# Patient Record
Sex: FEMALE | Race: WHITE | Employment: UNEMPLOYED | URBAN - METROPOLITAN AREA
[De-identification: names, ages, dates, MRNs, and addresses within clinical notes are randomized per-mention and may not be internally consistent; named-entity substitution may affect disease eponyms.]

---

## 2017-03-23 ENCOUNTER — APPOINTMENT (OUTPATIENT)
Dept: GENERAL RADIOLOGY | Age: 62
End: 2017-03-23
Attending: EMERGENCY MEDICINE
Payer: COMMERCIAL

## 2017-03-23 ENCOUNTER — HOSPITAL ENCOUNTER (EMERGENCY)
Age: 62
Discharge: HOME | End: 2017-03-24
Attending: EMERGENCY MEDICINE
Payer: COMMERCIAL

## 2017-03-23 DIAGNOSIS — R55 NEAR SYNCOPE: Primary | ICD-10-CM

## 2017-03-23 DIAGNOSIS — R06.02 SOB (SHORTNESS OF BREATH): ICD-10-CM

## 2017-03-23 DIAGNOSIS — R42 DIZZINESS: ICD-10-CM

## 2017-03-23 LAB
ALBUMIN SERPL BCP-MCNC: 3.9 G/DL (ref 3.5–5)
ALBUMIN/GLOB SERPL: 1.1 {RATIO} (ref 1.1–2.2)
ALP SERPL-CCNC: 104 U/L (ref 45–117)
ALT SERPL-CCNC: 33 U/L (ref 12–78)
AMORPH CRY URNS QL MICRO: ABNORMAL
ANION GAP BLD CALC-SCNC: 10 MMOL/L (ref 5–15)
APPEARANCE UR: ABNORMAL
AST SERPL W P-5'-P-CCNC: 22 U/L (ref 15–37)
BACTERIA URNS QL MICRO: ABNORMAL /HPF
BASOPHILS # BLD AUTO: 0 K/UL (ref 0–0.1)
BASOPHILS # BLD: 0 % (ref 0–1)
BILIRUB SERPL-MCNC: 0.2 MG/DL (ref 0.2–1)
BILIRUB UR QL: NEGATIVE
BUN SERPL-MCNC: 18 MG/DL (ref 6–20)
BUN/CREAT SERPL: 33 (ref 12–20)
CALCIUM SERPL-MCNC: 9.9 MG/DL (ref 8.5–10.1)
CHLORIDE SERPL-SCNC: 96 MMOL/L (ref 97–108)
CK SERPL-CCNC: 69 U/L (ref 26–192)
CO2 SERPL-SCNC: 27 MMOL/L (ref 21–32)
COLOR UR: ABNORMAL
CREAT SERPL-MCNC: 0.54 MG/DL (ref 0.55–1.02)
D DIMER PPP FEU-MCNC: 0.21 MG/L FEU (ref 0–0.65)
EOSINOPHIL # BLD: 0.1 K/UL (ref 0–0.4)
EOSINOPHIL NFR BLD: 2 % (ref 0–7)
EPITH CASTS URNS QL MICRO: ABNORMAL /LPF
ERYTHROCYTE [DISTWIDTH] IN BLOOD BY AUTOMATED COUNT: 12.7 % (ref 11.5–14.5)
GLOBULIN SER CALC-MCNC: 3.5 G/DL (ref 2–4)
GLUCOSE SERPL-MCNC: 111 MG/DL (ref 65–100)
GLUCOSE UR STRIP.AUTO-MCNC: NEGATIVE MG/DL
HCT VFR BLD AUTO: 38 % (ref 35–47)
HGB BLD-MCNC: 13.1 G/DL (ref 11.5–16)
HGB UR QL STRIP: NEGATIVE
KETONES UR QL STRIP.AUTO: NEGATIVE MG/DL
LEUKOCYTE ESTERASE UR QL STRIP.AUTO: ABNORMAL
LYMPHOCYTES # BLD AUTO: 26 % (ref 12–49)
LYMPHOCYTES # BLD: 1.7 K/UL (ref 0.8–3.5)
MCH RBC QN AUTO: 30.7 PG (ref 26–34)
MCHC RBC AUTO-ENTMCNC: 34.5 G/DL (ref 30–36.5)
MCV RBC AUTO: 89 FL (ref 80–99)
MONOCYTES # BLD: 0.8 K/UL (ref 0–1)
MONOCYTES NFR BLD AUTO: 13 % (ref 5–13)
NEUTS SEG # BLD: 3.8 K/UL (ref 1.8–8)
NEUTS SEG NFR BLD AUTO: 59 % (ref 32–75)
NITRITE UR QL STRIP.AUTO: NEGATIVE
PH UR STRIP: 7.5 [PH] (ref 5–8)
PLATELET # BLD AUTO: 275 K/UL (ref 150–400)
POTASSIUM SERPL-SCNC: 3.4 MMOL/L (ref 3.5–5.1)
PROT SERPL-MCNC: 7.4 G/DL (ref 6.4–8.2)
PROT UR STRIP-MCNC: NEGATIVE MG/DL
RBC # BLD AUTO: 4.27 M/UL (ref 3.8–5.2)
RBC #/AREA URNS HPF: ABNORMAL /HPF (ref 0–5)
SODIUM SERPL-SCNC: 133 MMOL/L (ref 136–145)
SP GR UR REFRACTOMETRY: 1.01 (ref 1–1.03)
TROPONIN I SERPL-MCNC: <0.04 NG/ML
UA: UC IF INDICATED,UAUC: ABNORMAL
UROBILINOGEN UR QL STRIP.AUTO: 0.2 EU/DL (ref 0.2–1)
WBC # BLD AUTO: 6.3 K/UL (ref 3.6–11)
WBC URNS QL MICRO: ABNORMAL /HPF (ref 0–4)

## 2017-03-23 PROCEDURE — 80053 COMPREHEN METABOLIC PANEL: CPT | Performed by: EMERGENCY MEDICINE

## 2017-03-23 PROCEDURE — 84484 ASSAY OF TROPONIN QUANT: CPT | Performed by: EMERGENCY MEDICINE

## 2017-03-23 PROCEDURE — 99285 EMERGENCY DEPT VISIT HI MDM: CPT

## 2017-03-23 PROCEDURE — 87077 CULTURE AEROBIC IDENTIFY: CPT

## 2017-03-23 PROCEDURE — 82550 ASSAY OF CK (CPK): CPT | Performed by: EMERGENCY MEDICINE

## 2017-03-23 PROCEDURE — 96361 HYDRATE IV INFUSION ADD-ON: CPT

## 2017-03-23 PROCEDURE — 74011250636 HC RX REV CODE- 250/636: Performed by: EMERGENCY MEDICINE

## 2017-03-23 PROCEDURE — 81001 URINALYSIS AUTO W/SCOPE: CPT

## 2017-03-23 PROCEDURE — 87086 URINE CULTURE/COLONY COUNT: CPT

## 2017-03-23 PROCEDURE — 96360 HYDRATION IV INFUSION INIT: CPT

## 2017-03-23 PROCEDURE — 36415 COLL VENOUS BLD VENIPUNCTURE: CPT | Performed by: EMERGENCY MEDICINE

## 2017-03-23 PROCEDURE — 85379 FIBRIN DEGRADATION QUANT: CPT

## 2017-03-23 PROCEDURE — 93005 ELECTROCARDIOGRAM TRACING: CPT

## 2017-03-23 PROCEDURE — 85025 COMPLETE CBC W/AUTO DIFF WBC: CPT | Performed by: EMERGENCY MEDICINE

## 2017-03-23 PROCEDURE — 87186 SC STD MICRODIL/AGAR DIL: CPT

## 2017-03-23 RX ORDER — VALSARTAN AND HYDROCHLOROTHIAZIDE 320; 12.5 MG/1; MG/1
1 TABLET, FILM COATED ORAL DAILY
COMMUNITY
End: 2018-07-23 | Stop reason: ALTCHOICE

## 2017-03-23 RX ADMIN — SODIUM CHLORIDE 1000 ML: 900 INJECTION, SOLUTION INTRAVENOUS at 23:16

## 2017-03-24 ENCOUNTER — APPOINTMENT (OUTPATIENT)
Dept: GENERAL RADIOLOGY | Age: 62
End: 2017-03-24
Attending: EMERGENCY MEDICINE
Payer: COMMERCIAL

## 2017-03-24 VITALS
WEIGHT: 165 LBS | SYSTOLIC BLOOD PRESSURE: 147 MMHG | BODY MASS INDEX: 24.44 KG/M2 | HEIGHT: 69 IN | OXYGEN SATURATION: 96 % | RESPIRATION RATE: 13 BRPM | HEART RATE: 68 BPM | TEMPERATURE: 97.8 F | DIASTOLIC BLOOD PRESSURE: 71 MMHG

## 2017-03-24 LAB
ATRIAL RATE: 70 BPM
CALCULATED P AXIS, ECG09: 71 DEGREES
CALCULATED R AXIS, ECG10: 40 DEGREES
CALCULATED T AXIS, ECG11: 50 DEGREES
DIAGNOSIS, 93000: NORMAL
P-R INTERVAL, ECG05: 186 MS
Q-T INTERVAL, ECG07: 392 MS
QRS DURATION, ECG06: 106 MS
QTC CALCULATION (BEZET), ECG08: 423 MS
TROPONIN I SERPL-MCNC: <0.04 NG/ML
VENTRICULAR RATE, ECG03: 70 BPM

## 2017-03-24 PROCEDURE — 84484 ASSAY OF TROPONIN QUANT: CPT | Performed by: EMERGENCY MEDICINE

## 2017-03-24 PROCEDURE — 71010 XR CHEST PORT: CPT

## 2017-03-24 NOTE — ED NOTES
Pt and family updated on plan of care. Call bell in reach. No complaints at this time. VSS. NS fluids infusing in PIV. Troponin redraw sent to lab. Pt watching tv in position of comfort.

## 2017-03-24 NOTE — DISCHARGE INSTRUCTIONS
Dizziness: Care Instructions  Your Care Instructions  Dizziness is the feeling of unsteadiness or fuzziness in your head. It is different than having vertigo, which is a feeling that the room is spinning or that you are moving or falling. It is also different from lightheadedness, which is the feeling that you are about to faint. It can be hard to know what causes dizziness. Some people feel dizzy when they have migraine headaches. Sometimes bouts of flu can make you feel dizzy. Some medical conditions, such as heart problems or high blood pressure, can make you feel dizzy. Many medicines can cause dizziness, including medicines for high blood pressure, pain, or anxiety. If a medicine causes your symptoms, your doctor may recommend that you stop or change the medicine. If it is a problem with your heart, you may need medicine to help your heart work better. If there is no clear reason for your symptoms, your doctor may suggest watching and waiting for a while to see if the dizziness goes away on its own. Follow-up care is a key part of your treatment and safety. Be sure to make and go to all appointments, and call your doctor if you are having problems. It's also a good idea to know your test results and keep a list of the medicines you take. How can you care for yourself at home? · If your doctor recommends or prescribes medicine, take it exactly as directed. Call your doctor if you think you are having a problem with your medicine. · Do not drive while you feel dizzy. · Try to prevent falls. Steps you can take include:  ¨ Using nonskid mats, adding grab bars near the tub, and using night-lights. ¨ Clearing your home so that walkways are free of anything you might trip on. ¨ Letting family and friends know that you have been feeling dizzy. This will help them know how to help you. When should you call for help? Call 911 anytime you think you may need emergency care.  For example, call if:  · You passed out (lost consciousness). · You have dizziness along with symptoms of a heart attack. These may include:  ¨ Chest pain or pressure, or a strange feeling in the chest.  ¨ Sweating. ¨ Shortness of breath. ¨ Nausea or vomiting. ¨ Pain, pressure, or a strange feeling in the back, neck, jaw, or upper belly or in one or both shoulders or arms. ¨ Lightheadedness or sudden weakness. ¨ A fast or irregular heartbeat. · You have symptoms of a stroke. These may include:  ¨ Sudden numbness, tingling, weakness, or loss of movement in your face, arm, or leg, especially on only one side of your body. ¨ Sudden vision changes. ¨ Sudden trouble speaking. ¨ Sudden confusion or trouble understanding simple statements. ¨ Sudden problems with walking or balance. ¨ A sudden, severe headache that is different from past headaches. Call your doctor now or seek immediate medical care if:  · You feel dizzy and have a fever, headache, or ringing in your ears. · You have new or increased nausea and vomiting. · Your dizziness does not go away or comes back. Watch closely for changes in your health, and be sure to contact your doctor if:  · You do not get better as expected. Where can you learn more? Go to http://kelechi-barry.info/. Enter A781 in the search box to learn more about \"Dizziness: Care Instructions. \"  Current as of: May 27, 2016  Content Version: 11.1  © 3232-2469 [employer]. Care instructions adapted under license by [employer] (which disclaims liability or warranty for this information). If you have questions about a medical condition or this instruction, always ask your healthcare professional. [place] [address] any warranty or liability for your use of this information.        Vasovagal Syncope: Care Instructions  Your Care Instructions    Vasovagal syncope (say \"iaj-hhn-EYO-gul HJCI-xsb-[id number]\") is sudden dizziness or fainting that can be set off by things such as pain, stress, fear, or trauma. You may sweat or feel lightheaded, sick to your stomach, or tingly. The problem causes the heart rate to slow and the blood vessels to widen, or dilate, for a short time. When this happens, blood pools in the lower body, and less blood goes to the brain. You can usually get relief by lying down with your legs raised (elevated). This helps more blood to flow to your brain and may help relieve symptoms like feeling dizzy. Some doctors may recommend a technique that involves tensing your fists and arms. This type of fainting is often easy to predict. For example, it happens to some people when they see blood or have to get a shot. They may feel symptoms before they faint. An episode of vasovagal syncope usually responds well to self-care. Other treatment often isn't needed. But if the fainting keeps happening, your doctor may suggest further treatments. Follow-up care is a key part of your treatment and safety. Be sure to make and go to all appointments, and call your doctor if you are having problems. It's also a good idea to know your test results and keep a list of the medicines you take. How can you care for yourself at home? · Drink plenty of fluids to prevent dehydration. If you have kidney, heart, or liver disease and have to limit fluids, talk with your doctor before you increase your fluid intake. · Try to avoid things that you think may set off vasovagal syncope. · Talk to your doctor about any medicines you take. Some medicines may increase the chance of this condition occurring. · If you feel symptoms, lie down with your legs raised. Talk to your doctor about what to do if your symptoms come back. When should you call for help? Call 911 anytime you think you may need emergency care. For example, call if:  · You have symptoms of a heart problem. These may include:  ¨ Chest pain or pressure. ¨ Severe trouble breathing.   ¨ A fast or irregular heartbeat. Watch closely for changes in your health, and be sure to contact your doctor if:  · You have more episodes of fainting at home. · You do not get better as expected. Where can you learn more? Go to http://kelechi-barry.info/. Enter L754 in the search box to learn more about \"Vasovagal Syncope: Care Instructions. \"  Current as of: May 27, 2016  Content Version: 11.1  © [id number] [employer]. Care instructions adapted under license by [employer] (which disclaims liability or warranty for this information). If you have questions about a medical condition or this instruction, always ask your healthcare professional. [address] any warranty or liability for your use of this information.

## 2017-03-24 NOTE — ED NOTES
Bedside and Verbal shift change report given to SAÚL/Anabela Perla 1103 (oncoming nurse) by Martha Bernal (offgoing nurse). Report included the following information SBAR, ED Summary, MAR and Recent Results. Assuming care of pt. Pt resting on stretcher in position of comfort. Call bell in reach. A/Ox4. Pt has no complaints at this time. NS fluid bolus infusing. SRx1. Monitor x3.

## 2017-03-24 NOTE — ED NOTES
Dr. Irlanda Alvarado reviewed discharge instructions with the patient. The patient verbalized understanding. All questions and concerns were addressed. The patient declined a wheelchair and is discharged ambulatory in the care of family members with instructions and prescriptions in hand. Pt is alert and oriented x 4. Respirations are clear and unlabored.

## 2017-03-24 NOTE — ED PROVIDER NOTES
HPI Comments: 61 yo F w RA presents with near syncopal episode 2 hours ago. Prodrome of nausea diaphoresis and pallor, feelings of total body weakness, then lightheadedness and patient fell to the ground without LOC and was too weak to stand. Then developed nonexertional chest \"tightness\" and dyspnea. Prior to this, patient had tested positive for UTI, recent Polyuria. Exercised earlier today without limitation. Had negative cath in  (normal coronaries), neg stress 2 years ago. Denies prior blood clots. Denies fevers, rhythmic shaking, postictal.    The history is provided by the patient. Past Medical History:   Diagnosis Date    Abdominal pain     Chronic rheumatic arthritis (HCC)     Hypertension     Melanoma of thoracic region [facility])        Past Surgical History:   Procedure Laterality Date    HX GYN       X4    HX OTHER SURGICAL      lymph node removed from Right arm benign         Family History:   Problem Relation Age of Onset    Cancer Mother     Heart Disease Father        Social History     Social History    Marital status:      Spouse name: N/A    Number of children: N/A    Years of education: N/A     Occupational History    Not on file. Social History Main Topics    Smoking status: Former Smoker     Packs/day: 1.00     Years: 1.00     Types: Cigarettes, Cigars    Smokeless tobacco: Not on file      Comment: quit     Alcohol use No    Drug use: Not on file    Sexual activity: Not on file     Other Topics Concern    Not on file     Social History Narrative         ALLERGIES: Levaquin [levofloxacin]; Pcn [penicillins]; and Seafood [shellfish containing products]    Review of Systems   Constitutional: Positive for diaphoresis. Negative for fever. Eyes: Negative for visual disturbance. Respiratory: Positive for chest tightness and shortness of breath. Endocrine: Positive for polyuria. Genitourinary: Negative for dysuria.    Skin: Positive for pallor. Negative for wound. Neurological: Positive for weakness (generalized) and light-headedness. Negative for tremors and seizures. + near-syncope. Denies LOC   Psychiatric/Behavioral: Negative for suicidal ideas. All other systems reviewed and are negative. Patient Vitals for the past 12 hrs:   Temp Pulse Resp BP SpO2   03/24/17 0216 - 66 12 - 95 %   03/24/17 0147 - 66 11 - 97 %   03/24/17 0112 - 68 15 - 99 %   03/24/17 0100 - 68 11 147/71 96 %   03/24/17 0043 - 70 16 - 99 %   03/24/17 0033 - 70 13 155/64 97 %   03/24/17 0008 - 68 15 - 99 %   03/23/17 2330 - 71 16 152/70 97 %   03/23/17 2315 - 67 14 160/81 97 %   03/23/17 2217 97.8 °F (36.6 °C) 68 12 169/71 99 %              Physical Exam   Constitutional: She is oriented to person, place, and time. She appears well-developed and well-nourished. HENT:   Head: Normocephalic and atraumatic. Cardiovascular: Normal rate, regular rhythm, normal heart sounds and intact distal pulses. Exam reveals no gallop. No murmur heard. Pulmonary/Chest: Effort normal and breath sounds normal. No respiratory distress. She has no wheezes. She has no rales. Abdominal: Soft. She exhibits no distension. There is no tenderness. Musculoskeletal: She exhibits no edema. Neurological: She is alert and oriented to person, place, and time. Skin: Skin is warm and dry. Psychiatric: She has a normal mood and affect. Nursing note and vitals reviewed. MDM  Number of Diagnoses or Management Options  Diagnosis management comments: 63 yo F with prior clean cath, with near syncopal episode. Currently normal vitals, HD stable. Concern is for vasovagal syncope vs dehydration vs PE vs ACS.       Will eval with labs, cheest xray and OS VS       Amount and/or Complexity of Data Reviewed  Clinical lab tests: ordered and reviewed  Tests in the radiology section of CPT®: ordered and reviewed  Tests in the medicine section of CPT®: ordered and reviewed  Review and summarize past medical records: yes  Independent visualization of images, tracings, or specimens: yes    Patient Progress  Patient progress: stable    ED Course       Procedures     PROGRESS NOTE:  2:53 AM  Pt remained asymptomatic throughout rest of ED visit  Written by Reva Christopher ED Scribe, as dictated by Karo Richards MD.      LABORATORY TESTS:  Recent Results (from the past 12 hour(s))   EKG, 12 LEAD, INITIAL    Collection Time: 03/23/17  9:31 PM   Result Value Ref Range    Ventricular Rate 70 BPM    Atrial Rate 70 BPM    P-R Interval 186 ms    QRS Duration 106 ms    Q-T Interval 392 ms    QTC Calculation (Bezet) 423 ms    Calculated P Axis 71 degrees    Calculated R Axis 40 degrees    Calculated T Axis 50 degrees    Diagnosis       Normal sinus rhythm  Possible Left atrial enlargement  When compared with ECG of 30-JUN-2012 20:09,  No significant change was found     CBC WITH AUTOMATED DIFF    Collection Time: 03/23/17  9:42 PM   Result Value Ref Range    WBC 6.3 3.6 - 11.0 K/uL    RBC 4.27 3.80 - 5.20 M/uL    HGB 13.1 11.5 - 16.0 g/dL    HCT 38.0 35.0 - 47.0 %    MCV 89.0 80.0 - 99.0 FL    MCH 30.7 26.0 - 34.0 PG    MCHC 34.5 30.0 - 36.5 g/dL    RDW 12.7 11.5 - 14.5 %    PLATELET 426 351 - 087 K/uL    NEUTROPHILS 59 32 - 75 %    LYMPHOCYTES 26 12 - 49 %    MONOCYTES 13 5 - 13 %    EOSINOPHILS 2 0 - 7 %    BASOPHILS 0 0 - 1 %    ABS. NEUTROPHILS 3.8 1.8 - 8.0 K/UL    ABS. LYMPHOCYTES 1.7 0.8 - 3.5 K/UL    ABS. MONOCYTES 0.8 0.0 - 1.0 K/UL    ABS. EOSINOPHILS 0.1 0.0 - 0.4 K/UL    ABS.  BASOPHILS 0.0 0.0 - 0.1 K/UL   METABOLIC PANEL, COMPREHENSIVE    Collection Time: 03/23/17  9:42 PM   Result Value Ref Range    Sodium 133 (L) 136 - 145 mmol/L    Potassium 3.4 (L) 3.5 - 5.1 mmol/L    Chloride 96 (L) 97 - 108 mmol/L    CO2 27 21 - 32 mmol/L    Anion gap 10 5 - 15 mmol/L    Glucose 111 (H) 65 - 100 mg/dL    BUN 18 6 - 20 MG/DL    Creatinine 0.54 (L) 0.55 - 1.02 MG/DL    BUN/Creatinine ratio 33 (H) 12 - 20 GFR est AA >60 >60 ml/min/1.73m2    GFR est non-AA >60 >60 ml/min/1.73m2    Calcium 9.9 8.5 - 10.1 MG/DL    Bilirubin, total 0.2 0.2 - 1.0 MG/DL    ALT (SGPT) 33 12 - 78 U/L    AST (SGOT) 22 15 - 37 U/L    Alk. phosphatase 104 45 - 117 U/L    Protein, total 7.4 6.4 - 8.2 g/dL    Albumin 3.9 3.5 - 5.0 g/dL    Globulin 3.5 2.0 - 4.0 g/dL    A-G Ratio 1.1 1.1 - 2.2     CK W/ REFLX CKMB    Collection Time: 03/23/17  9:42 PM   Result Value Ref Range    CK 69 26 - 192 U/L   TROPONIN I    Collection Time: 03/23/17  9:42 PM   Result Value Ref Range    Troponin-I, Qt. <0.04 <0.05 ng/mL   D DIMER    Collection Time: 03/23/17 10:30 PM   Result Value Ref Range    D-dimer 0.21 0.00 - 0.65 mg/L FEU   URINALYSIS W/ REFLEX CULTURE    Collection Time: 03/23/17 10:53 PM   Result Value Ref Range    Color YELLOW/STRAW      Appearance TURBID (A) CLEAR      Specific gravity 1.012 1.003 - 1.030      pH (UA) 7.5 5.0 - 8.0      Protein NEGATIVE  NEG mg/dL    Glucose NEGATIVE  NEG mg/dL    Ketone NEGATIVE  NEG mg/dL    Bilirubin NEGATIVE  NEG      Blood NEGATIVE  NEG      Urobilinogen 0.2 0.2 - 1.0 EU/dL    Nitrites NEGATIVE  NEG      Leukocyte Esterase MODERATE (A) NEG      WBC 5-10 0 - 4 /hpf    RBC 0-5 0 - 5 /hpf    Epithelial cells FEW FEW /lpf    Bacteria 1+ (A) NEG /hpf    UA:UC IF INDICATED URINE CULTURE ORDERED (A) CNI      Amorphous Crystals 2+ (A) NEG   TROPONIN I    Collection Time: 03/24/17  1:09 AM   Result Value Ref Range    Troponin-I, Qt. <0.04 <0.05 ng/mL       IMAGING RESULTS:  CXR Results  (Last 48 hours)               03/24/17 0004  XR CHEST PORT Final result    Impression:  IMPRESSION: No Acute Disease. Narrative:  EXAM: Portable CXR.  2359 hours         INDICATION: Chest tightness, shortness of breath, dizziness, sweating and nausea   and feeling faint tonight. The lungs are clear. Heart is normal in size. There is no overt pulmonary edema.    There is no evident pneumothorax, apparent adenopathy or sizable pleural   effusion. MEDICATIONS GIVEN:  Medications   sodium chloride 0.9 % bolus infusion 1,000 mL (1,000 mL IntraVENous New Bag 3/23/17 8282)       IMPRESSION:  1. Near syncope    2. Dizziness    3. SOB (shortness of breath)        PLAN:  1. Discharge home  Follow-up Information     Follow up With Details 5[address] Frank Torres MD Today  [facility] [address] [facility] [address] [facility]  [phone]      Guero Morse MD Schedule an appointment as soon as possible for a visit in 3 days  [address]  [address]  [address] [address] [phone]      [facility] EMERGENCY DEPT  As needed, If symptoms worsen/recur [address]  [address] [facility]  [phone]      Return to ED if worse     DISCHARGE NOTE  2:15 AM  The patient has been re-evaluated and is ready for discharge. Reviewed available results with patient. Counseled pt on diagnosis and care plan. Pt has expressed understanding, and all questions have been answered. Pt agrees with plan and agrees to F/U as recommended, or return to the ED if their sxs worsen. Discharge instructions have been provided and explained to the pt, along with reasons to return to the ED. This note is prepared by Wilfred Benites, acting as Scribe for Steve Degroot MD.    Steve Degroot MD: The scribe's documentation has been prepared under my direction and personally reviewed by me in its entirety. I confirm that the note above accurately reflects all work, treatment, procedures, and medical decision making performed by me. I personally saw and examined the patient. I have reviewed and agree with the residents findings, including all diagnostic interpretations, and plans as written. I was present during the key portions of separately billed procedures.   Steve Degroot MD

## 2017-03-24 NOTE — ED NOTES
Pt arrives via EMS. Pt c/o chest tightness, shortness of breath, dizziness, sweating and nausea after going to restroom. Pt diagnosed with UTI at PCP ( Dr. Sy Adames) today. Pt has not taken medicine for UTI yet today. Pt has hx of HTN and denies cardiac hx. Monitor x 3. Call bell within reach and plan of care discussed.

## 2017-03-25 NOTE — PROGRESS NOTES
No abx at discharge. ED note states she was recently dx with UTI from outside facility but does not state when or what abx she was prescribed. Once sensitivity results needs to be informed and discuss what abx she was or is currently taking. Allergic to PCN and Levaquin.   Jannell Severe, PA-C

## 2017-03-26 LAB
BACTERIA SPEC CULT: ABNORMAL
BACTERIA SPEC CULT: ABNORMAL
CC UR VC: ABNORMAL
SERVICE CMNT-IMP: ABNORMAL

## 2018-07-23 ENCOUNTER — OFFICE VISIT (OUTPATIENT)
Dept: INTERNAL MEDICINE CLINIC | Age: 63
End: 2018-07-23

## 2018-07-23 VITALS
OXYGEN SATURATION: 99 % | HEART RATE: 83 BPM | HEIGHT: 69 IN | BODY MASS INDEX: 25.59 KG/M2 | SYSTOLIC BLOOD PRESSURE: 130 MMHG | WEIGHT: 172.8 LBS | DIASTOLIC BLOOD PRESSURE: 82 MMHG

## 2018-07-23 DIAGNOSIS — C43.59 MELANOMA OF THORACIC REGION (HCC): ICD-10-CM

## 2018-07-23 DIAGNOSIS — M06.9 RHEUMATOID ARTHRITIS INVOLVING MULTIPLE SITES, UNSPECIFIED RHEUMATOID FACTOR PRESENCE: ICD-10-CM

## 2018-07-23 DIAGNOSIS — I10 ESSENTIAL HYPERTENSION: ICD-10-CM

## 2018-07-23 DIAGNOSIS — Z00.00 ROUTINE PHYSICAL EXAMINATION: Primary | ICD-10-CM

## 2018-07-23 LAB
BACTERIA UA POCT, BACTPOCT: NORMAL
BILIRUB UR QL STRIP: NEGATIVE
CASTS UA POCT: 0
CLUE CELLS, CLUEPOCT: NEGATIVE
CRYSTALS UA POCT, CRYSPOCT: NEGATIVE
EPITHELIAL CELLS POCT: NEGATIVE
GLUCOSE UR-MCNC: NEGATIVE MG/DL
GRAN# POC: 3.6 K/UL (ref 2–7.8)
GRAN% POC: 65.1 % (ref 37–92)
HCT VFR BLD CALC: 39.6 % (ref 37–51)
HGB BLD-MCNC: 13.2 G/DL (ref 12–18)
KETONES P FAST UR STRIP-MCNC: NEGATIVE MG/DL
LY# POC: 1.6 K/UL (ref 0.6–4.1)
LY% POC: 29.1 % (ref 10–58.5)
MCH RBC QN: 30.2 PG (ref 26–32)
MCHC RBC-ENTMCNC: 33.3 G/DL (ref 30–36)
MCV RBC: 91 FL (ref 80–97)
MID #, POC: 0.3 K/UL (ref 0–1.8)
MID% POC: 5.8 % (ref 0.1–24)
MUCUS UA POCT, MUCPOCT: NORMAL
PH UR STRIP: 7 [PH] (ref 5–7)
PLATELET # BLD: 346 K/UL (ref 140–440)
PROT UR QL STRIP: NEGATIVE
RBC # BLD: 4.36 M/UL (ref 4.2–6.3)
RBC UA POCT, RBCPOCT: 0
SP GR UR STRIP: 1.01 (ref 1.01–1.02)
TRICH UA POCT, TRICHPOC: NEGATIVE
UA UROBILINOGEN AMB POC: NORMAL (ref 0.2–1)
URINALYSIS CLARITY POC: CLEAR
URINALYSIS COLOR POC: NORMAL
URINE BLOOD POC: NORMAL
URINE CULT COMMENT, POCT: NORMAL
URINE LEUKOCYTES POC: NEGATIVE
URINE NITRITES POC: NEGATIVE
WBC # BLD: 5.5 K/UL (ref 4.1–10.9)
WBC UA POCT, WBCPOCT: 0
YEAST UA POCT, YEASTPOC: NEGATIVE

## 2018-07-23 RX ORDER — AMLODIPINE BESYLATE 2.5 MG/1
2.5 TABLET ORAL DAILY
Qty: 90 TAB | Refills: 3 | Status: SHIPPED | OUTPATIENT
Start: 2018-07-23 | End: 2019-05-06 | Stop reason: SDUPTHER

## 2018-07-23 RX ORDER — VALSARTAN 320 MG/1
320 TABLET ORAL DAILY
Qty: 90 TAB | Refills: 0 | Status: SHIPPED | OUTPATIENT
Start: 2018-07-23 | End: 2018-09-25 | Stop reason: SDUPTHER

## 2018-07-23 RX ORDER — NABUMETONE 750 MG/1
750 TABLET, FILM COATED ORAL 2 TIMES DAILY
COMMUNITY
End: 2018-07-23 | Stop reason: ALTCHOICE

## 2018-07-23 RX ORDER — VALSARTAN 320 MG/1
TABLET ORAL DAILY
COMMUNITY
End: 2018-07-23 | Stop reason: SDUPTHER

## 2018-07-23 RX ORDER — AMLODIPINE BESYLATE 2.5 MG/1
TABLET ORAL DAILY
COMMUNITY
End: 2018-07-23 | Stop reason: SDUPTHER

## 2018-07-23 NOTE — PROGRESS NOTES
Miriam Gibbons is a 59 y.o. female presenting for Complete Physical  .     1. Have you been to the ER, urgent care clinic since your last visit? Hospitalized since your last visit? No    2. Have you seen or consulted any other health care providers outside of the [address] [place] [address] since your last visit? Include any pap smears or colon screening. Yes When: June 2018 Where: Dr Malachi Thomas for visit: RA follow up. Saw Dermatologist this morning for skin check. No flowsheet data found. No flowsheet data found. PHQ over the last two weeks 7/23/2018   Little interest or pleasure in doing things Not at all   Feeling down, depressed, irritable, or hopeless Not at all   Total Score PHQ 2 0       There are no discontinued medications.

## 2018-07-23 NOTE — PATIENT INSTRUCTIONS

## 2018-07-23 NOTE — MR AVS SNAPSHOT
[address] [facility] [address] [address] [phone] Patient: [place] MRN: [id number] DQS:[id number] Visit Information Date & Time Provider Department Dept. Phone Encounter #  
 7/23/2018  2:00 PM Anna Burkett, [address] [facility] [phone] [id number] Follow-up Instructions Return in about 1 year (around 7/23/2019). Follow-up and Disposition History Upcoming Health Maintenance Date Due DTaP/Tdap/Td series (1 - Tdap) 7/11/1977 PAP AKA CERVICAL CYTOLOGY 7/11/1977 FOBT Q 1 YEAR AGE 50-75 7/11/2006 Pneumococcal 19-64 Highest Risk (2 of 3 - PCV13) 1/1/2012 ZOSTER VACCINE AGE 60> 5/11/2016 BREAST CANCER SCRN MAMMOGRAM 5/5/2017 MEDICARE YEARLY EXAM 7/23/2018 Influenza Age 5 to Adult 8/1/2018 Allergies as of 7/23/2018  Review Complete On: 7/23/2018 By: Anna Burkett MD  
  
 Severity Noted Reaction Type Reactions Levaquin [Levofloxacin] Medium 06/18/2012    Rash Pcn [Penicillins]  06/18/2012    Rash Seafood [Shellfish Containing Products]  06/30/2012    Shortness of Breath Current Immunizations  Reviewed on 6/26/2012 Name Date Pneumococcal Polysaccharide (PPSV-23) 1/1/2011 Not reviewed this visit You Were Diagnosed With   
  
 Codes Comments Routine physical examination    -  Primary ICD-10-CM: Z00.00 ICD-9-CM: V70.0 Rheumatoid arthritis involving multiple sites, unspecified rheumatoid factor presence ([facility]     ICD-10-CM: M06.9 ICD-9-CM: 714.0 Essential hypertension     ICD-10-CM: I10 
ICD-9-CM: 401.9 Melanoma of thoracic region [facility])     ICD-10-CM: C43.59 
ICD-9-CM: 172.5 Vitals BP Pulse Height(growth percentile) Weight(growth percentile) SpO2 BMI  
 130/82 (BP 1 Location: Right arm, BP Patient Position: Sitting) 83 5' 9\" (1.753 m) 172 lb 12.8 oz (78.4 kg) 99% 25.52 kg/m2 OB Status Smoking Status Postmenopausal Former Smoker BMI and BSA Data Body Mass Index Body Surface Area 25.52 kg/m 2 1.95 m 2 Preferred Pharmacy Pharmacy Name Phone [facility], [place] - [address] [phone] Your Updated Medication List  
  
   
This list is accurate as of 7/23/18  2:43 PM.  Always use your most recent med list.  
  
  
  
  
 acetaminophen 500 mg tablet Commonly known as:  TYLENOL Take 1,000 mg by mouth every six (6) hours as needed. amLODIPine 2.5 mg tablet Commonly known as:  Love Breach Take 1 Tab by mouth daily. etanercept 50 mg/mL (0.98 mL) injection Commonly known as:  ENBREL 50 mg by SubCUTAneous route every seven (7) days. Indications: RHEUMATOID ARTHRITIS  
  
 hydroxychloroquine 200 mg tablet Commonly known as:  PLAQUENIL Take 200 mg by mouth two (2) times a day. Take one tablet twice daily  
  
 sulfaSALAzine 500 mg tablet Commonly known as:  AZULFIDINE Take 500 mg by mouth two (2) times a day. Take two tablets twice daily   Indications: RHEUMATOID ARTHRITIS  
  
 valsartan 320 mg tablet Commonly known as:  DIOVAN Take 1 Tab by mouth daily. Prescriptions Sent to Pharmacy Refills  
 valsartan (DIOVAN) 320 mg tablet 0 Sig: Take 1 Tab by mouth daily. Class: Normal  
 Pharmacy: [address] [place] [address] Ph #: [phone] Route: Oral  
 amLODIPine (NORVASC) 2.5 mg tablet 3 Sig: Take 1 Tab by mouth daily. Class: Normal  
 Pharmacy: [address] [place] [address] Ph #: [phone] Route: Oral  
  
We Performed the Following AMB POC COMPLETE CBC,AUTOMATED ENTER [id number] CPT(R)] AMB POC URINALYSIS DIP STICK AUTO W/ MICRO  [25940 CPT(R)] COLLECTION VENOUS BLOOD,VENIPUNCTURE [id number] CPT(R)] LIPID PANEL [28251 CPT(R)] METABOLIC PANEL, COMPREHENSIVE [95431 CPT(R)] TSH 3RD GENERATION [79311 CPT(R)] Follow-up Instructions Return in about 1 year (around 7/23/2019). Patient Instructions Learning About Cutting Calories How do calories affect your weight? Food gives your body energy. Energy from the food you eat is measured in calories. This energy keeps your heart beating, your brain active, and your muscles working. Your body needs a certain number of calories each day. After your body uses the calories it needs, it stores extra calories as fat. To lose weight safely, you have to eat fewer calories while eating in a healthy way. How many calories do you need each day? The more active you are, the more calories you need. When you are less active, you need fewer calories. How many calories you need each day also depends on several things, including your age and whether you are male or female. Here are some general guidelines for adults: 
· Less active women and older adults need 1,600 to 2,000 calories each day. · Active women and less active men need 2,000 to 2,400 calories each day. · Active men need 2,400 to 3,000 calories each day. How can you cut calories and eat healthy meals? Whole grains, vegetables and fruits, and dried beans are good lower-calorie foods. They give you lots of nutrients and fiber. And they fill you up. Sweets, energy drinks, and soda pop are high in calories. They give you few nutrients and no fiber. Try to limit soda pop, fruit juice, and energy drinks. Drink water instead. Some fats can be part of a healthy diet. But cutting back on fats from highly processed foods like fast foods and many snack foods is a good way to lower the calories in your diet. Also, use smaller amounts of fats like butter, margarine, salad dressing, and mayonnaise. Add fresh garlic, lemon, or herbs to your meals to add flavor without adding fat. Meats and dairy products can be a big source of hidden fats. Try to choose lean or low-fat versions of these products. Fat-free cookies, candies, chips, and frozen treats can still be high in sugar and calories. Some fat-free foods have more calories than regular ones. Eat fat-free treats in moderation, as you would other foods. If your favorite foods are high in fat, salt, sugar, or calories, limit how often you eat them. Eat smaller servings, or look for healthy substitutes. Fill up on fruits, vegetables, and whole grains. Eating at home · Use meat as a side dish instead of as the main part of your meal. 
· Try main dishes that use whole wheat pasta, brown rice, dried beans, or vegetables. · Find ways to cook with little or no fat, such as broiling, steaming, or grilling. · Use cooking spray instead of oil. If you use oil, use a monounsaturated oil, such as canola or olive oil. · Trim fat from meats before you cook them. · Drain off fat after you brown the meat or while you roast it. · Chill soups and stews after you cook them. Then skim the fat off the top after it hardens. Eating out · Order foods that are broiled or poached rather than fried or breaded. · Cut back on the amount of butter or margarine that you use on bread. · Order sauces, gravies, and salad dressings on the side, and use only a little. · When you order pasta, choose tomato sauce rather than cream sauce. · Ask for salsa with your baked potato instead of sour cream, butter, cheese, or johnson. · Order meals in a small size instead of upgrading to a large. · Share an entree, or take part of your food home to eat as another meal. 
· Share appetizers and desserts. Where can you learn more? Go to http://kelechi-barry.info/. Enter 99 [id number] in the search box to learn more about \"Learning About Cutting Calories. \" Current as of: May 12, 2017 Content Version: 11.7 © 4109-2979 Healthwise, Incorporated. Care instructions adapted under license by [employer] (which disclaims liability or warranty for this information). If you have questions about a medical condition or this instruction, always ask your healthcare professional. [address] any warranty or liability for your use of this information. Patient Instructions History Introducing [facility]! Dear [place]: 
Thank you for requesting a [employer] account. Our records indicate that you already have an active [employer] account. You can access your account anytime at [email or web address]. [email or web address]/[email or web address] Did you know that you can access your hospital and ER discharge instructions at any time in [employer]? You can also review all of your test results from your hospital stay or ER visit. Additional Information If you have questions, please visit the Frequently Asked Questions section of the [employer] website at [email or web address] Remember, [employer] is NOT to be used for urgent needs. For medical emergencies, dial 911. Now available from your iPhone and Android! Please provide this summary of care documentation to your next provider. Your primary care clinician is listed as GISSELLE Ferreira. If you have any questions after today's visit, please call [phone].

## 2018-07-23 NOTE — PROGRESS NOTES
Subjective: This note will not be viewable in [address] 59 y.o. female for annual Comprehensive personal healthcare examination. Mrs. Angelica Moore is a 67-year-old  female who presents to the office today for complete checkup. Her medical issues have included the following    The patient has hypertension for which he is on amlodipine and valsartan. She tolerates this well and denies any dizziness, lower extremity edema, cough. She has had no headaches, numbness, tingling or focal neurological problems. She is followed by Dr. Johnathon Smith at [facility] for rheumatoid arthritis. She remains on Enbrel, Azulfidine and Plaquenil. She is tolerating this regimen well and is had no evidence of any infection. Her disease has been fairly well controlled. The patient has had a history of melanoma ×2 removed. She is followed very closely by dermatology as a result. History of present illness: This patient has multiple medical problems. These include:   Patient Active Problem List   Diagnosis Code    Rheumatoid arthritis ([facility] M06.9    Abdominal pain R10.9    Elevated liver function tests R79.89    Sepsis(995.91) A41.9    Melanoma of thoracic region ([facility] C43.59    HTN (hypertension) I10    Adverse drug reaction T88. 7XXA          Past Medical History:   Diagnosis Date    Abdominal pain     Chronic rheumatic arthritis ([facility]     Hypertension     Melanoma of thoracic region [facility])      Past Surgical History:   Procedure Laterality Date    HX GYN       X4    HX OTHER SURGICAL      lymph node removed from Right arm benign     Allergies   Allergen Reactions    Levaquin [Levofloxacin] Rash    Pcn [Penicillins] Rash    Seafood [Shellfish Containing Products] Shortness of Breath     Current Outpatient Prescriptions   Medication Sig Dispense Refill    valsartan (DIOVAN) 320 mg tablet Take 1 Tab by mouth daily. 90 Tab 0    amLODIPine (NORVASC) 2.5 mg tablet Take 1 Tab by mouth daily.  90 Tab 3  etanercept (ENBREL) 50 mg/mL (0.98 mL) injection 50 mg by SubCUTAneous route every seven (7) days. Indications: RHEUMATOID ARTHRITIS      sulfaSALAzine (AZULFIDINE) 500 mg tablet Take 500 mg by mouth two (2) times a day. Take two tablets twice daily   Indications: RHEUMATOID ARTHRITIS      hydroxychloroquine (PLAQUINIL) 200 mg tablet Take 200 mg by mouth two (2) times a day. Take one tablet twice daily       acetaminophen (TYLENOL) 500 mg tablet Take 1,000 mg by mouth every six (6) hours as needed. Social History     Social History    Marital status:      Spouse name: N/A    Number of children: N/A    Years of education: N/A     Social History Main Topics    Smoking status: Former Smoker     Packs/day: 1.00     Years: 1.00     Types: Cigarettes, Cigars    Smokeless tobacco: Never Used      Comment: quit 1990    Alcohol use No    Drug use: None    Sexual activity: Not Asked     Other Topics Concern    None     Social History Narrative     Family History   Problem Relation Age of Onset    Cancer Mother     Heart Disease Father        Health Maintenance   Topic Date Due    DTaP/Tdap/Td series (1 - Tdap) 07/11/1977    PAP AKA CERVICAL CYTOLOGY  07/11/1977    FOBT Q 1 YEAR AGE 50-75  07/11/2006    Pneumococcal 19-64 Highest Risk (2 of 3 - PCV13) 01/01/2012    ZOSTER VACCINE AGE 60>  05/11/2016    BREAST CANCER SCRN MAMMOGRAM  05/05/2017    MEDICARE YEARLY EXAM  07/23/2018    Influenza Age 9 to Adult  08/01/2018    Hepatitis C Screening  Completed       Review of Systems  Constitutional:  She denies fever, weight loss, sweats or fatigue. HEENT:  No blurred or double vision, headache or dizziness. No difficulty with swallowing, taste, speech or smell. Respiratory:  No cough, wheezing or shortness of breath. No sputum production. Cardiac:  Denies chest pain, palpitations, unexplained indigestion, syncope, edema, PND or orthopnea.     GI:  No changes in bowel movements, no abdominal pain, no bloating, anorexia, nausea, vomiting or heartburn. :  No frequency or dysuria. Denies incontinence. Extremities:  No joint pain, stiffness or swelling. Skin:  No recent rashes or mole changes. Neurological:  No numbness, tingling, burning paresthesias or loss of motor strength. No syncope, dizziness, frequent headaches or memory loss. ROS otherwise negative      Objective:     Vitals:    07/23/18 1401   BP: 130/82   Pulse: 83   SpO2: 99%   Weight: 172 lb 12.8 oz (78.4 kg)   Height: 5' 9\" (1.753 m)   PainSc:   0 - No pain     Body mass index is 25.52 kg/(m^2). Physical Examination:   General Appearance:  Well-developed, well-nourished, no acute distress. Vision:  Deferred to ophthalmologist.       HEENT:    Ears:  The TMs and ear canals were clear. Eyes:  The pupillary responses were normal.  Extraocular muscle function intact. Lids and conjunctiva not injected. No conjunctiva redness or drainage. Pharynx:  Clear with teeth in good repair. No masses were noted. Neck:  Supple without thyromegaly or adenopathy. No JVD noted. No carotid bruits. Lungs:  Clear to auscultation and percussion. Cardiac:  Regular rate and rhythm without murmur. PMI not displaced. No gallop, rub or click. Abdomen:  Flat, soft, non-tender without palpable organomegaly or mass. No pulsatile mass was felt, and no bruit was heard. Bowel sounds were active. Breasts:  Deferred to GYN  GYN: Deferred to GYN    Rectal exam: Deferred to GYN    Extremities:  No clubbing, cyanosis or edema. Pulses:  Dorsalis pedis and posterior tibial pulses felt without difficulty. Skin:  No rash or unusual mole changes noted. Lymph Nodes:  None felt in the cervical, supraclavicular, axillary or inguinal region. Neurological:  Cranial nerves II-XII grossly intact. Motor strength 5/5. DTRs 2+ and symmetric.   Station and gait normal.  Physical exam otherwise negative        Assessment/Plan:     Diagnoses and all orders for this visit:    Routine physical examination    Rheumatoid arthritis involving multiple sites, unspecified rheumatoid factor presence (HCC)    Essential hypertension  -     AMB POC COMPLETE CBC,AUTOMATED ENTER  -     COLLECTION VENOUS BLOOD,VENIPUNCTURE  -     AMB POC URINALYSIS DIP STICK AUTO W/ MICRO   -     METABOLIC PANEL, COMPREHENSIVE  -     LIPID PANEL  -     TSH 3RD GENERATION    Melanoma of thoracic region ([facility]    Other orders  -     valsartan (DIOVAN) 320 mg tablet; Take 1 Tab by mouth daily. , Normal, Disp-90 Tab, R-0  -     amLODIPine (NORVASC) 2.5 mg tablet; Take 1 Tab by mouth daily. , Normal, Disp-90 Tab, R-3        Other instructions: The patient's medications are reviewed and reconciled. No change in her current medications are made. Her blood pressure is well controlled. Body mass index is 25.52 and dietary counseling along with printed patient education is given    Continued follow-up with Dr. Isaiah Bosworth at [facility] regarding her rheumatoid arthritis    Await results of multiple labs    His issues are reviewed today. Patient is up-to-date on colonoscopy and we will obtain the report from Dr. Natalia Flowers office. The patient is due to have Pap testing and mammography and will be receiving them from [facility] positions. Age-appropriate vaccinations including Tdap, Prevnar 13 and shingles vaccine were discussed with the patient and she will receive these at her pharmacy. Follow-up yearly    Follow-up Disposition:  Return in about 1 year (around 7/23/2019).     Vasquez Mcgill MD

## 2018-07-24 ENCOUNTER — TELEPHONE (OUTPATIENT)
Dept: INTERNAL MEDICINE CLINIC | Age: 63
End: 2018-07-24

## 2018-07-24 LAB
ALBUMIN SERPL-MCNC: 4.6 G/DL (ref 3.6–4.8)
ALBUMIN/GLOB SERPL: 1.5 {RATIO} (ref 1.2–2.2)
ALP SERPL-CCNC: 97 IU/L (ref 39–117)
ALT SERPL-CCNC: 20 IU/L (ref 0–32)
AST SERPL-CCNC: 24 IU/L (ref 0–40)
BILIRUB SERPL-MCNC: 0.2 MG/DL (ref 0–1.2)
BUN SERPL-MCNC: 17 MG/DL (ref 8–27)
BUN/CREAT SERPL: 31 (ref 12–28)
CALCIUM SERPL-MCNC: 10.3 MG/DL (ref 8.7–10.3)
CHLORIDE SERPL-SCNC: 97 MMOL/L (ref 96–106)
CHOLEST SERPL-MCNC: 185 MG/DL (ref 100–199)
CO2 SERPL-SCNC: 25 MMOL/L (ref 20–29)
CREAT SERPL-MCNC: 0.55 MG/DL (ref 0.57–1)
GLOBULIN SER CALC-MCNC: 3.1 G/DL (ref 1.5–4.5)
GLUCOSE SERPL-MCNC: 82 MG/DL (ref 65–99)
HDLC SERPL-MCNC: 70 MG/DL
LDLC SERPL CALC-MCNC: 97 MG/DL (ref 0–99)
POTASSIUM SERPL-SCNC: 4.3 MMOL/L (ref 3.5–5.2)
PROT SERPL-MCNC: 7.7 G/DL (ref 6–8.5)
SODIUM SERPL-SCNC: 134 MMOL/L (ref 134–144)
TRIGL SERPL-MCNC: 88 MG/DL (ref 0–149)
TSH SERPL DL<=0.005 MIU/L-ACNC: 2.05 UIU/ML (ref 0.45–4.5)
VLDLC SERPL CALC-MCNC: 18 MG/DL (ref 5–40)

## 2018-07-24 NOTE — TELEPHONE ENCOUNTER
Patient is calling, she is requesting a call back in regards to her lab results, specifically her urinalysis. She is concerned that there was a presence in blood and would like to know if she should be concerned about this.      Best call back # for patient: [phone]

## 2018-07-24 NOTE — PROGRESS NOTES
Please notify patient. Labs stable.    No change in current management/meds  Send copy of labs to her rheumatologist

## 2018-07-24 NOTE — TELEPHONE ENCOUNTER
The urine dipstick tested positive for blood however on the microscopic examination after the urine was spun down there were no red blood cells seen.   So no need for any further evaluation

## 2018-09-25 RX ORDER — VALSARTAN 320 MG/1
TABLET ORAL
Qty: 90 TAB | Refills: 0 | Status: SHIPPED | OUTPATIENT
Start: 2018-09-25 | End: 2019-01-25 | Stop reason: SDUPTHER

## 2018-10-23 ENCOUNTER — OFFICE VISIT (OUTPATIENT)
Dept: INTERNAL MEDICINE CLINIC | Age: 63
End: 2018-10-23

## 2018-10-23 VITALS
HEART RATE: 69 BPM | HEIGHT: 69 IN | DIASTOLIC BLOOD PRESSURE: 88 MMHG | WEIGHT: 174 LBS | OXYGEN SATURATION: 98 % | SYSTOLIC BLOOD PRESSURE: 146 MMHG | BODY MASS INDEX: 25.77 KG/M2

## 2018-10-23 DIAGNOSIS — N39.0 URINARY TRACT INFECTION WITHOUT HEMATURIA, SITE UNSPECIFIED: Primary | ICD-10-CM

## 2018-10-23 LAB
BACTERIA UA POCT, BACTPOCT: NORMAL
BILIRUB UR QL STRIP: NEGATIVE
CASTS UA POCT: NORMAL
CLUE CELLS, CLUEPOCT: NEGATIVE
CRYSTALS UA POCT, CRYSPOCT: NEGATIVE
EPITHELIAL CELLS POCT: NEGATIVE
GLUCOSE UR-MCNC: NEGATIVE MG/DL
KETONES P FAST UR STRIP-MCNC: NEGATIVE MG/DL
MUCUS UA POCT, MUCPOCT: NORMAL
PH UR STRIP: 7 [PH] (ref 5–7)
PROT UR QL STRIP: NEGATIVE
RBC UA POCT, RBCPOCT: 0
SP GR UR STRIP: 1.01 (ref 1.01–1.02)
TRICH UA POCT, TRICHPOC: NEGATIVE
UA UROBILINOGEN AMB POC: NORMAL (ref 0.2–1)
URINALYSIS CLARITY POC: CLEAR
URINALYSIS COLOR POC: YELLOW
URINE BLOOD POC: NEGATIVE
URINE CULT COMMENT, POCT: NORMAL
URINE LEUKOCYTES POC: NEGATIVE
URINE NITRITES POC: NEGATIVE
WBC UA POCT, WBCPOCT: 0
YEAST UA POCT, YEASTPOC: NEGATIVE

## 2018-10-23 RX ORDER — SULFAMETHOXAZOLE AND TRIMETHOPRIM 800; 160 MG/1; MG/1
1 TABLET ORAL 2 TIMES DAILY
Qty: 14 TAB | Refills: 0 | Status: SHIPPED | OUTPATIENT
Start: 2018-10-23 | End: 2019-08-21 | Stop reason: ALTCHOICE

## 2018-10-23 NOTE — PATIENT INSTRUCTIONS
Urinary Tract Infection in Women: Care Instructions  Your Care Instructions    A urinary tract infection, or UTI, is a general term for an infection anywhere between the kidneys and the urethra (where urine comes out). Most UTIs are bladder infections. They often cause pain or burning when you urinate. UTIs are caused by bacteria and can be cured with antibiotics. Be sure to complete your treatment so that the infection goes away. Follow-up care is a key part of your treatment and safety. Be sure to make and go to all appointments, and call your doctor if you are having problems. It's also a good idea to know your test results and keep a list of the medicines you take. How can you care for yourself at home? · Take your antibiotics as directed. Do not stop taking them just because you feel better. You need to take the full course of antibiotics. · Drink extra water and other fluids for the next day or two. This may help wash out the bacteria that are causing the infection. (If you have kidney, heart, or liver disease and have to limit fluids, talk with your doctor before you increase your fluid intake.)  · Avoid drinks that are carbonated or have caffeine. They can irritate the bladder. · Urinate often. Try to empty your bladder each time. · To relieve pain, take a hot bath or lay a heating pad set on low over your lower belly or genital area. Never go to sleep with a heating pad in place. To prevent UTIs  · Drink plenty of water each day. This helps you urinate often, which clears bacteria from your system. (If you have kidney, heart, or liver disease and have to limit fluids, talk with your doctor before you increase your fluid intake.)  · Urinate when you need to. · Urinate right after you have sex. · Change sanitary pads often. · Avoid douches, bubble baths, feminine hygiene sprays, and other feminine hygiene products that have deodorants.   · After going to the bathroom, wipe from front to back.  When should you call for help? Call your doctor now or seek immediate medical care if:    · Symptoms such as fever, chills, nausea, or vomiting get worse or appear for the first time.     · You have new pain in your back just below your rib cage. This is called flank pain.     · There is new blood or pus in your urine.     · You have any problems with your antibiotic medicine.    Watch closely for changes in your health, and be sure to contact your doctor if:    · You are not getting better after taking an antibiotic for 2 days.     · Your symptoms go away but then come back. Where can you learn more? Go to http://kelechi-barry.info/. Enter I756 in the search box to learn more about \"Urinary Tract Infection in Women: Care Instructions. \"  Current as of: March 21, 2018  Content Version: 11.8  © 4662-3188 Healthwise, Incorporated. Care instructions adapted under license by [employer] (which disclaims liability or warranty for this information). If you have questions about a medical condition or this instruction, always ask your healthcare professional. [address] any warranty or liability for your use of this information.

## 2018-10-23 NOTE — PROGRESS NOTES
HISTORY OF PRESENT ILLNESS Sharonda Krause is a 59 y.o. female. Urinary Frequency Associated symptoms include frequency and abdominal pain. Abdominal Pain Associated symptoms include abdominal pain. Review of Systems Gastrointestinal: Positive for abdominal pain. Genitourinary: Positive for frequency. Physical Exam 
 
ASSESSMENT and PLAN 
{ASSESSMENT/PLAN:91306}

## 2018-10-23 NOTE — PROGRESS NOTES
Yusef Del Rio presents with   Chief Complaint   Patient presents with    Urinary Frequency    Abdominal Pain   Patient of Dr Franklin Calderón here with complaint of urinary frequency and abdominal pain that started over the weekend. No fever or chills noted. 1. Have you been to the ER, urgent care clinic since your last visit? Hospitalized since your last visit? No    2. Have you seen or consulted any other health care providers outside of the [address] [place] [address] since your last visit? Include any pap smears or colon screening.  No

## 2018-10-23 NOTE — PROGRESS NOTES
Subjective:  Ms. Rolando Egan is a pleasant 59year old lady, patient of Dr. Ingrid Butcher, who developed some lower abdominal discomfort associated with urinary frequency over the weekend. She has been drinking quite a bit of water and thought that her symptoms improved until last night, when she was up multiple times with urinary frequency. She does have a little bit of back pain, but she thinks that is from her RA. She denies any chills or fever. She denies any nausea or vomiting. She denies any past history of kidney stones. Past Medical History:   Diagnosis Date    Abdominal pain     Chronic rheumatic arthritis ([facility]     Hypertension     Melanoma of thoracic region [facility])      Past Surgical History:   Procedure Laterality Date    HX GYN       X4    HX OTHER SURGICAL      lymph node removed from Right arm benign       Current Outpatient Medications on File Prior to Visit   Medication Sig Dispense Refill    valsartan (DIOVAN) 320 mg tablet TAKE 1 TABLET EVERY DAY 90 Tab 0    amLODIPine (NORVASC) 2.5 mg tablet Take 1 Tab by mouth daily. 90 Tab 3    etanercept (ENBREL) 50 mg/mL (0.98 mL) injection 50 mg by SubCUTAneous route every seven (7) days. Indications: RHEUMATOID ARTHRITIS      sulfaSALAzine (AZULFIDINE) 500 mg tablet Take 500 mg by mouth two (2) times a day. Take two tablets twice daily   Indications: RHEUMATOID ARTHRITIS      hydroxychloroquine (PLAQUINIL) 200 mg tablet Take 200 mg by mouth two (2) times a day. Take one tablet twice daily       acetaminophen (TYLENOL) 500 mg tablet Take 1,000 mg by mouth every six (6) hours as needed. No current facility-administered medications on file prior to visit. Allergies   Allergen Reactions    Levaquin [Levofloxacin] Rash    Codeine Rash    Pcn [Penicillins] Rash    Seafood [Shellfish Containing Products] Shortness of Breath       Physical Examination:  GENERAL:  Pleasant lady in no acute distress.  She is alert and oriented. VITALS:  BP: initially 183/99, repeated by myself 146/88. P: 69.  O2 sat: 98. NECK:  Supple without adenopathy. CHEST:  Lungs clear to auscultation, no rales or wheezes. BACK:  No CVAT. CV:  Heart regular rhythm without murmur or gallop. ABDOMEN:  Bowel sounds present, soft, non tender. No organomegaly or masses. EXTREMITIES:  No edema or calf tenderness. Distal pulses were present. Studies:  Stat UA was entirely normal without any presence of bact, WBCs or RBCs. Impression:  1. Probable urethritis. Plan:  1. It was opted to start her on Bactrim DS twice daily for seven days. She will increase fluids and rest.  2. She certainly will follow up should she fail to improve or if her condition worsens. 3. She is encouraged to increase her fluids.

## 2019-01-26 RX ORDER — VALSARTAN 320 MG/1
TABLET ORAL
Qty: 90 TAB | Refills: 0 | Status: SHIPPED | OUTPATIENT
Start: 2019-01-26 | End: 2019-04-23 | Stop reason: SDUPTHER

## 2019-04-23 RX ORDER — VALSARTAN 320 MG/1
TABLET ORAL
Qty: 90 TAB | Refills: 0 | Status: SHIPPED | OUTPATIENT
Start: 2019-04-23 | End: 2019-07-16 | Stop reason: SDUPTHER

## 2019-07-16 RX ORDER — AMLODIPINE BESYLATE 2.5 MG/1
TABLET ORAL
Qty: 90 TAB | Refills: 0 | Status: SHIPPED | OUTPATIENT
Start: 2019-07-16 | End: 2019-10-25 | Stop reason: SDUPTHER

## 2019-07-16 RX ORDER — VALSARTAN 320 MG/1
TABLET ORAL
Qty: 90 TAB | Refills: 0 | Status: SHIPPED | OUTPATIENT
Start: 2019-07-16 | End: 2019-10-25 | Stop reason: SDUPTHER

## 2019-07-16 NOTE — TELEPHONE ENCOUNTER
Pharmacy on file verified  Requested Prescriptions     Pending Prescriptions Disp Refills    valsartan (DIOVAN) 320 mg tablet 90 Tab 0    amLODIPine (NORVASC) 2.5 mg tablet 90 Tab 0     LOV 10/23/18  NOV 8/21/19  LF Valsartan- 4/23/19       Amlodipine- 5/6/19

## 2019-08-21 ENCOUNTER — OFFICE VISIT (OUTPATIENT)
Dept: INTERNAL MEDICINE CLINIC | Age: 64
End: 2019-08-21

## 2019-08-21 VITALS
TEMPERATURE: 97.5 F | SYSTOLIC BLOOD PRESSURE: 136 MMHG | BODY MASS INDEX: 25.98 KG/M2 | HEART RATE: 71 BPM | OXYGEN SATURATION: 99 % | HEIGHT: 69 IN | DIASTOLIC BLOOD PRESSURE: 86 MMHG | RESPIRATION RATE: 16 BRPM | WEIGHT: 175.4 LBS

## 2019-08-21 DIAGNOSIS — M06.9 RHEUMATOID ARTHRITIS INVOLVING MULTIPLE SITES, UNSPECIFIED RHEUMATOID FACTOR PRESENCE: ICD-10-CM

## 2019-08-21 DIAGNOSIS — C43.59 MELANOMA OF THORACIC REGION (HCC): ICD-10-CM

## 2019-08-21 DIAGNOSIS — I10 ESSENTIAL HYPERTENSION: ICD-10-CM

## 2019-08-21 DIAGNOSIS — Z00.00 ROUTINE PHYSICAL EXAMINATION: Primary | ICD-10-CM

## 2019-08-21 LAB
A-G RATIO,AGRAT: 1.4 RATIO
ALBUMIN SERPL-MCNC: 4.6 G/DL (ref 3.9–5.4)
ALP SERPL-CCNC: 122 U/L (ref 38–126)
ALT SERPL-CCNC: 32 U/L (ref 0–35)
ANION GAP SERPL CALC-SCNC: 12 MMOL/L
AST SERPL W P-5'-P-CCNC: 32 U/L (ref 14–36)
BILIRUB SERPL-MCNC: 0.2 MG/DL (ref 0.2–1.3)
BILIRUB UR QL: NEGATIVE
BUN SERPL-MCNC: 17 MG/DL (ref 7–17)
BUN/CREATININE RATIO,BUCR: 34 RATIO
CALCIUM SERPL-MCNC: 10 MG/DL (ref 8.4–10.2)
CHLORIDE SERPL-SCNC: 95 MMOL/L (ref 98–107)
CHOL/HDL RATIO,CHHD: 2 RATIO (ref 0–4)
CHOLEST SERPL-MCNC: 172 MG/DL (ref 0–200)
CLARITY: CLEAR
CO2 SERPL-SCNC: 27 MMOL/L (ref 22–32)
COLOR UR: ABNORMAL
CREAT SERPL-MCNC: 0.5 MG/DL (ref 0.7–1.2)
GLOBULIN,GLOB: 3.4
GLUCOSE 24H UR-MRATE: NEGATIVE G/(24.H)
GLUCOSE SERPL-MCNC: 84 MG/DL (ref 65–105)
HDLC SERPL-MCNC: 72 MG/DL (ref 35–130)
HGB UR QL STRIP: ABNORMAL
KETONES UR QL STRIP.AUTO: NEGATIVE
LDL/HDL RATIO,LDHD: 1 RATIO
LDLC SERPL CALC-MCNC: 88 MG/DL (ref 0–130)
LEUKOCYTE ESTERASE: NEGATIVE
NITRITE UR QL STRIP.AUTO: NEGATIVE
PH UR STRIP: 6 [PH] (ref 5–7)
POTASSIUM SERPL-SCNC: 4.9 MMOL/L (ref 3.6–5)
PROT SERPL-MCNC: 8 G/DL (ref 6.3–8.2)
PROT UR STRIP-MCNC: ABNORMAL MG/DL
RBC #/AREA URNS HPF: ABNORMAL #/HPF
SODIUM SERPL-SCNC: 134 MMOL/L (ref 137–145)
SP GR UR REFRACTOMETRY: 1.02 (ref 1–1.03)
SQUAMOUS EPITHELIAL CELLS: ABNORMAL
TRIGL SERPL-MCNC: 58 MG/DL (ref 0–200)
UROBILINOGEN UR QL STRIP.AUTO: NEGATIVE
VLDLC SERPL CALC-MCNC: 12 MG/DL
WBC URNS QL MICRO: ABNORMAL #/HPF

## 2019-08-21 NOTE — PROGRESS NOTES
Subjective: This note will not be viewable in [address]        62 y.o. female for annual Comprehensive personal healthcare examination. Mrs. Ronak Carr is a 29-year-old  female who presents the office today for complete checkup    Medical problems include hypertension. The patient currently is on valsartan and amlodipine. She does have lability especially if she goes to a doctor's office but brings with her a log of recordings over the last 2 weeks with systolics in the 001N and diastolics in the 09Y to 70O. When she goes to doctor's offices it usually quite high. She exercises by swimming several times a week and follows a no added salt diet she is compliant with her medications. She denies headaches, numbness, tingling or focal neurological problems. The patient has rheumatoid arthritis and is followed by Dr. Athena Hatchet at [address] [place] [address]. She is now on a regimen of Enbrel, Azulfidine and Plaquenil. She has been doing well and only has minor flareups from time to time. There is a prior history of sepsis on her medication and she also has a prior history of melanomas x2. She has had no further infections and her melanomas are being in my to 6 months by dermatology. History of present illness: This patient has multiple medical problems.   These include:   Patient Active Problem List   Diagnosis Code    Rheumatoid arthritis ([address] M06.9    Abdominal pain R10.9    Elevated liver function tests R94.5    Sepsis(995.91) A41.9    Melanoma of thoracic region ([address] C43.59    HTN (hypertension) I10    Adverse drug reaction T50.905A          Past Medical History:   Diagnosis Date    Abdominal pain     Chronic rheumatic arthritis ([address]     Hypertension     Melanoma of thoracic region [facility])      Past Surgical History:   Procedure Laterality Date    HX GYN       X2    HX MALIGNANT SKIN LESION EXCISION      melanoma x2    HX OTHER SURGICAL      lymph node removed from Right arm benign Allergies   Allergen Reactions    Shellfish Derived Shortness of Breath    Levaquin [Levofloxacin] Rash    Codeine Rash    Pcn [Penicillins] Rash    Seafood [Shellfish Containing Products] Shortness of Breath     Current Outpatient Medications   Medication Sig Dispense Refill    valsartan (DIOVAN) 320 mg tablet TAKE 1 TABLET EVERY DAY 90 Tab 0    amLODIPine (NORVASC) 2.5 mg tablet TAKE 1 TABLET EVERY DAY 90 Tab 0    etanercept (ENBREL) 50 mg/mL (0.98 mL) injection 50 mg by SubCUTAneous route every seven (7) days. Indications: RHEUMATOID ARTHRITIS      sulfaSALAzine (AZULFIDINE) 500 mg tablet Take 500 mg by mouth two (2) times a day. Take two tablets twice daily   Indications: RHEUMATOID ARTHRITIS      hydroxychloroquine (PLAQUINIL) 200 mg tablet Take 200 mg by mouth two (2) times a day. Take one tablet twice daily       acetaminophen (TYLENOL) 500 mg tablet Take 1,000 mg by mouth every six (6) hours as needed.          Social History     Socioeconomic History    Marital status:      Spouse name: Not on file    Number of children: 2    Years of education: Not on file    Highest education level: Not on file   Tobacco Use    Smoking status: Former Smoker     Packs/day: 1.00     Years: 1.00     Pack years: 1.00     Types: Cigarettes, Cigars    Smokeless tobacco: Former User     Quit date: 1974    Tobacco comment: quit 1990   Substance and Sexual Activity    Alcohol use: No     Family History   Problem Relation Age of Onset    Breast Cancer Mother     Heart Disease Father     Dementia Father        Health Maintenance   Topic Date Due    PAP AKA CERVICAL CYTOLOGY  07/11/1977    Shingrix Vaccine Age 50> (1 of 2) 07/11/2006    BREAST CANCER SCRN MAMMOGRAM  05/05/2017    Influenza Age 9 to Adult  08/01/2019    COLONOSCOPY  04/30/2020    DTaP/Tdap/Td series (2 - Td) 08/14/2028    Hepatitis C Screening  Completed    Pneumococcal 0-64 years  Aged Out       Review of Systems  Constitutional:  She denies fever, weight loss, sweats or fatigue. HEENT:  No blurred or double vision, headache or dizziness. No difficulty with swallowing, taste, speech or smell. Respiratory:  No cough, wheezing or shortness of breath. No sputum production. Cardiac:  Denies chest pain, palpitations, unexplained indigestion, syncope, edema, PND or orthopnea. GI:  No changes in bowel movements, no abdominal pain, no bloating, anorexia, nausea, vomiting or heartburn. :  No frequency or dysuria. Denies incontinence. Extremities: Positive for generalized low-grade joint pain  Skin:  No recent rashes or mole changes. Neurological:  No numbness, tingling, burning paresthesias or loss of motor strength. No syncope, dizziness, frequent headaches or memory loss. ROS otherwise negative      Objective:     Vitals:    08/21/19 1456 08/21/19 1459   BP: 146/84 140/88   Pulse: 71    Resp: 16    Temp: 97.5 °F (36.4 °C)    TempSrc: Oral    SpO2: 99%    Weight: 175 lb 6.4 oz (79.6 kg)    Height: 5' 9\" (1.753 m)    PainSc:   0 - No pain      Body mass index is 25.9 kg/m². Physical Examination:   General Appearance:  Well-developed, well-nourished, no acute distress. Vision:  Deferred to ophthalmologist.       HEENT:    Ears:  The TMs and ear canals were clear. Eyes:  The pupillary responses were normal.  Extraocular muscle function intact. Lids and conjunctiva not injected. No conjunctiva redness or drainage. Pharynx:  Clear with teeth in good repair. No masses were noted. Neck:  Supple without thyromegaly or adenopathy. No JVD noted. No carotid bruits. Lungs:  Clear to auscultation and percussion. Cardiac:  Regular rate and rhythm without murmur. PMI not displaced. No gallop, rub or click. Abdomen:  Flat, soft, non-tender without palpable organomegaly or mass. No pulsatile mass was felt, and no bruit was heard. Bowel sounds were active.   Breasts:  Deferred to GYN  GYN: Deferred to GYN Rectal exam: Deferred to GYN    Extremities:  No clubbing, cyanosis or edema. Pulses:  Dorsalis pedis and posterior tibial pulses felt without difficulty. Skin:  No rash or unusual mole changes noted. Lymph Nodes:  None felt in the cervical, supraclavicular, axillary or inguinal region. Neurological:  Cranial nerves II-XII grossly intact. Motor strength 5/5. DTRs 2+ and symmetric. Station and gait normal.  Physical exam otherwise negative        Assessment/Plan:     Diagnoses and all orders for this visit:    Routine physical examination  -     COLLECTION VENOUS BLOOD,VENIPUNCTURE  -     LIPID PANEL  -     METABOLIC PANEL, COMPREHENSIVE  -     TSH 3RD GENERATION  -     URINALYSIS W/MICROSCOPIC  -     CBC WITH AUTOMATED DIFF    Rheumatoid arthritis involving multiple sites, unspecified rheumatoid factor presence (HCC)    Essential hypertension    Melanoma of thoracic region [facility])        Other instructions: The patient's medications were reviewed and reconciled. The patient's blood pressure seems to be fairly well controlled and she does have some lability in her blood pressure recordings. She will continue her current regimen along with a no added salt diet and her exercise regimen as she is doing. Health maintenance issues were reviewed and she is due for a Pap test and mammogram in the next week and will have copies of these reports sent to us. Age-appropriate vaccinations were reviewed and she will receive an influenza vaccination this fall and shingles vaccination has been recommended as well. Continued follow-up at [address] [place] [address] in regards to her rheumatoid arthritis    Await results of multiple labs    Follow-up yearly    Follow-up and Dispositions    · Return in about 6 months (around 2/21/2020).          Everton Curry MD

## 2019-08-21 NOTE — PROGRESS NOTES
Mali Arevalo is a 62 y.o. female presenting for Complete Physical  .     1. Have you been to the ER, urgent care clinic since your last visit? Hospitalized since your last visit? No    2. Have you seen or consulted any other health care providers outside of the [address] [place] [address] since your last visit? Include any pap smears or colon screening. Rheumatologist & Dermatologist follow ups. No flowsheet data found. Abuse Screening Questionnaire 8/21/2019   Do you ever feel afraid of your partner? N   Are you in a relationship with someone who physically or mentally threatens you? N   Is it safe for you to go home? Y       3 most recent PHQ Screens 8/21/2019   Little interest or pleasure in doing things Not at all   Feeling down, depressed, irritable, or hopeless Not at all   Total Score PHQ 2 0       There are no discontinued medications.

## 2019-08-21 NOTE — PATIENT INSTRUCTIONS

## 2019-08-22 LAB
BASOPHILS # BLD AUTO: 0 X10E3/UL (ref 0–0.2)
BASOPHILS NFR BLD AUTO: 1 %
EOSINOPHIL # BLD AUTO: 0.2 X10E3/UL (ref 0–0.4)
EOSINOPHIL NFR BLD AUTO: 3 %
ERYTHROCYTE [DISTWIDTH] IN BLOOD BY AUTOMATED COUNT: 13.3 % (ref 12.3–15.4)
HCT VFR BLD AUTO: 39.5 % (ref 34–46.6)
HGB BLD-MCNC: 12.8 G/DL (ref 11.1–15.9)
IMM GRANULOCYTES # BLD AUTO: 0 X10E3/UL (ref 0–0.1)
IMM GRANULOCYTES NFR BLD AUTO: 0 %
LYMPHOCYTES # BLD AUTO: 1.9 X10E3/UL (ref 0.7–3.1)
LYMPHOCYTES NFR BLD AUTO: 35 %
MCH RBC QN AUTO: 30.1 PG (ref 26.6–33)
MCHC RBC AUTO-ENTMCNC: 32.4 G/DL (ref 31.5–35.7)
MCV RBC AUTO: 93 FL (ref 79–97)
MONOCYTES # BLD AUTO: 0.6 X10E3/UL (ref 0.1–0.9)
MONOCYTES NFR BLD AUTO: 11 %
NEUTROPHILS # BLD AUTO: 2.7 X10E3/UL (ref 1.4–7)
NEUTROPHILS NFR BLD AUTO: 50 %
PLATELET # BLD AUTO: 327 X10E3/UL (ref 150–450)
RBC # BLD AUTO: 4.25 X10E6/UL (ref 3.77–5.28)
TSH SERPL DL<=0.05 MIU/L-ACNC: 2.04 UIU/ML (ref 0.34–5.6)
WBC # BLD AUTO: 5.4 X10E3/UL (ref 3.4–10.8)

## 2019-10-25 RX ORDER — VALSARTAN 320 MG/1
TABLET ORAL
Qty: 90 TAB | Refills: 0 | Status: SHIPPED | OUTPATIENT
Start: 2019-10-25 | End: 2020-01-20

## 2019-10-25 RX ORDER — AMLODIPINE BESYLATE 2.5 MG/1
TABLET ORAL
Qty: 90 TAB | Refills: 0 | Status: SHIPPED | OUTPATIENT
Start: 2019-10-25 | End: 2020-03-25 | Stop reason: SDUPTHER

## 2020-01-20 RX ORDER — VALSARTAN 320 MG/1
TABLET ORAL
Qty: 90 TAB | Refills: 0 | Status: SHIPPED | OUTPATIENT
Start: 2020-01-20 | End: 2020-03-25

## 2020-03-25 RX ORDER — AMLODIPINE BESYLATE 2.5 MG/1
TABLET ORAL
Qty: 90 TAB | Refills: 0 | Status: SHIPPED | OUTPATIENT
Start: 2020-03-25 | End: 2020-06-09

## 2020-03-25 RX ORDER — VALSARTAN 320 MG/1
TABLET ORAL
Qty: 90 TAB | Refills: 0 | Status: SHIPPED | OUTPATIENT
Start: 2020-03-25 | End: 2020-06-18

## 2020-06-09 RX ORDER — AMLODIPINE BESYLATE 2.5 MG/1
TABLET ORAL
Qty: 90 TAB | Refills: 0 | Status: SHIPPED | OUTPATIENT
Start: 2020-06-09 | End: 2020-08-23

## 2020-06-18 RX ORDER — VALSARTAN 320 MG/1
TABLET ORAL
Qty: 90 TAB | Refills: 0 | Status: SHIPPED | OUTPATIENT
Start: 2020-06-18 | End: 2020-09-02

## 2020-08-23 RX ORDER — AMLODIPINE BESYLATE 2.5 MG/1
TABLET ORAL
Qty: 90 TAB | Refills: 0 | Status: SHIPPED | OUTPATIENT
Start: 2020-08-23 | End: 2020-11-06

## 2020-09-02 RX ORDER — VALSARTAN 320 MG/1
TABLET ORAL
Qty: 90 TAB | Refills: 0 | Status: SHIPPED | OUTPATIENT
Start: 2020-09-02 | End: 2020-11-16

## 2020-11-06 RX ORDER — AMLODIPINE BESYLATE 2.5 MG/1
TABLET ORAL
Qty: 90 TAB | Refills: 0 | Status: SHIPPED | OUTPATIENT
Start: 2020-11-06 | End: 2021-01-22

## 2020-11-16 RX ORDER — VALSARTAN 320 MG/1
TABLET ORAL
Qty: 90 TAB | Refills: 0 | Status: SHIPPED | OUTPATIENT
Start: 2020-11-16 | End: 2021-01-31

## 2020-12-08 ENCOUNTER — TELEPHONE (OUTPATIENT)
Dept: INTERNAL MEDICINE CLINIC | Age: 65
End: 2020-12-08

## 2020-12-08 NOTE — TELEPHONE ENCOUNTER
Patient has an appt 12/9 @ 8:30 am. She is concerned about coming into the office due to covid. Her father just passed away and she is run down. Do you recommend a virtual visit or reschedule?      [phone]

## 2020-12-08 NOTE — TELEPHONE ENCOUNTER
Patient rescheduled for:  Next Appt  1/14/21 - Rosie Blanton MD - [facility] Kylie Owen

## 2021-01-22 RX ORDER — AMLODIPINE BESYLATE 2.5 MG/1
TABLET ORAL
Qty: 90 TAB | Refills: 0 | Status: SHIPPED | OUTPATIENT
Start: 2021-01-22 | End: 2021-04-07

## 2021-01-31 RX ORDER — VALSARTAN 320 MG/1
TABLET ORAL
Qty: 90 TAB | Refills: 0 | Status: SHIPPED | OUTPATIENT
Start: 2021-01-31 | End: 2021-04-16

## 2021-04-07 RX ORDER — AMLODIPINE BESYLATE 2.5 MG/1
TABLET ORAL
Qty: 90 TAB | Refills: 0 | Status: SHIPPED | OUTPATIENT
Start: 2021-04-07 | End: 2021-06-21

## 2021-04-16 RX ORDER — VALSARTAN 320 MG/1
TABLET ORAL
Qty: 90 TAB | Refills: 0 | Status: SHIPPED | OUTPATIENT
Start: 2021-04-16 | End: 2021-06-30

## 2021-05-14 ENCOUNTER — OFFICE VISIT (OUTPATIENT)
Dept: INTERNAL MEDICINE CLINIC | Age: 66
End: 2021-05-14
Payer: MEDICARE

## 2021-05-14 VITALS
WEIGHT: 175.2 LBS | OXYGEN SATURATION: 96 % | HEART RATE: 73 BPM | HEIGHT: 69 IN | SYSTOLIC BLOOD PRESSURE: 146 MMHG | BODY MASS INDEX: 25.95 KG/M2 | RESPIRATION RATE: 16 BRPM | TEMPERATURE: 96.8 F | DIASTOLIC BLOOD PRESSURE: 86 MMHG

## 2021-05-14 DIAGNOSIS — I10 ESSENTIAL HYPERTENSION: ICD-10-CM

## 2021-05-14 DIAGNOSIS — R22.9 SUBCUTANEOUS NODULE: ICD-10-CM

## 2021-05-14 DIAGNOSIS — C43.59 MELANOMA OF THORACIC REGION (HCC): ICD-10-CM

## 2021-05-14 DIAGNOSIS — Z00.00 INITIAL MEDICARE ANNUAL WELLNESS VISIT: Primary | ICD-10-CM

## 2021-05-14 DIAGNOSIS — M06.9 RHEUMATOID ARTHRITIS INVOLVING MULTIPLE SITES, UNSPECIFIED WHETHER RHEUMATOID FACTOR PRESENT (HCC): ICD-10-CM

## 2021-05-14 LAB
ALBUMIN SERPL-MCNC: 4.5 G/DL (ref 3.5–5)
ALBUMIN/GLOB SERPL: 1.3 {RATIO} (ref 1.1–2.2)
ALP SERPL-CCNC: 92 U/L (ref 45–117)
ALT SERPL-CCNC: 32 U/L (ref 12–78)
ANION GAP SERPL CALC-SCNC: 6 MMOL/L (ref 5–15)
APPEARANCE UR: CLEAR
AST SERPL-CCNC: 24 U/L (ref 15–37)
BACTERIA URNS QL MICRO: ABNORMAL /HPF
BASOPHILS # BLD: 0.1 K/UL (ref 0–0.1)
BASOPHILS NFR BLD: 1 % (ref 0–1)
BILIRUB SERPL-MCNC: 0.4 MG/DL (ref 0.2–1)
BILIRUB UR QL: NEGATIVE
BUN SERPL-MCNC: 16 MG/DL (ref 6–20)
BUN/CREAT SERPL: 35 (ref 12–20)
CALCIUM SERPL-MCNC: 10 MG/DL (ref 8.5–10.1)
CHLORIDE SERPL-SCNC: 102 MMOL/L (ref 97–108)
CHOLEST SERPL-MCNC: 196 MG/DL
CO2 SERPL-SCNC: 26 MMOL/L (ref 21–32)
COLOR UR: ABNORMAL
CREAT SERPL-MCNC: 0.46 MG/DL (ref 0.55–1.02)
DIFFERENTIAL METHOD BLD: NORMAL
EOSINOPHIL # BLD: 0.1 K/UL (ref 0–0.4)
EOSINOPHIL NFR BLD: 2 % (ref 0–7)
EPITH CASTS URNS QL MICRO: ABNORMAL /LPF
ERYTHROCYTE [DISTWIDTH] IN BLOOD BY AUTOMATED COUNT: 12.3 % (ref 11.5–14.5)
GLOBULIN SER CALC-MCNC: 3.4 G/DL (ref 2–4)
GLUCOSE SERPL-MCNC: 79 MG/DL (ref 65–100)
GLUCOSE UR STRIP.AUTO-MCNC: NEGATIVE MG/DL
HCT VFR BLD AUTO: 42.7 % (ref 35–47)
HDLC SERPL-MCNC: 84 MG/DL
HDLC SERPL: 2.3 {RATIO} (ref 0–5)
HGB BLD-MCNC: 13.6 G/DL (ref 11.5–16)
HGB UR QL STRIP: NEGATIVE
IMM GRANULOCYTES # BLD AUTO: 0 K/UL (ref 0–0.04)
IMM GRANULOCYTES NFR BLD AUTO: 0 % (ref 0–0.5)
KETONES UR QL STRIP.AUTO: NEGATIVE MG/DL
LDLC SERPL CALC-MCNC: 104.4 MG/DL (ref 0–100)
LEUKOCYTE ESTERASE UR QL STRIP.AUTO: NEGATIVE
LYMPHOCYTES # BLD: 1.7 K/UL (ref 0.8–3.5)
LYMPHOCYTES NFR BLD: 34 % (ref 12–49)
MCH RBC QN AUTO: 30.1 PG (ref 26–34)
MCHC RBC AUTO-ENTMCNC: 31.9 G/DL (ref 30–36.5)
MCV RBC AUTO: 94.5 FL (ref 80–99)
MONOCYTES # BLD: 0.5 K/UL (ref 0–1)
MONOCYTES NFR BLD: 10 % (ref 5–13)
NEUTS SEG # BLD: 2.6 K/UL (ref 1.8–8)
NEUTS SEG NFR BLD: 53 % (ref 32–75)
NITRITE UR QL STRIP.AUTO: POSITIVE
NRBC # BLD: 0 K/UL (ref 0–0.01)
NRBC BLD-RTO: 0 PER 100 WBC
PH UR STRIP: 6 [PH] (ref 5–8)
PLATELET # BLD AUTO: 311 K/UL (ref 150–400)
PMV BLD AUTO: 9 FL (ref 8.9–12.9)
POTASSIUM SERPL-SCNC: 4.8 MMOL/L (ref 3.5–5.1)
PROT SERPL-MCNC: 7.9 G/DL (ref 6.4–8.2)
PROT UR STRIP-MCNC: NEGATIVE MG/DL
RBC # BLD AUTO: 4.52 M/UL (ref 3.8–5.2)
RBC #/AREA URNS HPF: ABNORMAL /HPF (ref 0–5)
SODIUM SERPL-SCNC: 134 MMOL/L (ref 136–145)
SP GR UR REFRACTOMETRY: 1.01 (ref 1–1.03)
TRIGL SERPL-MCNC: 38 MG/DL (ref ?–150)
TSH SERPL DL<=0.05 MIU/L-ACNC: 1.51 UIU/ML (ref 0.36–3.74)
UA: UC IF INDICATED,UAUC: ABNORMAL
UROBILINOGEN UR QL STRIP.AUTO: 0.2 EU/DL (ref 0.2–1)
VLDLC SERPL CALC-MCNC: 7.6 MG/DL
WBC # BLD AUTO: 5 K/UL (ref 3.6–11)
WBC URNS QL MICRO: ABNORMAL /HPF (ref 0–4)

## 2021-05-14 PROCEDURE — G8427 DOCREV CUR MEDS BY ELIG CLIN: HCPCS | Performed by: INTERNAL MEDICINE

## 2021-05-14 PROCEDURE — G8753 SYS BP > OR = 140: HCPCS | Performed by: INTERNAL MEDICINE

## 2021-05-14 PROCEDURE — G8510 SCR DEP NEG, NO PLAN REQD: HCPCS | Performed by: INTERNAL MEDICINE

## 2021-05-14 PROCEDURE — G8419 CALC BMI OUT NRM PARAM NOF/U: HCPCS | Performed by: INTERNAL MEDICINE

## 2021-05-14 PROCEDURE — G8754 DIAS BP LESS 90: HCPCS | Performed by: INTERNAL MEDICINE

## 2021-05-14 PROCEDURE — 3017F COLORECTAL CA SCREEN DOC REV: CPT | Performed by: INTERNAL MEDICINE

## 2021-05-14 PROCEDURE — G0438 PPPS, INITIAL VISIT: HCPCS | Performed by: INTERNAL MEDICINE

## 2021-05-14 NOTE — PROGRESS NOTES
Chief Complaint   Patient presents with    Follow Up Chronic Condition     6 mo fu    Annual Wellness Visit       Depression Risk Factor Screening:     3 most recent PHQ Screens 5/14/2021   Little interest or pleasure in doing things Not at all   Feeling down, depressed, irritable, or hopeless Not at all   Total Score PHQ 2 0       Functional Ability and Level of Safety:     Activities of Daily Living  ADL Assessment 5/14/2021   Feeding yourself No Help Needed   Getting from bed to chair No Help Needed   Getting dressed No Help Needed   Bathing or showering No Help Needed   Walk across the room (includes cane/walker) No Help Needed   Using the telphone No Help Needed   Taking your medications No Help Needed   Preparing meals No Help Needed   Managing money (expenses/bills) No Help Needed   Moderately strenuous housework (laundry) No Help Needed   Shopping for personal items (toiletries/medicines) No Help Needed   Shopping for groceries No Help Needed   Driving No Help Needed   Climbing a flight of stairs No Help Needed   Getting to places beyond walking distances No Help Needed       Fall Risk  No flowsheet data found. Abuse Screen  Abuse Screening Questionnaire 5/14/2021   Do you ever feel afraid of your partner? N   Are you in a relationship with someone who physically or mentally threatens you? N   Is it safe for you to go home?  Y         Patient Care Team   Patient Care Team:  Jaspreet Quinones MD as PCP - General (Internal Medicine)  Jaspreet Quinones MD as PCP - [facility] Empaneled Provider  Zackery Sher MD (Obstetrics & Gynecology)

## 2021-05-14 NOTE — PROGRESS NOTES
[place] is a 60 y.o. female who presents for an Initial Medicare Annual Wellness Exam (AWV) and follow up of chronic medical conditions. I have reviewed the patient's medical history in detail and updated the computerized patient record. History     Subjective:  Mrs. Miles Carmona presents to the office today for a Medicare wellness check and follow-up of a number of medical issues. The patient has hypertension and currently remains on amlodipine and valsartan. She notes that her blood pressures at home have been ranging from 497993/3930. Patient tolerates her medications without any cough, lower extremity edema or orthostatic dizziness. She denies headaches, numbness, tingling or focal neurological problems. She has rheumatoid arthritis and is followed at [address] [place] [address] by Dr. Lois Pena. She remains on Enbrel, Azulfidine and Plaquenil. She has had no long-term side effects from her medication and no flareup of her disease. She is due for follow-up lab testing. She has a history of melanoma x2 with one under her right breast and one on her right back. She has had no long-term complications but did feel the development of a small subcutaneous nodule in her left upper chest.  This has been looked at by her dermatologist who felt that it needed to be taken out by a surgeon. Patient has been well during the pandemic and has had her [employer] vaccination.     Patient Active Problem List   Diagnosis Code    Rheumatoid arthritis ([facility] M06.9    Abdominal pain R10.9    Elevated liver function tests R79.89    Sepsis, unspecified ([facility] A41.9    History of melanoma Z85.820    HTN (hypertension) I10    Adverse drug reaction T50.905A       Patient Care Team:  Maynor Cassidy MD as PCP - General (Internal Medicine)  Maynor Cassidy MD as PCP - [facility] Empaneled Provider  Sophia House MD (Obstetrics & Gynecology)    Past Medical History:   Diagnosis Date    Abdominal pain     Chronic rheumatic arthritis ([facility]     Hypertension     Melanoma of thoracic region [facility])      Past Surgical History:   Procedure Laterality Date    HX GYN       X2    HX MALIGNANT SKIN LESION EXCISION      melanoma x2    HX OTHER SURGICAL      lymph node removed from Right arm benign     Allergies   Allergen Reactions    Shellfish Derived Shortness of Breath    Levaquin [Levofloxacin] Rash    Codeine Rash    Pcn [Penicillins] Rash    Seafood [Shellfish Containing Products] Shortness of Breath     Current Outpatient Medications   Medication Sig Dispense Refill    valsartan (DIOVAN) 320 mg tablet TAKE 1 TABLET EVERY DAY 90 Tab 0    amLODIPine (NORVASC) 2.5 mg tablet TAKE 1 TABLET EVERY DAY 90 Tab 0    etanercept (ENBREL) 50 mg/mL (0.98 mL) injection 50 mg by SubCUTAneous route every seven (7) days. Indications: RHEUMATOID ARTHRITIS      sulfaSALAzine (AZULFIDINE) 500 mg tablet Take 500 mg by mouth two (2) times a day. Take two tablets twice daily   Indications: RHEUMATOID ARTHRITIS      hydroxychloroquine (PLAQUINIL) 200 mg tablet Take 200 mg by mouth two (2) times a day. Take one tablet twice daily       acetaminophen (TYLENOL) 500 mg tablet Take 1,000 mg by mouth every six (6) hours as needed.          Social History     Socioeconomic History    Marital status:      Spouse name: Not on file    Number of children: 2    Years of education: Not on file    Highest education level: Not on file   Tobacco Use    Smoking status: Former Smoker     Packs/day: 1.00     Years: 1.00     Pack years: 1.00     Types: Cigarettes, Cigars    Smokeless tobacco: Former User     Quit date:     Tobacco comment: quit    Substance and Sexual Activity    Alcohol use: No     Family History   Problem Relation Age of Onset    Breast Cancer Mother     Heart Disease Father     Dementia Father      Health Maintenance   Topic Date Due    PAP AKA CERVICAL CYTOLOGY  Never done    Shingrix Vaccine Age 50> (1 of 2) Never done    Breast Cancer Screen Mammogram  05/05/2016    Colorectal Cancer Screening Combo  04/30/2020    Bone Densitometry (Dexa) Screening  07/11/2021    Flu Vaccine (Season Ended) 09/01/2021    Medicare Yearly Exam  05/15/2022    Lipid Screen  08/21/2024    DTaP/Tdap/Td series (2 - Td) 08/14/2028    Hepatitis C Screening  Completed    COVID-19 Vaccine  Completed    Pneumococcal 0-64 years  Aged Out          Review of Systems  Constitutional: negative for fevers, chills, anorexia and weight loss  Eyes:   negative for visual disturbance and irritation  ENT:   negative for tinnitus,sore throat,nasal congestion,ear pain,hoarseness  Respiratory:  negative for cough, hemoptysis, dyspnea,wheezing  CV:   negative for chest pain, palpitations, lower extremity edema  GI:   negative for nausea, vomiting, diarrhea, abdominal pain,melena  Endo:               negative for polyuria,polydipsia,polyphagia,heat intolerance  Genitourinary: negative for frequency, dysuria and hematuria  Integumentary: Positive for small nodule in skin of the left upper chest  Hematologic:  negative for easy bruising and gum/nose bleeding  Musculoskel: negative for myalgias, arthralgias, back pain, muscle weakness, joint pain  Neurological:  negative for headaches, dizziness, vertigo, memory problems and gait   Behavl/Psych: negative for feelings of anxiety, depression, mood changes  ROS otherwise negative      Depression Risk Factor Screening:     3 most recent PHQ Screens 5/14/2021   Little interest or pleasure in doing things Not at all   Feeling down, depressed, irritable, or hopeless Not at all   Total Score PHQ 2 0       Alcohol Risk Factor Screening:   Do you average more than 1 drink per night or more than 7 drinks a week:  No    On any one occasion in the past three months have you have had more than 3 drinks containing alcohol:  No    Functional Ability and Level of Safety:   Hearing Loss  Hearing is good.    Activities of Daily Living  ADL Assessment 5/14/2021   Feeding yourself No Help Needed   Getting from bed to chair No Help Needed   Getting dressed No Help Needed   Bathing or showering No Help Needed   Walk across the room (includes cane/walker) No Help Needed   Using the telphone No Help Needed   Taking your medications No Help Needed   Preparing meals No Help Needed   Managing money (expenses/bills) No Help Needed   Moderately strenuous housework (laundry) No Help Needed   Shopping for personal items (toiletries/medicines) No Help Needed   Shopping for groceries No Help Needed   Driving No Help Needed   Climbing a flight of stairs No Help Needed   Getting to places beyond walking distances No Help Needed       Fall Risk  No flowsheet data found. Abuse Screen  Abuse Screening Questionnaire 5/14/2021   Do you ever feel afraid of your partner? N   Are you in a relationship with someone who physically or mentally threatens you? N   Is it safe for you to go home? Y       Cognitive Screening   Evaluation of Cognitive Function:  Has your family/caregiver stated any concerns about your memory: no    Physical Exam     Visit Vitals  BP (!) 146/86 (BP 1 Location: Left upper arm, BP Patient Position: Sitting, BP Cuff Size: Adult)   Pulse 73   Temp 96.8 °F (36 °C) (Temporal)   Resp 16   Ht 5' 9\" (1.753 m)   Wt 175 lb 3.2 oz (79.5 kg)   SpO2 96%   BMI 25.87 kg/m²     Body mass index is 25.87 kg/m². General appearance - alert, well appearing, and in no distress  Mental status - alert, oriented to person, place, and time  EYE-LIBBY, EOMI,conjunctiva normal bilaterally, lids normal  ENT-ENT exam normal, no neck nodes or sinus tenderness  Nose - normal and patent, no erythema,  Or discharge   Mouth - mucous membranes moist, pharynx normal without lesions  Neck - supple, no significant adenopathy or bruit  Chest - clear to auscultation, no wheezes, rales or rhonchi.   Heart - normal rate, regular rhythm, normal S1, S2, no murmurs, rubs, clicks or gallops   Abdomen - soft, nontender, nondistended, no masses or organomegaly  Lymph- no adenopathy palpable  Ext-peripheral pulses normal, no pedal edema, no clubbing or cyanosis  Skin-small pea-sized nodule felt left upper chest, soft and movable  Neuro -alert, oriented, normal speech, no focal findings or movement disorder noted    Assessment/Plan   [place] education and counseling provided:  Age appropriate evidence-based preventive care recommendations based on today's review and evaluation; including relevant cancer screening guidelines, and vaccination recommendations. An After Visit Summary was printed and given to the patient which information about these guidelines, and a personalized schedule for health maintenance items. Whe appropriate and with patient agreement, orders noted below were placed to complete missing health maintenance items. Additional Plan for follow up chronic medical conditions includes:  Diagnoses and all orders for this visit:    Initial Medicare annual wellness visit    Rheumatoid arthritis involving multiple sites, unspecified whether rheumatoid factor present ([facility]    Melanoma of thoracic region [facility])    Essential hypertension  -     COLLECTION VENOUS BLOOD,VENIPUNCTURE  -     CBC WITH AUTOMATED DIFF; Future  -     LIPID PANEL; Future  -     METABOLIC PANEL, COMPREHENSIVE; Future  -     TSH 3RD GENERATION; Future  -     URINALYSIS W/ REFLEX CULTURE; Future    Subcutaneous nodule  -     REFERRAL TO GENERAL SURGERY          Other instructions: The patient's medications were reviewed and reconciled. No change in her current medical regimen will be made. A no added salt, prudent diet is encouraged    We will refer to general surgery for excision of the subcutaneous nodule felt on her left upper chest.  Likely would have monitored this conservatively but due to history of melanoma would recommend excision.     Health maintenance issues were reviewed and she is up-to-date on Pap testing and mammography and I have asked for a copy of the mammogram report to be sent to us. She is overdue for colorectal cancer screening as it was postponed during the pandemic and she will arrange to have this done later this year. Shingles vaccine series has been recommended. Await results of multiple labs    Follow-up in 6 months    Follow-up and Dispositions    · Return in about 1 year (around 5/14/2022). I have reviewed with the patient details of the assessment and plan and all questions were answered. Relevent patient education was performed. The most recent lab findings were reviewed with the patient. Braulio Dumont MD    Please note that this dictation was completed with [employer], the computer voice recognition software. Quite often unanticipated grammatical, syntax, homophones, and other interpretive errors are inadvertently transcribed by the computer software. Please disregard these errors. Please excuse any errors that have escaped final proofreading.

## 2021-05-14 NOTE — PATIENT INSTRUCTIONS
DASH Diet: Care Instructions Your Care Instructions The DASH diet is an eating plan that can help lower your blood pressure. DASH stands for Dietary Approaches to Stop Hypertension. Hypertension is high blood pressure. The DASH diet focuses on eating foods that are high in calcium, potassium, and magnesium. These nutrients can lower blood pressure. The foods that are highest in these nutrients are fruits, vegetables, low-fat dairy products, nuts, seeds, and legumes. But taking calcium, potassium, and magnesium supplements instead of eating foods that are high in those nutrients does not have the same effect. The DASH diet also includes whole grains, fish, and poultry. The DASH diet is one of several lifestyle changes your doctor may recommend to lower your high blood pressure. Your doctor may also want you to decrease the amount of sodium in your diet. Lowering sodium while following the DASH diet can lower blood pressure even further than just the DASH diet alone. Follow-up care is a key part of your treatment and safety. Be sure to make and go to all appointments, and call your doctor if you are having problems. It's also a good idea to know your test results and keep a list of the medicines you take. How can you care for yourself at home? Following the DASH diet · Eat 4 to 5 servings of fruit each day. A serving is 1 medium-sized piece of fruit, ½ cup chopped or canned fruit, 1/4 cup dried fruit, or 4 ounces (½ cup) of fruit juice. Choose fruit more often than fruit juice. · Eat 4 to 5 servings of vegetables each day. A serving is 1 cup of lettuce or raw leafy vegetables, ½ cup of chopped or cooked vegetables, or 4 ounces (½ cup) of vegetable juice. Choose vegetables more often than vegetable juice. · Get 2 to 3 servings of low-fat and fat-free dairy each day. A serving is 8 ounces of milk, 1 cup of yogurt, or 1 ½ ounces of cheese. · Eat 6 to 8 servings of grains each day.  A serving is 1 slice of bread, 1 ounce of dry cereal, or ½ cup of cooked rice, pasta, or cooked cereal. Try to choose whole-grain products as much as possible. · Limit lean meat, poultry, and fish to 2 servings each day. A serving is 3 ounces, about the size of a deck of cards. · Eat 4 to 5 servings of nuts, seeds, and legumes (cooked dried beans, lentils, and split peas) each week. A serving is 1/3 cup of nuts, 2 tablespoons of seeds, or ½ cup of cooked beans or peas. · Limit fats and oils to 2 to 3 servings each day. A serving is 1 teaspoon of vegetable oil or 2 tablespoons of salad dressing. · Limit sweets and added sugars to 5 servings or less a week. A serving is 1 tablespoon jelly or jam, ½ cup sorbet, or 1 cup of lemonade. · Eat less than 2,300 milligrams (mg) of sodium a day. If you limit your sodium to 1,500 mg a day, you can lower your blood pressure even more. · Be aware that all of these are the suggested number of servings for people who eat 1,800 to 2,000 calories a day. Your recommended number of servings may be different if you need more or fewer calories. Tips for success · Start small. Do not try to make dramatic changes to your diet all at once. You might feel that you are missing out on your favorite foods and then be more likely to not follow the plan. Make small changes, and stick with them. Once those changes become habit, add a few more changes. · Try some of the following: ? Make it a goal to eat a fruit or vegetable at every meal and at snacks. This will make it easy to get the recommended amount of fruits and vegetables each day. ? Try yogurt topped with fruit and nuts for a snack or healthy dessert. ? Add lettuce, tomato, cucumber, and onion to sandwiches. ? Combine a ready-made pizza crust with low-fat mozzarella cheese and lots of vegetable toppings. Try using tomatoes, squash, spinach, broccoli, carrots, cauliflower, and onions. ?  Have a variety of cut-up vegetables with a low-fat dip as an appetizer instead of chips and dip. ? Sprinkle sunflower seeds or chopped almonds over salads. Or try adding chopped walnuts or almonds to cooked vegetables. ? Try some vegetarian meals using beans and peas. Add garbanzo or kidney beans to salads. Make burritos and tacos with mashed norwood beans or black beans. Where can you learn more? Go to http://www.gray.com/ Enter P492 in the search box to learn more about \"DASH Diet: Care Instructions. \" Current as of: August 31, 2020               Content Version: 12.8 © 1951-8385 [employer]. Care instructions adapted under license by [employer] (which disclaims liability or warranty for this information). If you have questions about a medical condition or this instruction, always ask your healthcare professional. [place] [address] any warranty or liability for your use of this information. The best way to stay healthy is to live a healthy lifestyle. A healthy lifestyle includes regular exercise, eating a well-balanced diet, keeping a healthy weight and not smoking. Regular physical exams and screening tests are another important way to take care of yourself. Preventive exams provided by health care providers can find health problems early when treatment works best and can keep you from getting certain diseases or illnesses. Preventive services include exams, lab tests, screenings, shots, monitoring and information to help you take care of your own health. All people over 65 should have a pneumonia shot. Pneumonia shots are usually only needed once in a lifetime unless your doctor decides differently. In addition to your physical exam, some screening tests are recommended: 
 
All people over 65 should have a yearly flu shot. People over 65 are at medium to high risk for Hepatitis B. Three shots are needed for complete protection.   
 
Bone mass measurement (dexa scan) is recommended every two years. Diabetes Mellitus screening is recommended every year. Glaucoma is an eye disease caused by high pressure in the eye. An eye exam is recommended every year. Cardiovascular screening tests that check your cholesterol and other blood fat (lipid) levels are recommended every five years. Colorectal Cancer screening tests help to find pre-cancerous polyps (growths in the colon) so they can be removed before they turn into cancer. Tests ordered for screening depend on your personal and family history risk factors. Prostate Cancer Screening (annually up to age 76) Screening for breast cancer is recommended yearly with a Mammogram. 
 
Screening for cervical and vaginal cancer is recommended with a pelvic and Pap test every two years. However if you have had an abnormal pap in the past  three years or at high risk for cervical or vaginal cancer Medicare will cover a pap test and a pelvic exam every year. Here is a list of your current Health Maintenance items with a due date: 
Health Maintenance Due Topic Date Due  
 Pap Test  Never done  Shingles Vaccine (1 of 2) Never done  Mammogram  05/05/2016  Colorectal Screening  04/30/2020  Bone Mineral Density   07/11/2021

## 2021-05-24 ENCOUNTER — OFFICE VISIT (OUTPATIENT)
Dept: SURGERY | Age: 66
End: 2021-05-24
Payer: MEDICARE

## 2021-05-24 VITALS
OXYGEN SATURATION: 96 % | DIASTOLIC BLOOD PRESSURE: 73 MMHG | BODY MASS INDEX: 25.65 KG/M2 | HEIGHT: 69 IN | SYSTOLIC BLOOD PRESSURE: 167 MMHG | HEART RATE: 62 BPM | WEIGHT: 173.2 LBS

## 2021-05-24 DIAGNOSIS — R22.2 SUBCUTANEOUS NODULE OF CHEST WALL: Primary | ICD-10-CM

## 2021-05-24 PROCEDURE — 99203 OFFICE O/P NEW LOW 30 MIN: CPT | Performed by: SURGERY

## 2021-05-24 PROCEDURE — G8427 DOCREV CUR MEDS BY ELIG CLIN: HCPCS | Performed by: SURGERY

## 2021-05-24 PROCEDURE — G8754 DIAS BP LESS 90: HCPCS | Performed by: SURGERY

## 2021-05-24 PROCEDURE — G8432 DEP SCR NOT DOC, RNG: HCPCS | Performed by: SURGERY

## 2021-05-24 PROCEDURE — G8419 CALC BMI OUT NRM PARAM NOF/U: HCPCS | Performed by: SURGERY

## 2021-05-24 PROCEDURE — 3017F COLORECTAL CA SCREEN DOC REV: CPT | Performed by: SURGERY

## 2021-05-24 PROCEDURE — G8753 SYS BP > OR = 140: HCPCS | Performed by: SURGERY

## 2021-05-24 NOTE — Clinical Note
5/24/2021 Patient: [place] YOB: 1955 Date of Visit: 5/24/2021 MD Abdiel [address] [address] [address] [place] Dear Estephanie Hackett MD, Thank you for referring Ms. Jarrett Castro to [facility] [address] for evaluation. My notes for this consultation are attached. If you have questions, please do not hesitate to call me. I look forward to following your patient along with you.  
 
 
Sincerely, 
 
Philly Ibarra MD

## 2021-05-24 NOTE — PROGRESS NOTES
To: Sharad Brooks MD    From: Prabha Turner MD    Thank you for sending Heide Mcgraw to see us. Please note that this dictation was completed with [employer], the computer voice recognition software. Quite often unanticipated grammatical, syntax, homophones, and other interpretive errors are inadvertently transcribed by the software. Please disregard these errors. Please excuse any errors that have escaped final proofreading. Encounter Date: 5/24/2021  History and Physical    Assessment:   Left upper chest 1 cm subcutaneous nodule. History of melanoma under the right breast and on the right back. There is no obvious skin lesion associated with the nodule. Comorbid hypertension, rheumatoid arthritis on Enbrel. No aspirin or anticoagulation. Plan:   I recommended excisional biopsy. She is okay with doing that here in the office. Risks including bleeding and infection were explained to the patient. The patient expressed understanding of the risks, and all questions were answered to the patient's satisfaction. HPI:   [place] is a 60 y.o. female who is seen in consultation at the request of Sharad Brooks MD for evaluation of a nodule under the skin near her left shoulder. She says that she first noticed it after her second Covid shot and it was initially a little bit tender. She says it does not hurt much anymore but it is still present. She has a history of melanoma x2. She first had a lesion excised from under her right breast in 2006 with a sentinel lymph node biopsy that was considered negative. She also had a melanoma excised from her back on the right side in 2016. She has not noticed any new skin lesions on her torso or anywhere else. She has seen Dr. Nneka Tucker her dermatologist about this and has also seen Dr. Debi Davis who referred her here.     Past Medical History:   Diagnosis Date    Abdominal pain     Chronic rheumatic arthritis ([address]     Hypertension     Melanoma of thoracic region [facility])      Past Surgical History:   Procedure Laterality Date    HX GYN       X2    HX MALIGNANT SKIN LESION EXCISION      melanoma x2    HX OTHER SURGICAL      lymph node removed from Right arm benign      Family History   Problem Relation Age of Onset    Breast Cancer Mother     Heart Disease Father     Dementia Father      Social History     Tobacco Use    Smoking status: Former Smoker     Packs/day: 1.00     Years: 1.00     Pack years: 1.00     Types: Cigarettes, Cigars    Smokeless tobacco: Former User     Quit date:     Tobacco comment: quit    Substance Use Topics    Alcohol use: No      Current Outpatient Medications   Medication Sig    valsartan (DIOVAN) 320 mg tablet TAKE 1 TABLET EVERY DAY    amLODIPine (NORVASC) 2.5 mg tablet TAKE 1 TABLET EVERY DAY    etanercept (ENBREL) 50 mg/mL (0.98 mL) injection 50 mg by SubCUTAneous route every seven (7) days. Indications: RHEUMATOID ARTHRITIS    sulfaSALAzine (AZULFIDINE) 500 mg tablet Take 500 mg by mouth two (2) times a day. Take two tablets twice daily   Indications: RHEUMATOID ARTHRITIS    hydroxychloroquine (PLAQUINIL) 200 mg tablet Take 200 mg by mouth two (2) times a day. Take one tablet twice daily     acetaminophen (TYLENOL) 500 mg tablet Take 1,000 mg by mouth every six (6) hours as needed. No current facility-administered medications for this visit. Allergies: Allergies   Allergen Reactions    Shellfish Derived Shortness of Breath    Levaquin [Levofloxacin] Rash    Codeine Rash    Pcn [Penicillins] Rash    Seafood [Shellfish Containing Products] Shortness of Breath       Review of Systems:  10 systems reviewed. See scanned sheet in \"Media\" section. See HPI for pertinent positives and negatives.       Objective:     Visit Vitals  BP (!) 167/73 (BP 1 Location: Left arm, BP Patient Position: Sitting)   Pulse 62   Ht 5' 9\" (1.753 m)   Wt 78.6 kg (173 lb 3.2 oz) SpO2 96%   BMI 25.58 kg/m²       Physical Exam:  General appearance  Alert, cooperative, no distress, appears stated age   [de-identified] Anicteric           Lungs   Clear to auscultation bilaterally   Heart  Regular rate and rhythm. Extremities no cyanosis or edema   Pulses 2+ right radial       Lymph nodes No palpable left axillary or left supraclavicular LAD. Neurologic Without overt sensory or motor deficit     Focused exam of the chest reveals a 5 to 10 mm subcutaneous nodule just below the acromioclavicular joint. On ultrasound this is a round lesion in the subcutaneous adipose layer. There is no pigmented lesion on the surrounding skin. .    Signed By: Val Hayes MD     May 24, 2021

## 2021-05-24 NOTE — PROGRESS NOTES
Identified pt with two pt identifiers(name and ). Reviewed record in preparation for visit and have obtained necessary documentation. All patient medications has been reviewed. Chief Complaint   Patient presents with   [address] Maintenance Due   Topic    PAP AKA CERVICAL CYTOLOGY     Shingrix Vaccine Age 49> (1 of 2)    Breast Cancer Screen Mammogram     Colorectal Cancer Screening Combo     Bone Densitometry (Dexa) Screening        Vitals:    21 0929   BP: (!) 180/83   Pulse: 62   SpO2: 96%   Weight: 78.6 kg (173 lb 3.2 oz)   Height: 5' 9\" (1.753 m)   PainSc:   0 - No pain       4. Have you been to the ER, urgent care clinic since your last visit? Hospitalized since your last visit? No    5. Have you seen or consulted any other health care providers outside of the [address] [place] [address] since your last visit? Include any pap smears or colon screening. No      Patient is accompanied by self I have received verbal consent from [place] to discuss any/all medical information while they are present in the room.

## 2021-05-25 ENCOUNTER — HOSPITAL ENCOUNTER (OUTPATIENT)
Dept: LAB | Age: 66
Discharge: HOME | End: 2021-05-25

## 2021-05-25 ENCOUNTER — OFFICE VISIT (OUTPATIENT)
Dept: SURGERY | Age: 66
End: 2021-05-25
Payer: MEDICARE

## 2021-05-25 VITALS
BODY MASS INDEX: 25.62 KG/M2 | RESPIRATION RATE: 16 BRPM | DIASTOLIC BLOOD PRESSURE: 82 MMHG | SYSTOLIC BLOOD PRESSURE: 175 MMHG | HEIGHT: 69 IN | OXYGEN SATURATION: 97 % | WEIGHT: 173 LBS | HEART RATE: 63 BPM | TEMPERATURE: 97.2 F

## 2021-05-25 DIAGNOSIS — R22.2 SUBCUTANEOUS NODULE OF CHEST WALL: Primary | ICD-10-CM

## 2021-05-25 PROCEDURE — 21555 EXC NECK LES SC < 3 CM: CPT | Performed by: SURGERY

## 2021-05-25 NOTE — Clinical Note
5/25/2021 Patient: [place] YOB: 1955 Date of Visit: 5/25/2021 Justina Castelan MD 
[address] [address] [address] [employer] Dear Justina Castelan MD, Thank you for referring Ms. Nelson Shen to [facility] for evaluation. My notes for this consultation are attached. If you have questions, please do not hesitate to call me. I look forward to following your patient along with you.  
 
 
Sincerely, 
 
Gely Donovan MD

## 2021-05-25 NOTE — PROGRESS NOTES
Identified pt with two pt identifiers(name and ). Reviewed record in preparation for visit and have obtained necessary documentation. All patient medications has been reviewed. Chief Complaint   Patient presents with    Follow-up     office surgery excisional biopsy        Health Maintenance Due   Topic    PAP AKA CERVICAL CYTOLOGY     Shingrix Vaccine Age 49> (1 of 2)    Breast Cancer Screen Mammogram     Colorectal Cancer Screening Combo     Bone Densitometry (Dexa) Screening        Vitals:    21 1350   BP: (!) 175/82   Pulse: 63   Resp: 16   Temp: 97.2 °F (36.2 °C)   SpO2: 97%   Weight: 78.5 kg (173 lb)   Height: 5' 9\" (1.753 m)   PainSc:   0 - No pain       4. Have you been to the ER, urgent care clinic since your last visit? Hospitalized since your last visit? No    5. Have you seen or consulted any other health care providers outside of the [address] [place] [address] since your last visit? Include any pap smears or colon screening. No      Patient is accompanied by self I have received verbal consent from [place] to discuss any/all medical information while they are present in the room.

## 2021-05-25 NOTE — PROGRESS NOTES
Excision Procedure Note    Procedure Date: 5/25/2021    Pre-operative diagnosis: Left upper chest subcutaneous nodule  Post-operative diagnosis: Same, solid  Procedure:  Excision of above    Specimen size: Approximately 6 mm     Time out at performed by me (see consent form):  * Patient was identified by name and date of birth   * Agreement on procedure being performed was verified  * Risks and Benefits explained to the patient  * Procedure site verified and marked as necessary  * Patient was positioned for comfort  * Consent was signed and verified    Anesthesia: Local    Procedure Details: The area was prepped and draped in the usual manner. Local anesthesia was infiltrated into the skin and soft tissues surrounding the lesion. An incision was made. This was taken down into subcutaneous tissues with blunt and sharp dissection and the lesion was identified at the level of Geetha's fascia. This was incised and the mass was enucleated. It was divided in half. One half was sent in formalin and the other half was sent fresh for lymphoma protocol. The operative field was hemostatic. The incision was closed with buried interrupted 3-0 Vicryl sutures. Dermabond was then applied. Estimated Blood Loss:  minimal    Disposition:  Procedure well tolerated. Post-procedure pain scale: 0/10.     Signed By: Tyler Duke MD

## 2021-06-02 ENCOUNTER — TELEPHONE (OUTPATIENT)
Dept: SURGERY | Age: 66
End: 2021-06-02

## 2021-06-07 ENCOUNTER — OFFICE VISIT (OUTPATIENT)
Dept: SURGERY | Age: 66
End: 2021-06-07
Payer: MEDICARE

## 2021-06-07 VITALS
SYSTOLIC BLOOD PRESSURE: 163 MMHG | RESPIRATION RATE: 16 BRPM | BODY MASS INDEX: 25.48 KG/M2 | HEART RATE: 74 BPM | WEIGHT: 172 LBS | HEIGHT: 69 IN | TEMPERATURE: 97.4 F | DIASTOLIC BLOOD PRESSURE: 76 MMHG | OXYGEN SATURATION: 98 %

## 2021-06-07 DIAGNOSIS — Z09 POSTOPERATIVE EXAMINATION: Primary | ICD-10-CM

## 2021-06-07 PROCEDURE — 99024 POSTOP FOLLOW-UP VISIT: CPT | Performed by: SURGERY

## 2021-06-07 NOTE — Clinical Note
6/7/2021 Patient: [place] YOB: 1955 Date of Visit: 6/7/2021 Ada Osullivan MD 
[address] [address] [address] [employer] Dear Ada Osullivan MD, Thank you for referring Ms. Mo Mary to [facility] for evaluation. My notes for this consultation are attached. If you have questions, please do not hesitate to call me. I look forward to following your patient along with you.  
 
 
Sincerely, 
 
Halina Benitez MD

## 2021-06-07 NOTE — PROGRESS NOTES
Identified pt with two pt identifiers(name and ). Reviewed record in preparation for visit and have obtained necessary documentation. All patient medications has been reviewed. Chief Complaint   Patient presents with    Follow-up     excision of Left upper chest subcutaneous nodule. 21       Health Maintenance Due   Topic    PAP AKA CERVICAL CYTOLOGY     Shingrix Vaccine Age 50> (1 of 2)    Breast Cancer Screen Mammogram     Colorectal Cancer Screening Combo     Bone Densitometry (Dexa) Screening        Vitals:    21 0913   BP: (!) 163/76   Pulse: 74   Resp: 16   Temp: 97.4 °F (36.3 °C)   SpO2: 98%   Weight: 78 kg (172 lb)   Height: 5' 9\" (1.753 m)   PainSc:   0 - No pain       4. Have you been to the ER, urgent care clinic since your last visit? Hospitalized since your last visit? No    5. Have you seen or consulted any other health care providers outside of the [address] [place] [address] since your last visit? Include any pap smears or colon screening. No      Patient is accompanied by self I have received verbal consent from [place] to discuss any/all medical information while they are present in the room.

## 2021-06-07 NOTE — PROGRESS NOTES
To: Hugo Yee MD    From: Grey Chapin MD    Thank you for referring Skip Logan. Encounter Date: 6/7/2021    Subjective:      Kinsey Vazquez is a 60 y.o. female presents for postop care. The patient has no complaints except that she got her pathology results sent to her by email through [address] and she thought that was inappropriate. Objective:     General:  alert, cooperative, no distress, appears stated age   Incision:  Well healed. Assessment:     S/p excision of left upper chest lymph node. Path showed reactive lymphoid hyperplasia -- negative for carcinoma, melanoma, or granuloma. Doing well postoperatively. Plan:     Follow-up only as needed. Told to call for any concerns.       Grey Chapin MD

## 2021-06-21 RX ORDER — AMLODIPINE BESYLATE 2.5 MG/1
TABLET ORAL
Qty: 90 TABLET | Refills: 0 | Status: SHIPPED | OUTPATIENT
Start: 2021-06-21 | End: 2021-10-19 | Stop reason: SDUPTHER

## 2021-06-30 RX ORDER — VALSARTAN 320 MG/1
TABLET ORAL
Qty: 90 TABLET | Refills: 0 | Status: SHIPPED | OUTPATIENT
Start: 2021-06-30 | End: 2021-10-19 | Stop reason: SDUPTHER

## 2021-09-27 ENCOUNTER — HOSPITAL ENCOUNTER (OUTPATIENT)
Dept: PREADMISSION TESTING | Age: 66
Discharge: HOME | End: 2021-09-27
Payer: MEDICARE

## 2021-09-27 PROCEDURE — U0003 INFECTIOUS AGENT DETECTION BY NUCLEIC ACID (DNA OR RNA); SEVERE ACUTE RESPIRATORY SYNDROME CORONAVIRUS 2 (SARS-COV-2) (CORONAVIRUS DISEASE [COVID-19]), AMPLIFIED PROBE TECHNIQUE, MAKING USE OF HIGH THROUGHPUT TECHNOLOGIES AS DESCRIBED BY CMS-2020-01-R: HCPCS

## 2021-09-28 LAB
SARS-COV-2, XPLCVT: NOT DETECTED
SOURCE, COVRS: NORMAL

## 2021-10-01 ENCOUNTER — ANESTHESIA EVENT (OUTPATIENT)
Dept: ENDOSCOPY | Age: 66
End: 2021-10-01
Payer: MEDICARE

## 2021-10-01 ENCOUNTER — HOSPITAL ENCOUNTER (OUTPATIENT)
Age: 66
Setting detail: OUTPATIENT SURGERY
Discharge: HOME | End: 2021-10-01
Attending: INTERNAL MEDICINE | Admitting: INTERNAL MEDICINE
Payer: MEDICARE

## 2021-10-01 ENCOUNTER — ANESTHESIA (OUTPATIENT)
Dept: ENDOSCOPY | Age: 66
End: 2021-10-01
Payer: MEDICARE

## 2021-10-01 VITALS
TEMPERATURE: 97.3 F | OXYGEN SATURATION: 99 % | SYSTOLIC BLOOD PRESSURE: 143 MMHG | HEART RATE: 62 BPM | RESPIRATION RATE: 17 BRPM | DIASTOLIC BLOOD PRESSURE: 58 MMHG

## 2021-10-01 PROCEDURE — 2709999900 HC NON-CHARGEABLE SUPPLY: Performed by: INTERNAL MEDICINE

## 2021-10-01 PROCEDURE — 74011250636 HC RX REV CODE- 250/636: Performed by: INTERNAL MEDICINE

## 2021-10-01 PROCEDURE — 74011000250 HC RX REV CODE- 250: Performed by: NURSE ANESTHETIST, CERTIFIED REGISTERED

## 2021-10-01 PROCEDURE — 76040000019: Performed by: INTERNAL MEDICINE

## 2021-10-01 PROCEDURE — 74011250636 HC RX REV CODE- 250/636: Performed by: NURSE ANESTHETIST, CERTIFIED REGISTERED

## 2021-10-01 PROCEDURE — 76060000031 HC ANESTHESIA FIRST 0.5 HR: Performed by: INTERNAL MEDICINE

## 2021-10-01 RX ORDER — DEXTROMETHORPHAN/PSEUDOEPHED 2.5-7.5/.8
1.2 DROPS ORAL
Status: DISCONTINUED | OUTPATIENT
Start: 2021-10-01 | End: 2021-10-01 | Stop reason: HOSPADM

## 2021-10-01 RX ORDER — FLUMAZENIL 0.1 MG/ML
0.2 INJECTION INTRAVENOUS
Status: DISCONTINUED | OUTPATIENT
Start: 2021-10-01 | End: 2021-10-01 | Stop reason: HOSPADM

## 2021-10-01 RX ORDER — LIDOCAINE HYDROCHLORIDE 20 MG/ML
INJECTION, SOLUTION EPIDURAL; INFILTRATION; INTRACAUDAL; PERINEURAL AS NEEDED
Status: DISCONTINUED | OUTPATIENT
Start: 2021-10-01 | End: 2021-10-01 | Stop reason: HOSPADM

## 2021-10-01 RX ORDER — SODIUM CHLORIDE 0.9 % (FLUSH) 0.9 %
5-40 SYRINGE (ML) INJECTION AS NEEDED
Status: DISCONTINUED | OUTPATIENT
Start: 2021-10-01 | End: 2021-10-01 | Stop reason: HOSPADM

## 2021-10-01 RX ORDER — SODIUM CHLORIDE 0.9 % (FLUSH) 0.9 %
5-40 SYRINGE (ML) INJECTION EVERY 8 HOURS
Status: DISCONTINUED | OUTPATIENT
Start: 2021-10-01 | End: 2021-10-01 | Stop reason: HOSPADM

## 2021-10-01 RX ORDER — ATROPINE SULFATE 0.1 MG/ML
0.5 INJECTION INTRAVENOUS
Status: DISCONTINUED | OUTPATIENT
Start: 2021-10-01 | End: 2021-10-01 | Stop reason: HOSPADM

## 2021-10-01 RX ORDER — SODIUM CHLORIDE 9 MG/ML
100 INJECTION, SOLUTION INTRAVENOUS CONTINUOUS
Status: DISCONTINUED | OUTPATIENT
Start: 2021-10-01 | End: 2021-10-01 | Stop reason: HOSPADM

## 2021-10-01 RX ORDER — NALOXONE HYDROCHLORIDE 0.4 MG/ML
0.4 INJECTION, SOLUTION INTRAMUSCULAR; INTRAVENOUS; SUBCUTANEOUS
Status: DISCONTINUED | OUTPATIENT
Start: 2021-10-01 | End: 2021-10-01 | Stop reason: HOSPADM

## 2021-10-01 RX ORDER — EPINEPHRINE 0.1 MG/ML
1 INJECTION INTRACARDIAC; INTRAVENOUS
Status: DISCONTINUED | OUTPATIENT
Start: 2021-10-01 | End: 2021-10-01 | Stop reason: HOSPADM

## 2021-10-01 RX ORDER — PROPOFOL 10 MG/ML
INJECTION, EMULSION INTRAVENOUS AS NEEDED
Status: DISCONTINUED | OUTPATIENT
Start: 2021-10-01 | End: 2021-10-01 | Stop reason: HOSPADM

## 2021-10-01 RX ADMIN — PROPOFOL 30 MG: 10 INJECTION, EMULSION INTRAVENOUS at 08:28

## 2021-10-01 RX ADMIN — PROPOFOL 50 MG: 10 INJECTION, EMULSION INTRAVENOUS at 08:17

## 2021-10-01 RX ADMIN — LIDOCAINE HYDROCHLORIDE 40 MG: 20 INJECTION, SOLUTION EPIDURAL; INFILTRATION; INTRACAUDAL; PERINEURAL at 08:10

## 2021-10-01 RX ADMIN — PROPOFOL 80 MG: 10 INJECTION, EMULSION INTRAVENOUS at 08:10

## 2021-10-01 RX ADMIN — PROPOFOL 30 MG: 10 INJECTION, EMULSION INTRAVENOUS at 08:25

## 2021-10-01 RX ADMIN — PROPOFOL 50 MG: 10 INJECTION, EMULSION INTRAVENOUS at 08:22

## 2021-10-01 RX ADMIN — SODIUM CHLORIDE 100 ML/HR: 9 INJECTION, SOLUTION INTRAVENOUS at 07:57

## 2021-10-01 RX ADMIN — PROPOFOL 50 MG: 10 INJECTION, EMULSION INTRAVENOUS at 08:13

## 2021-10-01 RX ADMIN — PROPOFOL 20 MG: 10 INJECTION, EMULSION INTRAVENOUS at 08:14

## 2021-10-01 RX ADMIN — PROPOFOL 40 MG: 10 INJECTION, EMULSION INTRAVENOUS at 08:19

## 2021-10-01 NOTE — ANESTHESIA POSTPROCEDURE EVALUATION
Procedure(s):  COLONOSCOPY. MAC    Anesthesia Post Evaluation        Patient location during evaluation: PACU  Note status: Adequate. Level of consciousness: responsive to verbal stimuli and sleepy but conscious  Pain management: satisfactory to patient  Airway patency: patent  Anesthetic complications: no  Cardiovascular status: acceptable  Respiratory status: acceptable  Hydration status: acceptable  Comments: +Post-Anesthesia Evaluation and Assessment    Patient: Ashley Haq MRN: [id number]  SSN: [id number]   YOB: 1955  Age: 73 y.o. Sex: female      Cardiovascular Function/Vital Signs    BP (!) 149/78   Pulse 71   Temp 36.3 °C (97.3 °F)   Resp 14   SpO2 100%   Breastfeeding No     Patient is status post Procedure(s):  COLONOSCOPY. Nausea/Vomiting: Controlled. Postoperative hydration reviewed and adequate. Pain:  Pain Scale 1: Numeric (0 - 10) (10/01/21 0842)  Pain Intensity 1: 0 (10/01/21 5310)   Managed. Neurological Status: At baseline. Mental Status and Level of Consciousness: Arousable. Pulmonary Status:   O2 Device: None (Room air) (10/01/21 0842)   Adequate oxygenation and airway patent. Complications related to anesthesia: None    Post-anesthesia assessment completed. No concerns. Signed By: Nadia Martinez MD    10/1/2021  Post anesthesia nausea and vomiting:  controlled      INITIAL Post-op Vital signs:   Vitals Value Taken Time   /58 10/01/21 0845   Temp 36.3 °C (97.3 °F) 10/01/21 0840   Pulse 62 10/01/21 0845   Resp 17 10/01/21 0845   SpO2 99 % 10/01/21 0845   Vitals shown include unvalidated device data.

## 2021-10-01 NOTE — PROCEDURES
[facility]                  Colonoscopy Operative Report    10/1/2021      Dai Winchester  [id number]  1955    Procedure Type:   Colonoscopy --screening     Indications:    Family history of coloretal adenoma  (screening only), Personal history of colon polyps (screening only)     Pre-operative Diagnosis: see indication above    Post-operative Diagnosis:  See findings below    :  Roselyn Jiang MD    Referring Provider: Simona Gould MD      Sedation:  MAC anesthesia Propofol    Pre-Procedural Exam:      Airway: clear,  No airway problems anticipated  Heart: RRR, without gallops or rubs  Lungs: clear bilaterally without wheezes, crackles, or rhonchi  Abdomen: soft, nontender, nondistended, bowel sounds present  Mental Status: awake, alert and oriented to person, place and time     Procedure Details:  After informed consent was obtained with all risks and benefits of procedure explained and preoperative exam completed, the patient was taken to the endoscopy suite and placed in the left lateral decubitus position. Upon sequential sedation as per above, a digital rectal exam was performed . The Olympus videocolonoscope  was inserted in the rectum and carefully advanced to the cecum, which was identified by the ileocecal valve and appendiceal orifice. The cecum was identified by the ileocecal valve and appendiceal orifice. The quality of preparation was good. The colonoscope was slowly withdrawn with careful evaluation between folds. Retroflexion in the rectum was completed demonstrating internal hemorrhoids. Findings:   Rectum: Grade 1 internal hemorrhoid(s); Sigmoid:     - Diverticulosis, mild  Descending Colon: normal  Transverse Colon: normal  Ascending Colon: normal  Cecum: normal  Terminal Ileum: not intubated      Specimen Removed:  none    Complications: None. EBL:  None.     Impression:    normal colonic mucosa throughout  diverticulosis,  Mild in degree, involving the sigmoid  hemorrhoids internal, Moderate in size    Recommendations: --Repeat colonoscopy in 5 years. High fiber diet. Resume normal medication(s). Discharge Disposition:  Home in the company of a  when able to ambulate. Maximo Ang MD    10/1/2021     LAUREN Benavides MD  [facility], [facility] [address] [place] [address]  [phone]  [email or web address]

## 2021-10-01 NOTE — ANESTHESIA PREPROCEDURE EVALUATION
Relevant Problems   CARDIOVASCULAR   (+) HTN (hypertension)      ENDOCRINE   (+) Rheumatoid arthritis (HCC)       Anesthetic History   No history of anesthetic complications            Review of Systems / Medical History  Patient summary reviewed, nursing notes reviewed and pertinent labs reviewed    Pulmonary  Within defined limits                 Neuro/Psych   Within defined limits           Cardiovascular    Hypertension              Exercise tolerance: >4 METS     GI/Hepatic/Renal  Within defined limits              Endo/Other        Arthritis     Other Findings              Physical Exam    Airway  Mallampati: II  TM Distance: 4 - 6 cm  Neck ROM: normal range of motion   Mouth opening: Normal     Cardiovascular  Regular rate and rhythm,  S1 and S2 normal,  no murmur, click, rub, or gallop             Dental  No notable dental hx       Pulmonary  Breath sounds clear to auscultation               Abdominal  GI exam deferred       Other Findings            Anesthetic Plan    ASA: 2  Anesthesia type: MAC          Induction: Intravenous  Anesthetic plan and risks discussed with: Patient

## 2021-10-01 NOTE — PROGRESS NOTES
Endoscope was pre-cleaned at bedside immediately following procedure by Abe Patton. See Anesthesia report. Received report from anesthesia staff on vital signs and status of patient.

## 2021-10-01 NOTE — DISCHARGE INSTRUCTIONS
Lorri Marvin MD  [facility], [address]  [place], [address] [facility]  [phone]  [email or web address]    [place]  [id number]  1955    COLON DISCHARGE INSTRUCTIONS  Discomfort:  Redness at IV site- apply warm compress to area; if redness or soreness persist- contact your physician  There may be a slight amount of blood passed from the rectum  Gaseous discomfort- walking, belching will help relieve any discomfort  You may not operate a vehicle for 12 hours  You may not engage in an occupation involving machinery or appliances for rest of today  You may not drink alcoholic beverages for at least 12 hours  Avoid making any critical decisions for at least 24 hour  DIET:   High fiber diet. - however -  remember your colon is empty and a heavy meal will produce gas. Avoid these foods:  vegetables, fried / greasy foods, carbonated drinks for today      ACTIVITY:  You may resume your normal daily activities it is recommended that you spend the remainder of the day resting -  avoid any strenuous activity. CALL M.D. ANY SIGN OF:   Increasing pain, nausea, vomiting  Abdominal distension (swelling)  New increased bleeding (oral or rectal)  Fever (chills)  Pain in chest area  Bloody discharge from nose or mouth  Shortness of breath     COLONOSCOPY FINDINGS:  Your colonoscopy showed: NO polyps found. Do have some internal hemorrhoids and mild diverticulosis. Follow-up Instructions:   Call Dr. Lorri Marvin if any questions or problems. Telephone # [phone]    Should have a repeat colonoscopy in 5 years.

## 2021-10-01 NOTE — H&P
Tereza Mcbride MD  [facility], [address]  [phone]  [email or web address]    Gastroenterology Outpatient History and Physical    Patient: Deny Otero    Physician: Magdalena Moody MD    Vital Signs: Pulse 70, temperature 97.9 °F (36.6 °C), resp. rate 16, not currently breastfeeding. Allergies: Allergies   Allergen Reactions    Shellfish Derived Shortness of Breath    Levaquin [Levofloxacin] Rash    Codeine Rash    Pcn [Penicillins] Rash    Seafood [Shellfish Containing Products] Shortness of Breath       Chief Complaint: Hx of polyps    History of Present Illness: Personal history of colonic polyps. Last colonoscopy was  and showed no polyps. Currently has no GI symptoms. No FH of colon cancer, but positive for colonic polyps.       History:  Past Medical History:   Diagnosis Date    Abdominal pain     Chronic rheumatic arthritis ([facility] [address]     Hypertension     Melanoma of thoracic region [facility])       Past Surgical History:   Procedure Laterality Date    HX GYN       X2    HX MALIGNANT SKIN LESION EXCISION      melanoma x2    HX OTHER SURGICAL      lymph node removed from Right arm benign    HX OTHER SURGICAL  2021    Left upper chest subcutaneous nodule excision      Social History     Socioeconomic History    Marital status:      Spouse name: Not on file    Number of children: 2    Years of education: Not on file    Highest education level: Not on file   Tobacco Use    Smoking status: Former Smoker     Packs/day: 1.00     Years: 1.00     Pack years: 1.00     Types: Cigarettes, Cigars    Smokeless tobacco: Former User     Quit date:     Tobacco comment: quit    Vaping Use    Vaping Use: Never used   Substance and Sexual Activity    Alcohol use: No    Drug use: Not Currently     Social Determinants of Health     Financial Resource Strain:    [place]um Difficulty of Paying Living Expenses:    Food Insecurity:     Worried About Running Out of Food in the Last Year:     [address] in the Last Year:    Transportation Needs:     Lack of Transportation (Medical):  Lack of Transportation (Non-Medical):    Physical Activity:     Days of Exercise per Week:     Minutes of Exercise per Session:    Stress:     Feeling of Stress :    Social Connections:     Frequency of Communication with Friends and Family:     Frequency of Social Gatherings with Friends and Family:     Attends Congregation Services:     Active Member of Clubs or Organizations:     Attends Club or Organization Meetings:     Marital Status:       Family History   Problem Relation Age of Onset    Breast Cancer Mother     Heart Disease Father     Dementia Father       Patient Active Problem List   Diagnosis Code    Rheumatoid arthritis ([facility] [address] M06.9    Abdominal pain R10.9    Elevated liver function tests R79.89    Sepsis, unspecified A41.9    History of melanoma Z85.820    HTN (hypertension) I10    Adverse drug reaction T50.905A       Medications:   Prior to Admission medications    Medication Sig Start Date End Date Taking? Authorizing Provider   valsartan (DIOVAN) 320 mg tablet TAKE 1 TABLET EVERY DAY 6/30/21  Yes Lea Jaime MD   amLODIPine (NORVASC) 2.5 mg tablet TAKE 1 TABLET EVERY DAY 6/21/21  Yes Ayse Hernandez MD   etanercept (ENBREL) 50 mg/mL (0.98 mL) injection 50 mg by SubCUTAneous route every seven (7) days. Indications: RHEUMATOID ARTHRITIS   Yes Siva Bergman MD   sulfaSALAzine (AZULFIDINE) 500 mg tablet Take 500 mg by mouth two (2) times a day. Take two tablets twice daily   Indications: RHEUMATOID ARTHRITIS   Yes Siva Bergman MD   hydroxychloroquine (PLAQUINIL) 200 mg tablet Take 200 mg by mouth two (2) times a day.  Take one tablet twice daily    Yes Siva Bergman MD   acetaminophen (TYLENOL) 500 mg tablet Take 1,000 mg by mouth every six (6) hours as needed. Yes Other, MD Siva       Physical Exam:     General: well developed, well nourished   HEENT: unremarkable   Heart: regular rhythm no mumur    Lungs: clear   Abdominal:  benign   Neurological: unremarkable   Extremities: no edema     Findings/Diagnosis: Personal and family history of colonic polyps.    Plan of Care/Planned Procedure: Colonoscopy with monitored anesthesia care sedation    Signed:  Lucia Olivarez MD 10/1/2021

## 2021-10-01 NOTE — ROUTINE PROCESS
[place]  1955  [id number]    Situation:  Verbal report received from: [facility]  Procedure: Procedure(s):  COLONOSCOPY    Background:    Preoperative diagnosis: FAMILY HX OF POLYPS, PERSONAL HX OF POLYPS  Postoperative diagnosis: diverticulosis, hemorrhoids    :  Dr. Aranda Single  Assistant(s): Endoscopy Technician-1: Michelle Reyes  Endoscopy RN-1: Nicholas Shrestha RN    Specimens: * No specimens in log *  H. Pylori  no    Assessment:  Intra-procedure medications     Anesthesia gave intra-procedure sedation and medications, see anesthesia flow sheet yes    Intravenous fluids: NS@ KVO     Vital signs stable yes    Abdominal assessment: round and soft yes    Recommendation:

## 2021-10-19 RX ORDER — VALSARTAN 320 MG/1
320 TABLET ORAL DAILY
Qty: 90 TABLET | Refills: 1 | Status: SHIPPED | OUTPATIENT
Start: 2021-10-19 | End: 2022-04-22 | Stop reason: SDUPTHER

## 2021-10-19 RX ORDER — AMLODIPINE BESYLATE 2.5 MG/1
2.5 TABLET ORAL DAILY
Qty: 90 TABLET | Refills: 1 | Status: SHIPPED | OUTPATIENT
Start: 2021-10-19 | End: 2022-04-22 | Stop reason: SDUPTHER

## 2021-10-19 NOTE — TELEPHONE ENCOUNTER
Last Refill: 6-30-21  Last Visit: 5/14/2021   Next Visit: 10/19/2021     Requested Prescriptions     Pending Prescriptions Disp Refills    valsartan (DIOVAN) 320 mg tablet 90 Tablet 1     Sig: Take 1 Tablet by mouth daily.

## 2021-10-19 NOTE — TELEPHONE ENCOUNTER
Last Refill: 6-21-21  Last Visit: 5/14/2021   Next Visit: Visit date not found     Requested Prescriptions     Pending Prescriptions Disp Refills    amLODIPine (NORVASC) 2.5 mg tablet 90 Tablet 1     Sig: Take 1 Tablet by mouth daily.

## 2021-11-18 ENCOUNTER — OFFICE VISIT (OUTPATIENT)
Dept: INTERNAL MEDICINE CLINIC | Age: 66
End: 2021-11-18
Payer: MEDICARE

## 2021-11-18 VITALS
SYSTOLIC BLOOD PRESSURE: 136 MMHG | DIASTOLIC BLOOD PRESSURE: 84 MMHG | HEIGHT: 69 IN | TEMPERATURE: 98.1 F | HEART RATE: 83 BPM | WEIGHT: 172 LBS | OXYGEN SATURATION: 98 % | BODY MASS INDEX: 25.48 KG/M2 | RESPIRATION RATE: 20 BRPM

## 2021-11-18 DIAGNOSIS — Z85.820 HISTORY OF MELANOMA: ICD-10-CM

## 2021-11-18 DIAGNOSIS — M06.9 RHEUMATOID ARTHRITIS INVOLVING MULTIPLE SITES, UNSPECIFIED WHETHER RHEUMATOID FACTOR PRESENT (HCC): ICD-10-CM

## 2021-11-18 DIAGNOSIS — I10 PRIMARY HYPERTENSION: Primary | ICD-10-CM

## 2021-11-18 PROCEDURE — G8427 DOCREV CUR MEDS BY ELIG CLIN: HCPCS | Performed by: INTERNAL MEDICINE

## 2021-11-18 PROCEDURE — 3017F COLORECTAL CA SCREEN DOC REV: CPT | Performed by: INTERNAL MEDICINE

## 2021-11-18 PROCEDURE — G8752 SYS BP LESS 140: HCPCS | Performed by: INTERNAL MEDICINE

## 2021-11-18 PROCEDURE — 99213 OFFICE O/P EST LOW 20 MIN: CPT | Performed by: INTERNAL MEDICINE

## 2021-11-18 PROCEDURE — G8510 SCR DEP NEG, NO PLAN REQD: HCPCS | Performed by: INTERNAL MEDICINE

## 2021-11-18 PROCEDURE — 1090F PRES/ABSN URINE INCON ASSESS: CPT | Performed by: INTERNAL MEDICINE

## 2021-11-18 PROCEDURE — G8536 NO DOC ELDER MAL SCRN: HCPCS | Performed by: INTERNAL MEDICINE

## 2021-11-18 PROCEDURE — 1101F PT FALLS ASSESS-DOCD LE1/YR: CPT | Performed by: INTERNAL MEDICINE

## 2021-11-18 PROCEDURE — G8400 PT W/DXA NO RESULTS DOC: HCPCS | Performed by: INTERNAL MEDICINE

## 2021-11-18 PROCEDURE — G8419 CALC BMI OUT NRM PARAM NOF/U: HCPCS | Performed by: INTERNAL MEDICINE

## 2021-11-18 PROCEDURE — G8754 DIAS BP LESS 90: HCPCS | Performed by: INTERNAL MEDICINE

## 2021-11-18 NOTE — PROGRESS NOTES
Subjective:     Mrs. Lary Moncada presents to the office today in follow-up of multiple medical problems. The patient has hypertension currently managed on valsartan and amlodipine. Tolerates this without orthostatic dizziness, lower extremity edema, cough. Has had no headaches, numbness, tingling or focal neurological problems. Remains on Enbrel and Azulfidine and hydroxychloroquine for her rheumatoid arthritis currently managed by Dr. Thompson at [facility]. She has been having back pain and does have an appointment to be seen by her rheumatologist next month. She has taken Tylenol for discomfort. The patient has a history of recurrent melanoma and is followed by Dr. Dane James for dermatology and recently had an exam which revealed no reoccurrence of this problem. Past Medical History:   Diagnosis Date    Abdominal pain     Chronic rheumatic arthritis (HCC)     Hypertension     Melanoma of thoracic region [facility])      Past Surgical History:   Procedure Laterality Date    COLONOSCOPY N/A 10/1/2021    COLONOSCOPY performed by Jennifer Haji MD at [facility]; HI RISK IND  10/1/2021         HX GYN       X2    HX MALIGNANT SKIN LESION EXCISION      melanoma x2    HX OTHER SURGICAL      lymph node removed from Right arm benign    HX OTHER SURGICAL  2021    Left upper chest subcutaneous nodule excision     Allergies   Allergen Reactions    Shellfish Derived Shortness of Breath    Levaquin [Levofloxacin] Rash    Codeine Rash    Pcn [Penicillins] Rash    Seafood [Shellfish Containing Products] Shortness of Breath     Current Outpatient Medications   Medication Sig Dispense Refill    valsartan (DIOVAN) 320 mg tablet Take 1 Tablet by mouth daily. 90 Tablet 1    amLODIPine (NORVASC) 2.5 mg tablet Take 1 Tablet by mouth daily. 90 Tablet 1    etanercept (ENBREL) 50 mg/mL (0.98 mL) injection 50 mg by SubCUTAneous route every seven (7) days.  Indications: RHEUMATOID ARTHRITIS      sulfaSALAzine (AZULFIDINE) 500 mg tablet Take 500 mg by mouth two (2) times a day. Take two tablets twice daily   Indications: RHEUMATOID ARTHRITIS      hydroxychloroquine (PLAQUINIL) 200 mg tablet Take 200 mg by mouth two (2) times a day. Take one tablet twice daily       acetaminophen (TYLENOL) 500 mg tablet Take 1,000 mg by mouth every six (6) hours as needed. Social History     Socioeconomic History    Marital status:     Number of children: 2   Tobacco Use    Smoking status: Former Smoker     Packs/day: 1.00     Years: 1.00     Pack years: 1.00     Types: Cigarettes, Cigars    Smokeless tobacco: Former User     Quit date: 1974    Tobacco comment: quit 1974   Vaping Use    Vaping Use: Never used   Substance and Sexual Activity    Alcohol use: No    Drug use: Not Currently     Family History   Problem Relation Age of Onset   [de-identified] Breast Cancer Mother     Heart Disease Father     Dementia Father        Review of Systems:  GEN: no weight loss, weight gain, fatigue or night sweats  CV: no PND, orthopnea, or palpitations  Resp: no dyspnea on exertion, no cough  Abd: no nausea, vomiting or diarrhea  EXT: denies edema, claudication  Endocrine: no hair loss, excessive thirst or polyuria  Neurological ROS: no TIA or stroke symptoms  ROS otherwise negative      Objective:     Visit Vitals  /84 (BP 1 Location: Left upper arm, BP Patient Position: Sitting, BP Cuff Size: Adult)   Pulse 83   Temp 98.1 °F (36.7 °C) (Oral)   Resp 20   Ht 5' 9\" (1.753 m)   Wt 172 lb (78 kg)   SpO2 98%   BMI 25.40 kg/m²     Body mass index is 25.4 kg/m². General:   alert, cooperative and no distress   Eyes: conjunctivae/sclerae clear.  PERRL, EOM's intact   Mouth:  No oral lesions, no pharyngeal erythema, no exudates   Neck: Trachea midline, no thyromegaly, no bruits   Heart: S1 and S2 normal,no murmurs noted    Lungs: Clear to auscultation bilaterally, no increased work of breathing Abdomen: Soft, nontender. Normal bowel sounds   Extremities: No edema or cyanosis   Neuro: ..alert, oriented x3,speech normal in context and clarity, cranial nerves II-XII intact,motor strength: full proximally and distally,gait: normal  reflexes: full and symmetric     Physical exam otherwise negative         Assessment/Plan:     Diagnoses and all orders for this visit:    Primary hypertension    Rheumatoid arthritis involving multiple sites, unspecified whether rheumatoid factor present ([facility] [address]    History of melanoma        Other instructions: The patient's medications were reviewed and reconciled. No change in her current medical regimen will be made. A no added salt, prudent diet is encouraged    Continued follow-up by otology and dermatology    Labs from 5/14 were reviewed with the patient today    Have recommended a Pneumovax 23 vaccination as she last had 1 of these in 2011 and have also recommended shingles vaccine series. Follow-up in 6 months    Follow-up and Dispositions    · Return in about 6 months (around 5/18/2022). Alaina Ramirez MD    Please note that this dictation was completed with [employer], the computer voice recognition software. Quite often unanticipated grammatical, syntax, homophones, and other interpretive errors are inadvertently transcribed by the computer software. Please disregard these errors. Please excuse any errors that have escaped final proofreading.

## 2021-11-18 NOTE — PATIENT INSTRUCTIONS
DASH Diet: Care Instructions  Your Care Instructions     The DASH diet is an eating plan that can help lower your blood pressure. DASH stands for Dietary Approaches to Stop Hypertension. Hypertension is high blood pressure. The DASH diet focuses on eating foods that are high in calcium, potassium, and magnesium. These nutrients can lower blood pressure. The foods that are highest in these nutrients are fruits, vegetables, low-fat dairy products, nuts, seeds, and legumes. But taking calcium, potassium, and magnesium supplements instead of eating foods that are high in those nutrients does not have the same effect. The DASH diet also includes whole grains, fish, and poultry. The DASH diet is one of several lifestyle changes your doctor may recommend to lower your high blood pressure. Your doctor may also want you to decrease the amount of sodium in your diet. Lowering sodium while following the DASH diet can lower blood pressure even further than just the DASH diet alone. Follow-up care is a key part of your treatment and safety. Be sure to make and go to all appointments, and call your doctor if you are having problems. It's also a good idea to know your test results and keep a list of the medicines you take. How can you care for yourself at home? Following the DASH diet  · Eat 4 to 5 servings of fruit each day. A serving is 1 medium-sized piece of fruit, ½ cup chopped or canned fruit, 1/4 cup dried fruit, or 4 ounces (½ cup) of fruit juice. Choose fruit more often than fruit juice. · Eat 4 to 5 servings of vegetables each day. A serving is 1 cup of lettuce or raw leafy vegetables, ½ cup of chopped or cooked vegetables, or 4 ounces (½ cup) of vegetable juice. Choose vegetables more often than vegetable juice. · Get 2 to 3 servings of low-fat and fat-free dairy each day. A serving is 8 ounces of milk, 1 cup of yogurt, or 1 ½ ounces of cheese. · Eat 6 to 8 servings of grains each day.  A serving is 1 slice of bread, 1 ounce of dry cereal, or ½ cup of cooked rice, pasta, or cooked cereal. Try to choose whole-grain products as much as possible. · Limit lean meat, poultry, and fish to 2 servings each day. A serving is 3 ounces, about the size of a deck of cards. · Eat 4 to 5 servings of nuts, seeds, and legumes (cooked dried beans, lentils, and split peas) each week. A serving is 1/3 cup of nuts, 2 tablespoons of seeds, or ½ cup of cooked beans or peas. · Limit fats and oils to 2 to 3 servings each day. A serving is 1 teaspoon of vegetable oil or 2 tablespoons of salad dressing. · Limit sweets and added sugars to 5 servings or less a week. A serving is 1 tablespoon jelly or jam, ½ cup sorbet, or 1 cup of lemonade. · Eat less than 2,300 milligrams (mg) of sodium a day. If you limit your sodium to 1,500 mg a day, you can lower your blood pressure even more. · Be aware that all of these are the suggested number of servings for people who eat 1,800 to 2,000 calories a day. Your recommended number of servings may be different if you need more or fewer calories. Tips for success  · Start small. Do not try to make dramatic changes to your diet all at once. You might feel that you are missing out on your favorite foods and then be more likely to not follow the plan. Make small changes, and stick with them. Once those changes become habit, add a few more changes. · Try some of the following:  ? Make it a goal to eat a fruit or vegetable at every meal and at snacks. This will make it easy to get the recommended amount of fruits and vegetables each day. ? Try yogurt topped with fruit and nuts for a snack or healthy dessert. ? Add lettuce, tomato, cucumber, and onion to sandwiches. ? Combine a ready-made pizza crust with low-fat mozzarella cheese and lots of vegetable toppings. Try using tomatoes, squash, spinach, broccoli, carrots, cauliflower, and onions. ?  Have a variety of cut-up vegetables with a low-fat dip as an appetizer instead of chips and dip. ? Sprinkle sunflower seeds or chopped almonds over salads. Or try adding chopped walnuts or almonds to cooked vegetables. ? Try some vegetarian meals using beans and peas. Add garbanzo or kidney beans to salads. Make burritos and tacos with mashed norwood beans or black beans. Where can you learn more? Go to http://www.chopra.com/  Enter H967 in the search box to learn more about \"DASH Diet: Care Instructions. \"  Current as of: April 29, 2021               Content Version: 13.0  © 9451-9511 [employer]. Care instructions adapted under license by [employer] (which disclaims liability or warranty for this information). If you have questions about a medical condition or this instruction, always ask your healthcare professional. [address] any warranty or liability for your use of this information.

## 2022-04-22 RX ORDER — VALSARTAN 320 MG/1
320 TABLET ORAL DAILY
Qty: 90 TABLET | Refills: 1 | Status: SHIPPED | OUTPATIENT
Start: 2022-04-22 | End: 2022-10-18

## 2022-04-22 RX ORDER — AMLODIPINE BESYLATE 2.5 MG/1
2.5 TABLET ORAL DAILY
Qty: 90 TABLET | Refills: 1 | Status: SHIPPED | OUTPATIENT
Start: 2022-04-22 | End: 2022-10-18

## 2022-04-22 NOTE — TELEPHONE ENCOUNTER
Requested Prescriptions     Pending Prescriptions Disp Refills    valsartan (DIOVAN) 320 mg tablet 90 Tablet 1     Sig: Take 1 Tablet by mouth daily.  amLODIPine (NORVASC) 2.5 mg tablet 90 Tablet 1     Sig: Take 1 Tablet by mouth daily.        Last Refill: 10/19/21  Next Appointment:5/27/22

## 2022-05-25 PROBLEM — C43.59 MELANOMA OF THORACIC REGION (HCC): Status: ACTIVE | Noted: 2022-05-25

## 2022-05-25 NOTE — PROGRESS NOTES
Subjective:     Chief Complaint   Patient presents with   Mo Oconnor is a 73 y.o. F.  Her previous primary care provider was Dr. Kenna Rosas, with whom her last visit was in November 2021. She has a history of hypertension, managed with valsartan hand and amlodipine, and continues on Enbrel, sulfasalazine, and hydroxychloroquine, rheumatoid arthritis managed by Dr. Azam Humphrey at [address] [place] [address]. She was also noted to have a history of recurrent melanoma, being followed by Dr. Stefano Sharpe, her dermatologist.  Her physical examination in November was generally unremarkable. She was advised to continue with routine laboratory studies and her usual medication regimen. Pneumovax 23 vaccination had been recommended. Today, the patient comes in for routine follow-up, establishment, and a Medicare wellness visit. She reports feeling fine overall today except for some chronic lower back pain, which the patient has had for the past several months. She describes this as a dull ache in her lower back without radiation, and she blames this on her knee pain, which has caused her to alter her mechanics of walking. She says that she will be having surgery on the right knee in a few more months. She denies any urinary incontinence, groin anesthesia, or recent significant change in her back pain or any radiation of the pain down her legs. She does have a prior history of melanoma. She denies any fevers or chills. Other than this, she has been doing about the same as usual.  Her rheumatoid arthritis symptoms are felt to be well controlled with a combination of sulfasalazine, hydroxychloroquine, and Enbrel. She denies any recent synovitis, or any significant change in her joint symptoms. She does use guardrails around her toilet and bathtub to help her secondary to her chronic joint pain, but otherwise does not require safety equipment or prosthetic aids while walking.   She continues to follow-up closely with her rheumatologist, and says that she just got her eye examination earlier in March of this year given her history of Plaquenil use. She is noted to have a history of prior melanoma in 2006 and again in 2016. She is followed regularly by dermatologist.  Her last examination was only about a month or so ago. She was felt to be doing fine, without any new recurrent lesions. She reports that her blood pressure readings are typically in the 130/80 mmHg range while on a combination of amlodipine 2.5 mg daily and Diovan 320 mg daily. She denies any lightheadedness, dizziness, chest pain, or other cardiopulmonary symptoms. She notes that she had undergone a left heart catheterization in 2012 which was unremarkable. Otherwise, she has been doing about the same as usual.  She wonders if she should get a second COVID booster; her last COVID booster was in August of last year. She typically gets her cervical cancer screening and breast cancer screening with the [address] [place] [address], with her most recent screening examinations performed in late May. She is due for repeat bone mineral density testing in 2023. Her review of systems is otherwise negative. Routine Healthcare Maintenance issues are reviewed and discussed with the patient as noted below. Orders to update gaps in healthcare maintenance were placed as noted below in the Assessment and Plan, where applicable. She lives at home with her  and they remain fully independent with regard to all activities and instrumental activities of daily living. There is no concern for abuse. She does not drink alcohol. Physical examination is unremarkable. Neurological examination is grossly nonfocal.    10-year Cardiovascular Risk Eneida Sanket, 2013):   The 10-year ASCVD risk score (Mariana Peterson, et al., 2013) is: 9.1%    Values used to calculate the score:      Age: 73 years      Sex: Female      Is Non- : No      Diabetic: No Tobacco smoker: No      Systolic Blood Pressure: 414 mmHg      Is BP treated: Yes      HDL Cholesterol: 84 MG/DL      Total Cholesterol: 196 MG/DL       Past Medical History:  Past Medical History:   Diagnosis Date    Former smoker, stopped smoking in distant past     History of abdominal pain     History of echocardiogram 2012    Left ventricle: Ejection fraction was estimated to be 65 %. Mitral valve: There was mild regurgitation. Aortic valve: There was no stenosis. Tricuspid valve: There was mild regurgitation    History of left heart catheterization 2012    1. Angiographically normal coronary arteries with normal left ventricular function. 2. Mildly elevated left ventricular filling pressures. No aortic stenosis. Systemic arterial pressures within normal limits. 3. Normal left ventricular function with an estimated ejection fraction of 60% and no significant mitral regurgitation. 4. Angiographically normal coronary arteries in a right dominant system    History of melanoma     Hypertension     Long term current use of immunosuppressive drug     Rheumatoid arthritis ([facility] [address]     RX = Etanercept, Sulfsalazine, Plaquenil    Transaminitis     Vitamin D deficiency        Past Surgical Histor:  Past Surgical History:   Procedure Laterality Date    COLONOSCOPY N/A 10/1/2021    COLONOSCOPY performed by Susan Jones MD at [facility]; HI RISK IND  10/1/2021         HX GYN       X2    HX MALIGNANT SKIN LESION EXCISION      melanoma x2    HX OTHER SURGICAL      lymph node removed from Right arm benign    HX OTHER SURGICAL  2021    Left upper chest subcutaneous nodule excision       Allergies:   Allergies   Allergen Reactions    Shellfish Derived Shortness of Breath    Levaquin [Levofloxacin] Rash    Codeine Rash    Pcn [Penicillins] Rash    Seafood [Shellfish Containing Products] Shortness of Breath       Medications:  Current Outpatient Medications   Medication Sig Dispense Refill    valsartan (DIOVAN) 320 mg tablet Take 1 Tablet by mouth daily. 90 Tablet 1    amLODIPine (NORVASC) 2.5 mg tablet Take 1 Tablet by mouth daily. 90 Tablet 1    etanercept (ENBREL) 50 mg/mL (0.98 mL) injection 50 mg by SubCUTAneous route every seven (7) days. Indications: RHEUMATOID ARTHRITIS      sulfaSALAzine (AZULFIDINE) 500 mg tablet Take 500 mg by mouth two (2) times a day. Take two tablets twice daily   Indications: RHEUMATOID ARTHRITIS      hydroxychloroquine (PLAQUINIL) 200 mg tablet Take 200 mg by mouth two (2) times a day. Take one tablet twice daily       acetaminophen (TYLENOL) 500 mg tablet Take 1,000 mg by mouth every six (6) hours as needed.            Social History:  Social History     Socioeconomic History    Marital status:     Number of children: 2   Tobacco Use    Smoking status: Former Smoker     Packs/day: 1.00     Years: 1.00     Pack years: 1.00     Types: Cigarettes, Cigars    Smokeless tobacco: Former User     Quit date: 1974    Tobacco comment: quit 1974   Vaping Use    Vaping Use: Never used   Substance and Sexual Activity    Alcohol use: No    Drug use: Not Currently       Family History:  Family History   Problem Relation Age of Onset    Breast Cancer Mother     Heart Disease Father     Dementia Father        Immunizations:  Immunization History   Administered Date(s) Administered    COVID-19, Pfizer Purple top, DILUTE for use, 12+ yrs, 30mcg/0.3mL dose 03/15/2021, 04/05/2021, 08/23/2021    Influenza High Dose Vaccine PF 10/01/2021    Influenza Vaccine 10/01/2019    Influenza Vaccine (Quadrivalent)(>18 Yrs Flublok 33140) 10/18/2018    Pneumococcal Conjugate (PCV-13) 10/18/2018    Pneumococcal Polysaccharide (PPSV-23) 01/01/2011    Tdap 08/14/2018        Healthcare Maintenance:  Health Maintenance   Topic Date Due    Cervical cancer screen  Never done    Shingrix Vaccine Age 50> (1 of 2) Never done    Bone Densitometry (Dexa) Screening  Never done    Pneumococcal 65+ years (2 - PPSV23 or PCV20) 07/11/2021    Breast Cancer Screen Mammogram  05/13/2022    Medicare Yearly Exam  05/15/2022    Depression Screen  11/18/2022    Lipid Screen  05/14/2026    Colorectal Cancer Screening Combo  10/01/2026    DTaP/Tdap/Td series (2 - Td or Tdap) 08/14/2028    Hepatitis C Screening  Completed    Flu Vaccine  Completed    COVID-19 Vaccine  Completed        Review of Systems:  ROS:  Review of Systems   Constitutional: Negative. HENT: Negative. Eyes: Negative. Respiratory: Negative. Cardiovascular: Negative. Gastrointestinal: Negative. Genitourinary: Negative. Musculoskeletal: Positive for back pain and joint pain (knee). Skin: Negative. Neurological: Negative. Endo/Heme/Allergies: Negative. Psychiatric/Behavioral: Negative. ROS otherwise negative      Objective:     Vital Signs:  Visit Vitals  BP (!) 156/81 (BP 1 Location: Left upper arm, BP Patient Position: Sitting, BP Cuff Size: Large adult)   Pulse 63   Temp 98.1 °F (36.7 °C) (Oral)   Resp 20   Ht 5' 9\" (1.753 m)   Wt 180 lb 4.8 oz (81.8 kg)   SpO2 97%   BMI 26.63 kg/m²       BMI:  Body mass index is 26.63 kg/m². Physical Examination:  Physical Exam  Constitutional:       Appearance: Normal appearance. She is normal weight. HENT:      Head: Normocephalic and atraumatic. Right Ear: External ear normal.      Left Ear: External ear normal.      Nose: Nose normal.      Mouth/Throat:      Mouth: Mucous membranes are moist.      Pharynx: Oropharynx is clear. No oropharyngeal exudate or posterior oropharyngeal erythema. Cardiovascular:      Rate and Rhythm: Normal rate and regular rhythm. Pulses: Normal pulses. Heart sounds: Normal heart sounds. No murmur heard. No friction rub. No gallop. Pulmonary:      Effort: Pulmonary effort is normal. No respiratory distress. Breath sounds: Normal breath sounds.  No wheezing, rhonchi or rales. Abdominal:      General: Abdomen is flat. Bowel sounds are normal. There is no distension. Palpations: Abdomen is soft. Tenderness: There is no abdominal tenderness. There is no guarding. Musculoskeletal:         General: No swelling, tenderness or deformity. Normal range of motion. Cervical back: Normal range of motion and neck supple. No rigidity or tenderness. Skin:     General: Skin is warm and dry. Findings: No erythema, lesion or rash. Neurological:      General: No focal deficit present. Mental Status: She is alert and oriented to person, place, and time. Mental status is at baseline. Sensory: No sensory deficit. Motor: No weakness. Gait: Gait normal.      Deep Tendon Reflexes: Reflexes normal.   Psychiatric:         Mood and Affect: Mood normal.         Behavior: Behavior normal.         Judgment: Judgment normal.          Physical exam otherwise negative    Diagnostic Testing:    Laboratory Studies:  Hospital Outpatient Visit on 09/27/2021   Component Date Value Ref Range Status    Specimen source 09/27/2021 Nasopharyngeal    Final    SARS-CoV-2 09/27/2021 Not detected  NOTD   Final    Comment:      The specimen is NEGATIVE for SARS-CoV-2, the novel coronavirus associated with COVID-19. A negative result does not rule out COVID-19. Yane SARS-CoV-2 for use on the [employer]ane 6800/[employer] is a real-time RT-PCR test intended for the qualitative detection of nucleic acids from SARS-CoV-2  in clinician-collected nasal, nasopharyngeal,and oropharyngeal swab specimens from individuals who meet COVID-19 clinical and/or epidemiological criteria. Yane SARS-CoV-2 is for use only under Emergency Use Authorization (EUA) in laboratories certified under [address] (CLIA), 42 [place], that meet requirements to perform high or moderate complexity tests.        An individual without symptoms of COVID-19 and who is not shedding SARS-CoV-2 virus would expect to have a negative (not detected) result in this assay. Fact sheet for Healthcare Providers: ConventionBusiness Exchangedate.co.nz  Fact sheet for Patients: http://www.[address]/                           [address]/download       Methodology: RT-PCR           Radiographic Studies:  XR Results (most recent):  Results from Hospital Encounter encounter on 03/23/17    XR CHEST PORT    Narrative  EXAM: Portable CXR.  2359 hours    INDICATION: Chest tightness, shortness of breath, dizziness, sweating and nausea  and feeling faint tonight. The lungs are clear. Heart is normal in size. There is no overt pulmonary edema. There is no evident pneumothorax, apparent adenopathy or sizable pleural  effusion. Impression  IMPRESSION: No Acute Disease. [facility] Results (most recent):  Results from [place] encounter on 05/05/15    [facility] [address]    Narrative  **Final Report**      ICD Codes / Adm. Diagnosis: V76.12  780.60 / Other screening mammogram  Examination:  MM MAMMO DIG SCRN BI  - [id number] - May  5 2015 10:42AM  Accession No:  [id number]  Reason:  screening      REPORT:  EXAM:  MM MAMMO DIG SCRN BI    INDICATION:  Screening, maternal history of breast cancer      COMPARISON:  2013 and earlier    BREAST COMPOSITION: There are scattered fibroglandular densities  (approximately 25-50% glandular). FINDINGS:  Bilateral digital screening mammography was performed, and is interpreted in  conjunction with a computer assisted detection (CAD) system. No suspicious  masses or calcifications are identified. There is no skin thickening or  nipple retraction. There has been no significant change. Impression  :    BIRADS 1: Negative. No mammographic evidence of malignancy. Next screening mammogram is recommended in one year. The patient will be  notified of these results.           Signing/Reading Doctor: Dontrell Sanchez ([id number])  ApprovedLojosh Villagran ([id number])  May  6 2015 10:04AM     CT Results (most recent):  Results from Hospital Encounter encounter on 06/18/12    CT ABD PELV W CONT    Narrative  **Final Report**      ICD Codes / Adm. Diagnosis: 995.90  780.60 / Systemic inflammatory response  Fever, unspecified  Examination:  CT ABD PELVIS W CON  - [id number] - Jun 19 2012  1:19AM  Accession No:  [id number]  Reason:  pain, fever      REPORT:  INDICATION:  Left lower pain, fever. COMPARISON:  No old study. TECHNIQUE:  Contiguous CT images were obtained of the abdomen with  100 mL  of Optiray 320 contrast.  Contiguous CT images were obtained of the pelvis  with Optiray contrast.  Oral contrast was administered as well. Coronal and  sagittal reformatted images were then obtained. CT ABDOMEN:  The liver, spleen, pancreas, and adrenal glands show a  satisfactory appearance. The kidneys show symmetric function and no  hydronephrosis. The abdomen aorta is normal in caliber. The gallbladder is  mildly distended. The biliary ducts are not dilated. Tiny greater on the  left pleural effusions with very slight basilar atelectasis are present. Lower thoracic degenerative disease and L4 discogenic disease are noted. The small bowel is normal in caliber. The appendix is normal configuration. Tiny mesenteric lymph nodes are present. Fecal stasis of the colon is  noted. CT PELVIS:  The urinary bladder is minimally distended. The uterus and  ovaries are normal in size. A mild amount of fluid is present in the  cul-de-sac. IMPRESSION:  Mild amount of fluid cul-de-sac. Signing/Reading Doctor: Brooke Rodriguez ([id number])  Approved: Brooke Rodriguez ([id number])  06/19/2012     DEXA Results (most recent):  No results found for this or any previous visit. MRI Results (most recent):  No results found for this or any previous visit. Assessment/Plan:       ICD-10-CM ICD-9-CM    1. Routine adult health maintenance  Z00.00 V70.0 HEMOGLOBIN A1C WITH EAG      CBC WITH AUTOMATED DIFF      METABOLIC PANEL, COMPREHENSIVE      LIPID PANEL   2. Melanoma of thoracic region (HCC)  C43.59 172.5    3. Rheumatoid arthritis involving multiple sites, unspecified whether rheumatoid factor present ([facility]  M06.9 714.0    4. Primary hypertension  I10 401.9    5. Vitamin D deficiency  E55.9 268.9 VITAMIN D, 25 HYDROXY   6. Postmenopausal state  Z78.0 V49.81    7. Encounter for immunization  Z23 V03.89 ADMIN PNEUMOCOCCAL VACCINE      PNEUMOCOCCAL CONJUGATE PCV20, PF (PREVNAR 20)   8. Encounter for screening mammogram for malignant neoplasm of breast  Z12.31 V76.12    9. Long term current use of immunosuppressive drug  Z79.899 V58.69           Healthcare Maintenance:  - Preventive measures are reviewed as per above  - Up to date on routine interventions except as noted above  - Orders placed to update gaps as noted  - Notes: Patient is up-to-date on mammography and bone density testing as well as Pap smear. Fasting labs ordered. PCV 20 today. History of melanoma:  - Follows regularly with dermatology. No recent recurrent or new lesions. Last visit within the past month or 2. Back Pain:   - No red flags: history negative for incontinence, fever, weight loss, recent steroid use, leg weakness, new numbness, new weakness, new tingling. Does have history of remote melanoma, treated. - Current Pharmacotherapy:   Key Pain Meds             acetaminophen (TYLENOL) 500 mg tablet (Taking) Take 1,000 mg by mouth every six (6) hours as needed. - Physical therapy referral active:No   - Pain management referral active: No   - Duloxetine: No   - Nonpharmacologic therapies tried or discussed:    - Yoga: Yes     - Nahid Chi: No    - Massage: Yes    - Acupuncture: Yes   - Notes: Discussed yoga. No red flag symptoms today. Trial of conservative therapy reasonable for now. However, note history of melanoma. Advised patient that should her symptoms fail to resolve with trial of conservative therapy she should return to clinic with low threshold for obtaining imaging. Essential Hypertension/Blood Pressure Management:   - Home BP Readings: usual 130/80 mmHg   - Current Control: optimal; repeat 130/80 mmHg   - Target BP: Less than 140/90 mmHg   - Relevant BP Meds:  Key CAD CHF Meds             valsartan (DIOVAN) 320 mg tablet (Taking) Take 1 Tablet by mouth daily. amLODIPine (NORVASC) 2.5 mg tablet (Taking) Take 1 Tablet by mouth daily.           - Plan: continue current meds, dietary sodium restriction, regular aerobic exercise, weight loss   - Notes: ---      Rheumatoid Arthritis:   - Well-controlled symptomatically on current immunosuppressive regimen. We discussed the COVID-19 vaccine booster, and I advised her to get this given her ongoing immunosuppression. She is up-to-date with regard to eye examination. Checking LFTs as noted above. Checking CBC as noted above. Continue follow-up with rheumatology. Consider statin therapy given elevated risk of coronary artery disease but note previous left heart catheterization 10 years ago was unremarkable. Armani Odell MD    Please note that this dictation was completed with [employer], the [employer] voice recognition software. Quite often unanticipated grammatical, syntax, homophones, and other interpretive errors are inadvertently transcribed by the computer software. Please disregard these errors. Please excuse any errors that have escaped final proofreading. This is the Subsequent Medicare Annual Wellness Exam, performed 12 months or more after the Initial AWV or the last Subsequent AWV    I have reviewed the patient's medical history in detail and updated the computerized patient record. Assessment/Plan   Education and counseling provided:  Are appropriate based on today's review and evaluation  Pneumococcal Vaccine    1.  Routine adult health maintenance  -     HEMOGLOBIN A1C WITH EAG; Future  -     CBC WITH AUTOMATED DIFF; Future  -     METABOLIC PANEL, COMPREHENSIVE; Future  -     LIPID PANEL; Future  2. Melanoma of thoracic region ([facility] [address]  3. Rheumatoid arthritis involving multiple sites, unspecified whether rheumatoid factor present ([facility] [address]  4. Primary hypertension  5. Vitamin D deficiency  -     VITAMIN D, 25 HYDROXY; Future  6. Postmenopausal state  7. Encounter for immunization  -     [address] [place] [address]; Future  -     PNEUMOCOCCAL CONJUGATE PCV20, PF (PREVNAR 20); Future  8. Encounter for screening mammogram for malignant neoplasm of breast  9. Long term current use of immunosuppressive drug       Depression Risk Factor Screening     3 most recent PHQ Screens 5/27/2022   Little interest or pleasure in doing things Not at all   Feeling down, depressed, irritable, or hopeless Not at all   Total Score PHQ 2 0       Alcohol & Drug Abuse Risk Screen    Do you average more than 1 drink per night or more than 7 drinks a week:  No    On any one occasion in the past three months have you have had more than 3 drinks containing alcohol:  No          Functional Ability and Level of Safety    Hearing: Hearing is good. Activities of Daily Living: The home contains: handrails and grab bars  Patient does total self care      Ambulation: with no difficulty     Fall Risk:  Fall Risk Assessment, last 12 mths 5/27/2022   Able to walk? Yes   Fall in past 12 months? 0   Do you feel unsteady?  0   Are you worried about falling 0      Abuse Screen:  Patient is not abused       Cognitive Screening    Has your family/caregiver stated any concerns about your memory: no     Cognitive Screening: Normal - Verbal Fluency Test    Health Maintenance Due     Health Maintenance Due   Topic Date Due    Cervical cancer screen  Never done    Shingrix Vaccine Age 50> (1 of 2) Never done    Bone Densitometry (Dexa) Screening  Never done    Pneumococcal 65+ years (2 - PPSV23 or PCV20) 07/11/2021    Breast Cancer Screen Mammogram  05/13/2022    Medicare Yearly Exam  05/15/2022       Patient Care Team   Patient Care Team:  Kennedi Tapia MD as PCP - General (Internal Medicine Physician)  Hillary Pyle MD as PCP - [facility]  Spike Sawyer MD (Obstetrics & Gynecology)  Cate Win MD as Surgeon (General Surgery)    History     Patient Active Problem List   Diagnosis Code    Rheumatoid arthritis ([facility] [address] M06.9    Abdominal pain R10.9    Elevated liver function tests R79.89    Sepsis, unspecified A41.9    History of melanoma Z85.820    HTN (hypertension) I10    Adverse drug reaction T50.905A    Long term current use of immunosuppressive drug Z79.899    Transaminitis R74.01    Hypertension I10    History of abdominal pain Z87.898    Former smoker, stopped smoking in distant past Z87.891    Melanoma of thoracic region [facility]) C43.59    Vitamin D deficiency E55.9     Past Medical History:   Diagnosis Date    Former smoker, stopped smoking in distant past     History of abdominal pain     History of echocardiogram 06/2012    Left ventricle: Ejection fraction was estimated to be 65 %. Mitral valve: There was mild regurgitation. Aortic valve: There was no stenosis. Tricuspid valve: There was mild regurgitation    History of left heart catheterization 06/2012    1. Angiographically normal coronary arteries with normal left ventricular function. 2. Mildly elevated left ventricular filling pressures. No aortic stenosis. Systemic arterial pressures within normal limits. 3. Normal left ventricular function with an estimated ejection fraction of 60% and no significant mitral regurgitation.  4. Angiographically normal coronary arteries in a right dominant system    History of melanoma     Hypertension     Long term current use of immunosuppressive drug     Rheumatoid arthritis ([facility] [address]     RX = Etanercept, Sulfsalazine, Plaquenil    Transaminitis     Vitamin D deficiency       Past Surgical History:   Procedure Laterality Date    COLONOSCOPY N/A 10/1/2021    COLONOSCOPY performed by Sourav Coreas MD at [facility]; HI RISK IND  10/1/2021         HX GYN       X2    HX MALIGNANT SKIN LESION EXCISION      melanoma x2    HX OTHER SURGICAL      lymph node removed from Right arm benign    HX OTHER SURGICAL  2021    Left upper chest subcutaneous nodule excision     Current Outpatient Medications   Medication Sig Dispense Refill    valsartan (DIOVAN) 320 mg tablet Take 1 Tablet by mouth daily. 90 Tablet 1    amLODIPine (NORVASC) 2.5 mg tablet Take 1 Tablet by mouth daily. 90 Tablet 1    etanercept (ENBREL) 50 mg/mL (0.98 mL) injection 50 mg by SubCUTAneous route every seven (7) days. Indications: RHEUMATOID ARTHRITIS      sulfaSALAzine (AZULFIDINE) 500 mg tablet Take 500 mg by mouth two (2) times a day. Take two tablets twice daily   Indications: RHEUMATOID ARTHRITIS      hydroxychloroquine (PLAQUINIL) 200 mg tablet Take 200 mg by mouth two (2) times a day. Take one tablet twice daily       acetaminophen (TYLENOL) 500 mg tablet Take 1,000 mg by mouth every six (6) hours as needed.          Allergies   Allergen Reactions    Shellfish Derived Shortness of Breath    Levaquin [Levofloxacin] Rash    Codeine Rash    Pcn [Penicillins] Rash    Seafood [Shellfish Containing Products] Shortness of Breath       Family History   Problem Relation Age of Onset    Breast Cancer Mother     Heart Disease Father     Dementia Father      Social History     Tobacco Use    Smoking status: Former Smoker     Packs/day: 1.00     Years: 1.00     Pack years: 1.00     Types: Cigarettes, Cigars    Smokeless tobacco: Former User     Quit date:     Tobacco comment: quit    Substance Use Topics    Alcohol use: Almaz Clay MD

## 2022-05-27 ENCOUNTER — OFFICE VISIT (OUTPATIENT)
Dept: INTERNAL MEDICINE CLINIC | Age: 67
End: 2022-05-27
Payer: MEDICARE

## 2022-05-27 VITALS
HEART RATE: 63 BPM | SYSTOLIC BLOOD PRESSURE: 130 MMHG | WEIGHT: 180.3 LBS | HEIGHT: 69 IN | BODY MASS INDEX: 26.7 KG/M2 | RESPIRATION RATE: 20 BRPM | DIASTOLIC BLOOD PRESSURE: 84 MMHG | TEMPERATURE: 98.1 F | OXYGEN SATURATION: 97 %

## 2022-05-27 DIAGNOSIS — Z79.899 LONG TERM CURRENT USE OF IMMUNOSUPPRESSIVE DRUG: ICD-10-CM

## 2022-05-27 DIAGNOSIS — C43.59 MELANOMA OF THORACIC REGION (HCC): ICD-10-CM

## 2022-05-27 DIAGNOSIS — Z23 ENCOUNTER FOR IMMUNIZATION: ICD-10-CM

## 2022-05-27 DIAGNOSIS — Z00.00 MEDICARE ANNUAL WELLNESS VISIT, SUBSEQUENT: ICD-10-CM

## 2022-05-27 DIAGNOSIS — M06.9 RHEUMATOID ARTHRITIS INVOLVING MULTIPLE SITES, UNSPECIFIED WHETHER RHEUMATOID FACTOR PRESENT (HCC): ICD-10-CM

## 2022-05-27 DIAGNOSIS — Z00.00 ROUTINE ADULT HEALTH MAINTENANCE: Primary | ICD-10-CM

## 2022-05-27 DIAGNOSIS — I10 PRIMARY HYPERTENSION: ICD-10-CM

## 2022-05-27 DIAGNOSIS — E55.9 VITAMIN D DEFICIENCY: ICD-10-CM

## 2022-05-27 PROCEDURE — 90677 PCV20 VACCINE IM: CPT | Performed by: INTERNAL MEDICINE

## 2022-05-27 PROCEDURE — G0009 ADMIN PNEUMOCOCCAL VACCINE: HCPCS | Performed by: INTERNAL MEDICINE

## 2022-05-27 PROCEDURE — G0439 PPPS, SUBSEQ VISIT: HCPCS | Performed by: INTERNAL MEDICINE

## 2022-05-27 PROCEDURE — 1123F ACP DISCUSS/DSCN MKR DOCD: CPT | Performed by: INTERNAL MEDICINE

## 2022-05-27 PROCEDURE — 99215 OFFICE O/P EST HI 40 MIN: CPT | Performed by: INTERNAL MEDICINE

## 2022-05-27 NOTE — PROGRESS NOTES
Chief Complaint   Patient presents with   Breannekedar Meena [facility]           1. \"Have you been to the ER, urgent care clinic since your last visit? Hospitalized since your last visit? \" No    2. \"Have you seen or consulted any other health care providers outside of the [address] [place] [address] since your last visit? \" No     3. For patients aged 39-70: Has the patient had a colonoscopy / FIT/ Cologuard? No      If the patient is female:    4. For patients aged 41-77: Has the patient had a mammogram within the past 2 years? No      5. For patients aged 21-65: Has the patient had a pap smear?  No

## 2022-05-27 NOTE — PROGRESS NOTES
After obtaining written consent and per orders of Dr. Shruti Akers, injection of [id number] given by Pino Toure, [address] [place], [place] [address]thomas. Order and injection/medication verified by Carmen Burkett. Patient tolerated procedure well. VIS was given to them. No reactions noted.

## 2022-05-27 NOTE — PATIENT INSTRUCTIONS
Please continue monitoring your blood pressure at home using your home blood pressure monitor. Please record your readings and bring these with you to your next visit. DASH Diet: Care Instructions  Your Care Instructions     The DASH diet is an eating plan that can help lower your blood pressure. DASH stands for Dietary Approaches to Stop Hypertension. Hypertension is high blood pressure. The DASH diet focuses on eating foods that are high in calcium, potassium, and magnesium. These nutrients can lower blood pressure. The foods that are highest in these nutrients are fruits, vegetables, low-fat dairy products, nuts, seeds, and legumes. But taking calcium, potassium, and magnesium supplements instead of eating foods that are high in those nutrients does not have the same effect. The DASH diet also includes whole grains, fish, and poultry. The DASH diet is one of several lifestyle changes your doctor may recommend to lower your high blood pressure. Your doctor may also want you to decrease the amount of sodium in your diet. Lowering sodium while following the DASH diet can lower blood pressure even further than just the DASH diet alone. Follow-up care is a key part of your treatment and safety. Be sure to make and go to all appointments, and call your doctor if you are having problems. It's also a good idea to know your test results and keep a list of the medicines you take. How can you care for yourself at home? Following the DASH diet  · Eat 4 to 5 servings of fruit each day. A serving is 1 medium-sized piece of fruit, ½ cup chopped or canned fruit, 1/4 cup dried fruit, or 4 ounces (½ cup) of fruit juice. Choose fruit more often than fruit juice. · Eat 4 to 5 servings of vegetables each day. A serving is 1 cup of lettuce or raw leafy vegetables, ½ cup of chopped or cooked vegetables, or 4 ounces (½ cup) of vegetable juice. Choose vegetables more often than vegetable juice.   · Get 2 to 3 servings of low-fat and fat-free dairy each day. A serving is 8 ounces of milk, 1 cup of yogurt, or 1 ½ ounces of cheese. · Eat 6 to 8 servings of grains each day. A serving is 1 slice of bread, 1 ounce of dry cereal, or ½ cup of cooked rice, pasta, or cooked cereal. Try to choose whole-grain products as much as possible. · Limit lean meat, poultry, and fish to 2 servings each day. A serving is 3 ounces, about the size of a deck of cards. · Eat 4 to 5 servings of nuts, seeds, and legumes (cooked dried beans, lentils, and split peas) each week. A serving is 1/3 cup of nuts, 2 tablespoons of seeds, or ½ cup of cooked beans or peas. · Limit fats and oils to 2 to 3 servings each day. A serving is 1 teaspoon of vegetable oil or 2 tablespoons of salad dressing. · Limit sweets and added sugars to 5 servings or less a week. A serving is 1 tablespoon jelly or jam, ½ cup sorbet, or 1 cup of lemonade. · Eat less than 2,300 milligrams (mg) of sodium a day. If you limit your sodium to 1,500 mg a day, you can lower your blood pressure even more. · Be aware that all of these are the suggested number of servings for people who eat 1,800 to 2,000 calories a day. Your recommended number of servings may be different if you need more or fewer calories. Tips for success  · Start small. Do not try to make dramatic changes to your diet all at once. You might feel that you are missing out on your favorite foods and then be more likely to not follow the plan. Make small changes, and stick with them. Once those changes become habit, add a few more changes. · Try some of the following:  ? Make it a goal to eat a fruit or vegetable at every meal and at snacks. This will make it easy to get the recommended amount of fruits and vegetables each day. ? Try yogurt topped with fruit and nuts for a snack or healthy dessert. ? Add lettuce, tomato, cucumber, and onion to sandwiches.   ? Combine a ready-made pizza crust with low-fat mozzarella cheese and lots of vegetable toppings. Try using tomatoes, squash, spinach, broccoli, carrots, cauliflower, and onions. ? Have a variety of cut-up vegetables with a low-fat dip as an appetizer instead of chips and dip. ? Sprinkle sunflower seeds or chopped almonds over salads. Or try adding chopped walnuts or almonds to cooked vegetables. ? Try some vegetarian meals using beans and peas. Add garbanzo or kidney beans to salads. Make burritos and tacos with mashed norwood beans or black beans. Where can you learn more? Go to http://www.chopra.com/  Enter H967 in the search box to learn more about \"DASH Diet: Care Instructions. \"  Current as of: January 10, 2022               Content Version: 13.2  © 8769-3925 [employer]. Care instructions adapted under license by [employer] (which disclaims liability or warranty for this information). If you have questions about a medical condition or this instruction, always ask your healthcare professional. [place] [address] any warranty or liability for your use of this information. Medicare Wellness Visit, Female     The best way to live healthy is to have a lifestyle where you eat a well-balanced diet, exercise regularly, limit alcohol use, and quit all forms of tobacco/nicotine, if applicable. Regular preventive services are another way to keep healthy. Preventive services (vaccines, screening tests, monitoring & exams) can help personalize your care plan, which helps you manage your own care. Screening tests can find health problems at the earliest stages, when they are easiest to treat. [facility] follows the current, evidence-based guidelines published by the [place] [employer] (USPSTF) when recommending preventive services for our patients. Because we follow these guidelines, sometimes recommendations change over time as research supports it.  (For example, mammograms used to be recommended annually. Even though Medicare will still pay for an annual mammogram, the newer guidelines recommend a mammogram every two years for women of average risk). Of course, you and your doctor may decide to screen more often for some diseases, based on your risk and your co-morbidities (chronic disease you are already diagnosed with). Preventive services for you include:  - Medicare offers their members a free annual wellness visit, which is time for you and your primary care provider to discuss and plan for your preventive service needs. Take advantage of this benefit every year!  -All adults over the age of 72 should receive the recommended pneumonia vaccines. Current USPSTF guidelines recommend a series of two vaccines for the best pneumonia protection.   -All adults should have a flu vaccine yearly and a tetanus vaccine every 10 years.   -All adults age 48 and older should receive the shingles vaccines (series of two vaccines). -All adults age 38-68 who are overweight should have a diabetes screening test once every three years.   -All adults born between [birth year] and 1964 should be screened once for Hepatitis C.  -Other screening tests and preventive services for persons with diabetes include: an eye exam to screen for diabetic retinopathy, a kidney function test, a foot exam, and stricter control over your cholesterol.   -Cardiovascular screening for adults with routine risk involves an electrocardiogram (ECG) at intervals determined by your doctor.   -Colorectal cancer screenings should be done for adults age 54-65 with no increased risk factors for colorectal cancer. There are a number of acceptable methods of screening for this type of cancer. Each test has its own benefits and drawbacks. Discuss with your doctor what is most appropriate for you during your annual wellness visit.  The different tests include: colonoscopy (considered the best screening method), a fecal occult blood test, a fecal DNA test, and sigmoidoscopy.    -A bone mass density test is recommended when a woman turns 65 to screen for osteoporosis. This test is only recommended one time, as a screening. Some providers will use this same test as a disease monitoring tool if you already have osteoporosis. -Breast cancer screenings are recommended every other year for women of normal risk, age 54-69.  -Cervical cancer screenings for women over age 72 are only recommended with certain risk factors.      Here is a list of your current Health Maintenance items (your personalized list of preventive services) with a due date:  Health Maintenance Due   Topic Date Due    Cervical cancer screen  Never done    Shingles Vaccine (1 of 2) Never done    Bone Mineral Density   Never done    Pneumococcal Vaccine (2 - PPSV23 or PCV20) 07/11/2021    Mammogram  05/13/2022

## 2022-05-28 LAB
25(OH)D3 SERPL-MCNC: 20.7 NG/ML (ref 30–100)
ALBUMIN SERPL-MCNC: 4.4 G/DL (ref 3.5–5)
ALBUMIN/GLOB SERPL: 1.3 {RATIO} (ref 1.1–2.2)
ALP SERPL-CCNC: 92 U/L (ref 45–117)
ALT SERPL-CCNC: 25 U/L (ref 12–78)
ANION GAP SERPL CALC-SCNC: 6 MMOL/L (ref 5–15)
AST SERPL-CCNC: 21 U/L (ref 15–37)
BASOPHILS # BLD: 0 K/UL (ref 0–0.1)
BASOPHILS NFR BLD: 1 % (ref 0–1)
BILIRUB SERPL-MCNC: 0.4 MG/DL (ref 0.2–1)
BUN SERPL-MCNC: 15 MG/DL (ref 6–20)
BUN/CREAT SERPL: 25 (ref 12–20)
CALCIUM SERPL-MCNC: 9.5 MG/DL (ref 8.5–10.1)
CHLORIDE SERPL-SCNC: 99 MMOL/L (ref 97–108)
CHOLEST SERPL-MCNC: 196 MG/DL
CO2 SERPL-SCNC: 29 MMOL/L (ref 21–32)
CREAT SERPL-MCNC: 0.59 MG/DL (ref 0.55–1.02)
DIFFERENTIAL METHOD BLD: NORMAL
EOSINOPHIL # BLD: 0.1 K/UL (ref 0–0.4)
EOSINOPHIL NFR BLD: 2 % (ref 0–7)
ERYTHROCYTE [DISTWIDTH] IN BLOOD BY AUTOMATED COUNT: 12.6 % (ref 11.5–14.5)
EST. AVERAGE GLUCOSE BLD GHB EST-MCNC: 88 MG/DL
GLOBULIN SER CALC-MCNC: 3.5 G/DL (ref 2–4)
GLUCOSE SERPL-MCNC: 85 MG/DL (ref 65–100)
HBA1C MFR BLD: 4.7 % (ref 4–5.6)
HCT VFR BLD AUTO: 43.9 % (ref 35–47)
HDLC SERPL-MCNC: 70 MG/DL
HDLC SERPL: 2.8 {RATIO} (ref 0–5)
HGB BLD-MCNC: 13.9 G/DL (ref 11.5–16)
IMM GRANULOCYTES # BLD AUTO: 0 K/UL (ref 0–0.04)
IMM GRANULOCYTES NFR BLD AUTO: 0 % (ref 0–0.5)
LDLC SERPL CALC-MCNC: 115.2 MG/DL (ref 0–100)
LYMPHOCYTES # BLD: 1.2 K/UL (ref 0.8–3.5)
LYMPHOCYTES NFR BLD: 27 % (ref 12–49)
MCH RBC QN AUTO: 31.2 PG (ref 26–34)
MCHC RBC AUTO-ENTMCNC: 31.7 G/DL (ref 30–36.5)
MCV RBC AUTO: 98.7 FL (ref 80–99)
MONOCYTES # BLD: 0.5 K/UL (ref 0–1)
MONOCYTES NFR BLD: 12 % (ref 5–13)
NEUTS SEG # BLD: 2.5 K/UL (ref 1.8–8)
NEUTS SEG NFR BLD: 58 % (ref 32–75)
NRBC # BLD: 0 K/UL (ref 0–0.01)
NRBC BLD-RTO: 0 PER 100 WBC
PLATELET # BLD AUTO: 320 K/UL (ref 150–400)
PMV BLD AUTO: 8.9 FL (ref 8.9–12.9)
POTASSIUM SERPL-SCNC: 4.4 MMOL/L (ref 3.5–5.1)
PROT SERPL-MCNC: 7.9 G/DL (ref 6.4–8.2)
RBC # BLD AUTO: 4.45 M/UL (ref 3.8–5.2)
SODIUM SERPL-SCNC: 134 MMOL/L (ref 136–145)
TRIGL SERPL-MCNC: 54 MG/DL (ref ?–150)
VLDLC SERPL CALC-MCNC: 10.8 MG/DL
WBC # BLD AUTO: 4.3 K/UL (ref 3.6–11)

## 2022-05-29 NOTE — PROGRESS NOTES
Please let the patient know that her labs were generally stable. Her vitamin D is low and if she is not already taking supplementation with 1000 to 2000 units daily, she should begin doing this.  Routine follow-up is advised

## 2022-08-02 ENCOUNTER — TRANSCRIBE ORDER (OUTPATIENT)
Dept: SCHEDULING | Age: 67
End: 2022-08-02

## 2022-08-02 DIAGNOSIS — M54.50 CHRONIC LOW BACK PAIN: ICD-10-CM

## 2022-08-02 DIAGNOSIS — G89.29 CHRONIC RIGHT-SIDED LOW BACK PAIN, UNSPECIFIED WHETHER SCIATICA PRESENT: Primary | ICD-10-CM

## 2022-08-02 DIAGNOSIS — G89.29 CHRONIC LOW BACK PAIN: ICD-10-CM

## 2022-08-02 DIAGNOSIS — M54.50 CHRONIC RIGHT-SIDED LOW BACK PAIN, UNSPECIFIED WHETHER SCIATICA PRESENT: Primary | ICD-10-CM

## 2022-08-10 ENCOUNTER — HOSPITAL ENCOUNTER (OUTPATIENT)
Dept: MRI IMAGING | Age: 67
Discharge: HOME | End: 2022-08-10
Attending: ORTHOPAEDIC SURGERY
Payer: MEDICARE

## 2022-08-10 DIAGNOSIS — G89.29 CHRONIC RIGHT-SIDED LOW BACK PAIN, UNSPECIFIED WHETHER SCIATICA PRESENT: ICD-10-CM

## 2022-08-10 DIAGNOSIS — M54.50 CHRONIC LOW BACK PAIN: ICD-10-CM

## 2022-08-10 DIAGNOSIS — M54.50 CHRONIC RIGHT-SIDED LOW BACK PAIN, UNSPECIFIED WHETHER SCIATICA PRESENT: ICD-10-CM

## 2022-08-10 DIAGNOSIS — G89.29 CHRONIC LOW BACK PAIN: ICD-10-CM

## 2022-08-10 PROCEDURE — 72148 MRI LUMBAR SPINE W/O DYE: CPT

## 2022-10-18 RX ORDER — VALSARTAN 320 MG/1
TABLET ORAL
Qty: 90 TABLET | Refills: 1 | Status: SHIPPED | OUTPATIENT
Start: 2022-10-18

## 2022-10-18 RX ORDER — AMLODIPINE BESYLATE 2.5 MG/1
TABLET ORAL
Qty: 90 TABLET | Refills: 1 | Status: SHIPPED | OUTPATIENT
Start: 2022-10-18

## 2022-11-22 NOTE — PROGRESS NOTES
ICD-10-CM ICD-9-CM    1. Routine adult health maintenance  Z00.00 V70.0       2. Primary hypertension  Z51 240.2 METABOLIC PANEL, COMPREHENSIVE      METABOLIC PANEL, COMPREHENSIVE      3. Rheumatoid arthritis involving multiple sites, unspecified whether rheumatoid factor present ([facility]  M06.9 714.0       4. Hypercholesterolemia  E78.00 272.0       5. Postmenopausal  Z78.0 V49.81 DEXA BONE DENSITY STUDY AXIAL      6. Vitamin D deficiency  E55.9 268.9 VITAMIN D, 25 HYDROXY      VITAMIN D, 25 HYDROXY               Subjective:     Chief Complaint   Patient presents with    Cammy Alcala is a 78 y.o. F.  She has a past medical history of hypertension. I reviewed and updated the medical record. I saw this patient most recently for a Medicare wellness visit in late May. She had reported feeling fine at the time except for chronic lower back pain. She was noted to be using a combination of sulfasalazine, hydroxychloroquine, and Enbrel at the direction of her rheumatologist for her history of rheumatoid arthritis. She was felt to be overall asymptomatic in this regard. Physical examination at the time had been generally unremarkable. Blood pressure was elevated, at 156/81 mmHg. She was continued on her usual medication regimen. Routine laboratory studies were obtained. PCV 20 vaccination was performed. Today, the patient comes in for routine follow-up on her chronic medical concerns. In general, she reports feeling fine today. She has had some lower back pain over the past several months, and has been followed by an orthopedic surgeon for this, with an MRI being performed in August had demonstrated lumbar degenerative disc disease at the L3-L4 region. She is being treated conservatively at this time, and has been undergoing cortisone injections of her knees to help her with her joint pain.     She can also continue to be followed by her rheumatologist for her history of rheumatoid arthritis and also continues now on Plaquenil, sulfasalazine, and Enbrel. She reports tolerance medications well. She continues to be up-to-date with regard to her routine eye examinations. She believes that her blood pressure readings are typically in the 130/70 mmHg range overall. While she denies having had any overt chest pain, shortness breath, or any other palpitations, she does relate to feeling \"tired in my chest\" sometimes, not associated with exertion, and sometimes associated with emotional stressors which have been increasing over the past several weeks. She denies having had any change in her exercise tolerance, and continues to swim on a regular basis without any cardiopulmonary limitation. Other than this, she reports feeling about the same as usual without any constitutional symptoms including any fevers or chills, or other problems. She is listed as being overdue for a repeat bone mineral density test, but she thinks that she had one with her gynecologist several years ago and reports that her bone density was felt to be normal.  She continues on vitamin D supplementation as had been started for her previously. Review of systems is otherwise negative. Routine Healthcare Maintenance issues are reviewed and discussed with the patient as noted below. Orders to update gaps in healthcare maintenance were placed as noted below in the Assessment and Plan, where applicable. 10-year Cardiovascular Risk Tahmina Clark, 2013):   The 10-year ASCVD risk score (Ambika COSME, et al., 2019) is: 8.9%    Values used to calculate the score:      Age: 78 years      Sex: Female      Is Non- : No      Diabetic: No      Tobacco smoker: No      Systolic Blood Pressure: 750 mmHg      Is BP treated: Yes      HDL Cholesterol: 70 MG/DL      Total Cholesterol: 196 MG/DL       Past Medical History:  Past Medical History:   Diagnosis Date    Former smoker, stopped smoking in distant past     History of abdominal pain     History of echocardiogram 2012    Left ventricle: Ejection fraction was estimated to be 65 %. Mitral valve: There was mild regurgitation. Aortic valve: There was no stenosis. Tricuspid valve: There was mild regurgitation    History of left heart catheterization 2012    1. Angiographically normal coronary arteries with normal left ventricular function. 2. Mildly elevated left ventricular filling pressures. No aortic stenosis. Systemic arterial pressures within normal limits. 3. Normal left ventricular function with an estimated ejection fraction of 60% and no significant mitral regurgitation. 4. Angiographically normal coronary arteries in a right dominant system    History of melanoma     Hypercholesterolemia     Hypertension     Long term current use of immunosuppressive drug     Lumbar degenerative disc disease     L3-L4    Rheumatoid arthritis (HCC)     RX = Etanercept, Sulfsalazine, Plaquenil    Transaminitis     Vitamin D deficiency        Past Surgical Histor:  Past Surgical History:   Procedure Laterality Date    COLONOSCOPY N/A 10/1/2021    COLONOSCOPY performed by Ted Valerio MD at [address]; HI RISK IND  10/1/2021         HX GYN       X2    HX MALIGNANT SKIN LESION EXCISION      melanoma x2    HX OTHER SURGICAL      lymph node removed from Right arm benign    HX OTHER SURGICAL  2021    Left upper chest subcutaneous nodule excision       Allergies:   Allergies   Allergen Reactions    Shellfish Derived Shortness of Breath    Levaquin [Levofloxacin] Rash    Codeine Rash    Pcn [Penicillins] Rash    Seafood [Shellfish Containing Products] Shortness of Breath       Medications:  Current Outpatient Medications   Medication Sig Dispense Refill    valsartan (DIOVAN) 320 mg tablet TAKE ONE TABLET BY MOUTH DAILY 90 Tablet 1    amLODIPine (NORVASC) 2.5 mg tablet TAKE ONE TABLET BY MOUTH DAILY 90 Tablet 1    etanercept (ENBREL) 50 mg/mL (0.98 mL) injection 50 mg by SubCUTAneous route every seven (7) days. Indications: RHEUMATOID ARTHRITIS      sulfaSALAzine (AZULFIDINE) 500 mg tablet Take 500 mg by mouth two (2) times a day. Take two tablets twice daily   Indications: RHEUMATOID ARTHRITIS      hydroxychloroquine (PLAQUINIL) 200 mg tablet Take 200 mg by mouth two (2) times a day. Take one tablet twice daily       acetaminophen (TYLENOL) 500 mg tablet Take 1,000 mg by mouth every six (6) hours as needed.            Social History:  Social History     Socioeconomic History    Marital status:     Number of children: 2   Tobacco Use    Smoking status: Former     Packs/day: 1.00     Years: 1.00     Pack years: 1.00     Types: Cigarettes, Cigars    Smokeless tobacco: Former     Quit date: 1974    Tobacco comments:     quit 1974   Vaping Use    Vaping Use: Never used   Substance and Sexual Activity    Alcohol use: No    Drug use: Not Currently       Family History:  Family History   Problem Relation Age of Onset    Breast Cancer Mother     Heart Disease Father     Dementia Father        Immunizations:  Immunization History   Administered Date(s) Administered    COVID-19, PFIZER PURPLE top, DILUTE for use, (age 15 y+), IM, 30mcg/0.3mL 03/15/2021, 04/05/2021, 08/23/2021    Influenza High Dose Vaccine PF 10/01/2021, 10/01/2022    Influenza Vaccine 10/20/2008, 10/19/2009, 10/31/2013, 11/07/2016, 10/01/2019    Influenza, AFLURIA (age 10-32 mo), IM, MDV, 0.25 mL, Fluzone (age 10 mo+), AFLURIA (age 1 y+), IM, MDV, 0.5mL 10/18/2017    Influenza, FLUARIX, FLULAVAL, FLUZONE (age 10 mo+) AND AFLURIA, (age 1 y+), PF, 0.5mL 10/05/2015    Influenza, FLUBLOK, (age 25 y+), PF 10/18/2018    Novel Influenza-H1N1-09, All Formulations 12/03/2009    Pneumococcal Conjugate (PCV-13) 10/18/2018    Pneumococcal Conjugate PCV20, PF (Prevnar 20) 05/27/2022    Pneumococcal Polysaccharide (PPSV-23) 01/01/2011    Tdap 08/14/2018        Healthcare Maintenance:  Health Maintenance   Topic Date Due    Shingrix Vaccine Age 49> (1 of 2) Never done    Bone Densitometry (Dexa) Screening  Never done    COVID-19 Vaccine (5 - Booster for Pfizer series) 09/12/2022    Breast Cancer Screen Mammogram  05/23/2023    Depression Screen  05/27/2023    Medicare Yearly Exam  05/28/2023    Colorectal Cancer Screening Combo  10/01/2026    Lipid Screen  05/27/2027    DTaP/Tdap/Td series (2 - Td or Tdap) 08/14/2028    Hepatitis C Screening  Completed    Flu Vaccine  Completed    Pneumococcal 65+ years  Completed        Review of Systems:  ROS:  Review of Systems   Constitutional: Negative. HENT: Negative. Eyes: Negative. Respiratory: Negative. Cardiovascular: Negative. Gastrointestinal: Negative. Genitourinary: Negative. Musculoskeletal: Negative. Skin: Negative. Neurological: Negative. Endo/Heme/Allergies: Negative. Psychiatric/Behavioral: Negative. ROS otherwise negative      Objective:     Vital Signs:  Visit Vitals  BP (!) 140/86 (BP 1 Location: Left upper arm, BP Patient Position: Sitting, BP Cuff Size: Adult)   Pulse 70   Resp 20   Ht 5' 9\" (1.753 m)   Wt 176 lb 14.4 oz (80.2 kg)   SpO2 98%   BMI 26.12 kg/m²       BMI:  Body mass index is 26.12 kg/m². Physical Examination:  Physical Exam  Constitutional:       Appearance: Normal appearance. She is normal weight. HENT:      Head: Normocephalic and atraumatic. Right Ear: External ear normal.      Left Ear: External ear normal.      Nose: Nose normal.      Mouth/Throat:      Mouth: Mucous membranes are moist.      Pharynx: Oropharynx is clear. No oropharyngeal exudate or posterior oropharyngeal erythema. Cardiovascular:      Rate and Rhythm: Normal rate and regular rhythm. Pulses: Normal pulses. Heart sounds: Normal heart sounds. No murmur heard. No friction rub. No gallop. Pulmonary:      Effort: Pulmonary effort is normal. No respiratory distress.       Breath sounds: Normal breath sounds. No wheezing, rhonchi or rales. Abdominal:      General: Abdomen is flat. Bowel sounds are normal. There is no distension. Palpations: Abdomen is soft. Tenderness: There is no abdominal tenderness. There is no guarding. Musculoskeletal:         General: No swelling, tenderness or deformity. Normal range of motion. Cervical back: Normal range of motion and neck supple. No rigidity or tenderness. Skin:     General: Skin is warm and dry. Findings: No erythema, lesion or rash. Neurological:      General: No focal deficit present. Mental Status: She is alert and oriented to person, place, and time. Mental status is at baseline. Sensory: No sensory deficit. Motor: No weakness. Gait: Gait normal.      Deep Tendon Reflexes: Reflexes normal.   Psychiatric:         Mood and Affect: Mood normal.         Behavior: Behavior normal.         Judgment: Judgment normal.        Physical exam otherwise negative    Diagnostic Testing:    Laboratory Studies:  No visits with results within 6 Month(s) from this visit. Latest known visit with results is:   Office Visit on 05/27/2022   Component Date Value Ref Range Status    Vitamin D 25-Hydroxy 05/27/2022 20.7 (A)  30 - 100 ng/mL Final    Comment: (NOTE)  Deficiency               <20 ng/mL  Insufficiency          20-30 ng/mL  Sufficient             ng/mL  Possible toxicity       >100 ng/mL    The Method used is Siemens Advia Centaur currently standardized to a   Center of Disease Control and Prevention (CDC) certified reference   [facility]. Samples containing fluorescein dye can produce falsely   elevated values when tested with the ADVIA Centaur Vitamin D Assay. It is recommended that results in the toxic range, >100 ng/mL, be   retested 72 hours post fluorescein exposure.       Cholesterol, total 05/27/2022 196  <200 MG/DL Final    Triglyceride 05/27/2022 54  <150 MG/DL Final    Based on NCEP-ATP III:  Triglycerides <150 mg/dL is considered normal, 150-199 mg/dL  borderline high,  200-499 mg/dL high and  greater than or equal to 500 mg/dL very high. HDL Cholesterol 05/27/2022 70  MG/DL Final    Based on NCEP ATP III, HDL Cholesterol <40 mg/dL is considered low and >60 mg/dL is elevated. LDL, calculated 05/27/2022 115.2 (A)  0 - 100 MG/DL Final    Comment: Based on the NCEP-ATP: LDL-C concentrations are considered  optimal <100 mg/dL,  near optimal/above Normal 100-129 mg/dL  Borderline High: 130-159, High: 160-189 mg/dL  Very High: Greater than or equal to 190 mg/dL      VLDL, calculated 05/27/2022 10.8  MG/DL Final    CHOL/HDL Ratio 05/27/2022 2.8  0.0 - 5.0   Final    Sodium 05/27/2022 134 (A)  136 - 145 mmol/L Final    Potassium 05/27/2022 4.4  3.5 - 5.1 mmol/L Final    Chloride 05/27/2022 99  97 - 108 mmol/L Final    CO2 05/27/2022 29  21 - 32 mmol/L Final    Anion gap 05/27/2022 6  5 - 15 mmol/L Final    Glucose 05/27/2022 85  65 - 100 mg/dL Final    BUN 05/27/2022 15  6 - 20 MG/DL Final    Creatinine 05/27/2022 0.59  0.55 - 1.02 MG/DL Final    BUN/Creatinine ratio 05/27/2022 25 (A)  12 - 20   Final    GFR est AA 05/27/2022 >60  >60 ml/min/1.73m2 Final    GFR est non-AA 05/27/2022 >60  >60 ml/min/1.73m2 Final    Estimated GFR is calculated using the IDMS-traceable Modification of Diet in Renal Disease (MDRD) Study equation, reported for both  Americans (GFRAA) and non- Americans (GFRNA), and normalized to 1.73m2 body surface area. The physician must decide which value applies to the patient. Calcium 05/27/2022 9.5  8.5 - 10.1 MG/DL Final    Bilirubin, total 05/27/2022 0.4  0.2 - 1.0 MG/DL Final    ALT (SGPT) 05/27/2022 25  12 - 78 U/L Final    AST (SGOT) 05/27/2022 21  15 - 37 U/L Final    Alk.  phosphatase 05/27/2022 92  45 - 117 U/L Final    Protein, total 05/27/2022 7.9  6.4 - 8.2 g/dL Final    Albumin 05/27/2022 4.4  3.5 - 5.0 g/dL Final    Globulin 05/27/2022 3.5  2.0 - 4.0 g/dL Final    A-G Ratio 05/27/2022 1.3  1.1 - 2.2   Final    WBC 05/27/2022 4.3  3.6 - 11.0 K/uL Final    RBC 05/27/2022 4.45  3.80 - 5.20 M/uL Final    HGB 05/27/2022 13.9  11.5 - 16.0 g/dL Final    HCT 05/27/2022 43.9  35.0 - 47.0 % Final    MCV 05/27/2022 98.7  80.0 - 99.0 FL Final    MCH 05/27/2022 31.2  26.0 - 34.0 PG Final    MCHC 05/27/2022 31.7  30.0 - 36.5 g/dL Final    RDW 05/27/2022 12.6  11.5 - 14.5 % Final    PLATELET 60/86/0572 569  150 - 400 K/uL Final    MPV 05/27/2022 8.9  8.9 - 12.9 FL Final    NRBC 05/27/2022 0.0  0  WBC Final    ABSOLUTE NRBC 05/27/2022 0.00  0.00 - 0.01 K/uL Final    NEUTROPHILS 05/27/2022 58  32 - 75 % Final    LYMPHOCYTES 05/27/2022 27  12 - 49 % Final    MONOCYTES 05/27/2022 12  5 - 13 % Final    EOSINOPHILS 05/27/2022 2  0 - 7 % Final    BASOPHILS 05/27/2022 1  0 - 1 % Final    IMMATURE GRANULOCYTES 05/27/2022 0  0.0 - 0.5 % Final    ABS. NEUTROPHILS 05/27/2022 2.5  1.8 - 8.0 K/UL Final    ABS. LYMPHOCYTES 05/27/2022 1.2  0.8 - 3.5 K/UL Final    ABS. MONOCYTES 05/27/2022 0.5  0.0 - 1.0 K/UL Final    ABS. EOSINOPHILS 05/27/2022 0.1  0.0 - 0.4 K/UL Final    ABS. BASOPHILS 05/27/2022 0.0  0.0 - 0.1 K/UL Final    ABS. IMM. GRANS. 05/27/2022 0.0  0.00 - 0.04 K/UL Final    DF 05/27/2022 AUTOMATED    Final    Hemoglobin A1c 05/27/2022 4.7  4.0 - 5.6 % Final    Comment: NEW METHOD  PLEASE NOTE NEW REFERENCE RANGE  (NOTE)  HbA1C Interpretive Ranges  <5.7              Normal  5.7 - 6.4         Consider Prediabetes  >6.5              Consider Diabetes      Est. average glucose 05/27/2022 88  mg/dL Final         Radiographic Studies:  XR Results (most recent):  Results from Hospital Encounter encounter on 03/23/17    XR CHEST PORT    Narrative  EXAM: Portable CXR.  2359 hours    INDICATION: Chest tightness, shortness of breath, dizziness, sweating and nausea  and feeling faint tonight. The lungs are clear. Heart is normal in size. There is no overt pulmonary edema.   There is no evident pneumothorax, apparent adenopathy or sizable pleural  effusion. Impression  IMPRESSION: No Acute Disease. ZACHARY Results (most recent):  Results from Abstract encounter on 07/06/22    ZACHARY 3D SHAMEKA W MAMMO BI SCREENING INCL CAD     CT Results (most recent):  Results from Hospital Encounter encounter on 06/18/12    CT ABD PELV W CONT    Narrative  **Final Report**      ICD Codes / Adm. Diagnosis: 995.90  780.60 / Systemic inflammatory response  Fever, unspecified  Examination:  CT ABD PELVIS W CON  - [id number] - Jun 19 2012  1:19AM  Accession No:  [id number]  Reason:  pain, fever      REPORT:  INDICATION:  Left lower pain, fever. COMPARISON:  No old study. TECHNIQUE:  Contiguous CT images were obtained of the abdomen with  100 mL  of Optiray 320 contrast.  Contiguous CT images were obtained of the pelvis  with Optiray contrast.  Oral contrast was administered as well. Coronal and  sagittal reformatted images were then obtained. CT ABDOMEN:  The liver, spleen, pancreas, and adrenal glands show a  satisfactory appearance. The kidneys show symmetric function and no  hydronephrosis. The abdomen aorta is normal in caliber. The gallbladder is  mildly distended. The biliary ducts are not dilated. Tiny greater on the  left pleural effusions with very slight basilar atelectasis are present. Lower thoracic degenerative disease and L4 discogenic disease are noted. The small bowel is normal in caliber. The appendix is normal configuration. Tiny mesenteric lymph nodes are present. Fecal stasis of the colon is  noted. CT PELVIS:  The urinary bladder is minimally distended. The uterus and  ovaries are normal in size. A mild amount of fluid is present in the  cul-de-sac. IMPRESSION:  Mild amount of fluid cul-de-sac.                                   Signing/Reading Doctor: Pancho Osei ([id number])  Approved: Pancho Osei ([id number])  06/19/2012     DEXA Results (most recent):  No results found for this or any previous visit. MRI Results (most recent):  Results from [place] encounter on 08/10/22    MRI LUMB SPINE WO CONT    Narrative  INDICATION:  Low back pain    EXAMINATION:  MRI LUMBAR SPINE without CONTRAST    COMPARISON: None    TECHNIQUE: MR imaging of the lumbar spine was performed with sagittal T1, T2,  STIR;  axial T1, T2.  NO CONTRAST ADMINISTERED    FINDINGS:    Mild dextroscoliosis of the lumbar spine. No other significant malalignment. Moderate to severe degenerative disc disease at L3-4 and L4-5. No evidence for  acute fracture. No abnormality of the distal spinal cord. T12-L1: No central spinal canal or neural foraminal stenosis. Mild facet  arthropathy    L1/2: Small disc osteophyte complex without any central spinal canal or neural  foraminal stenosis. Mild facet arthropathy    L2/3: Disc osteophyte complex with minimal central stenosis. Narrowing of the  left subarticular zone without any neural foraminal stenosis. Mild to moderate  facet arthropathy    L3/4: Disc osteophyte complex, facet arthropathy, and ligamentum flavum  hypertrophy causing mild central stenosis. Narrowing of the left subarticular  zone with mild left and no right neural foraminal stenosis    L4/5: Disc osteophyte complex, facet arthropathy, and ligamentum flavum  hypertrophy causing mild to moderate central stenosis. Right subarticular  stenosis with narrowing of the left subarticular zone and mild to moderate right  and mild left neural foraminal stenosis    L5/S1: Small disc bulge causing no central spinal canal or neural foraminal  stenosis. Mild to moderate facet arthropathy. Small T2 hyperintense liver lesion is probably a cyst.    Impression  1. Mild scoliosis of the lumbar spine with mild to moderate L4-5 central  stenosis. Moderate to severe L3-4 and L4-5 degenerative disc disease. 2. Multilevel neural foraminal narrowing as described above. Assessment/Plan:       ICD-10-CM ICD-9-CM    1.  Routine adult health maintenance  Z00.00 V70.0       2. Primary hypertension  B59 411.6 METABOLIC PANEL, COMPREHENSIVE      METABOLIC PANEL, COMPREHENSIVE      3. Rheumatoid arthritis involving multiple sites, unspecified whether rheumatoid factor present ([facility].)  M06.9 714.0       4. Hypercholesterolemia  E78.00 272.0       5. Postmenopausal  Z78.0 V49.81 DEXA BONE DENSITY STUDY AXIAL      6. Vitamin D deficiency  E55.9 268.9 VITAMIN D, 25 HYDROXY      VITAMIN D, 25 HYDROXY                 Follow-up and Dispositions    Return in about 6 months (around 5/29/2023). Healthcare Maintenance:  - Preventive measures are reviewed as per above  - Up to date on routine interventions except as noted above  - Orders placed to update gaps as noted  - Notes: Bone mineral density as ordered, up-to-date on eye examination per patient, repeat labs ordered    Essential Hypertension/Blood Pressure Management:   - Home BP Readings: usual 130/80   - Current Control: optimal   - Target BP: Less than 130/80 mmHg   - Relevant BP Meds:  Key CAD CHF Meds               valsartan (DIOVAN) 320 mg tablet (Taking) TAKE ONE TABLET BY MOUTH DAILY    amLODIPine (NORVASC) 2.5 mg tablet (Taking) TAKE ONE TABLET BY MOUTH DAILY               - Plan: continue current treatment regimen, continue current meds, dietary sodium restriction, regular aerobic exercise   - Notes: CCM, labs pending    Hyperlipidemia/Dyslipidemia:   - Summary of Cardiovascular Risks and Goals:     LDL goal is under 100  hypertension  hyperlipidemia  [place] 10-year risk <10%  History of rheumatoid arthritis   -   Lab Results   Component Value Date/Time    LDL, calculated 115.2 (H) 05/27/2022 08:58 AM    HDL Cholesterol 70 05/27/2022 08:58 AM       - Relevant Cholesterol Meds:  Key Antihyperlipidemia Meds       The patient is on no antihyperlipidemia meds.               - Cholesterol at target: no   - Does patient meet USPSTF and ACC/AHA indications for pharmacotherapy (e.g., statin): yes     - Notes: Consider using a lower target of 7.5% for ASCVD risk given history of rheumatoid arthritis. Continuing with current care for now, continue with dietary modifications. Patient says that she would like to defer pharmacotherapy for the time being. Distraction is reasonable for now, although if this worsens then certainly would initiate therapy for lipid lowering in the future. Rheumatoid Arthritis:   - RX - Enbrel, MTX, PLQ   -Follows with rheumatology, overall asymptomatic, well controlled, continuing with current care, deferring changes to subspecialty advised repeat DEXA scan      I have reviewed the patient's medical history in detail and updated the computerized patient record. We had a prolonged discussion about these complex clinical issues and went over the various important aspects to consider. All questions were answered. Advised the patient to call back or return to office if symptoms do not improve, change in nature, or persist.     The patient was given an after visit summary or informed of [facility] which includes patient instructions, diagnoses, current medications, & vitals. she expressed understanding with the diagnosis and plan. Deepa Davenport MD    Please note that this dictation was completed with [employer]ima, the computer voice recognition software. Quite often unanticipated grammatical, syntax, homophones, and other interpretive errors are inadvertently transcribed by the computer software. Please disregard these errors. Please excuse any errors that have escaped final proofreading.

## 2022-11-29 ENCOUNTER — OFFICE VISIT (OUTPATIENT)
Dept: INTERNAL MEDICINE CLINIC | Age: 67
End: 2022-11-29
Payer: MEDICARE

## 2022-11-29 VITALS
BODY MASS INDEX: 26.2 KG/M2 | RESPIRATION RATE: 20 BRPM | WEIGHT: 176.9 LBS | HEART RATE: 70 BPM | HEIGHT: 69 IN | DIASTOLIC BLOOD PRESSURE: 86 MMHG | SYSTOLIC BLOOD PRESSURE: 140 MMHG | OXYGEN SATURATION: 98 %

## 2022-11-29 DIAGNOSIS — M06.9 RHEUMATOID ARTHRITIS INVOLVING MULTIPLE SITES, UNSPECIFIED WHETHER RHEUMATOID FACTOR PRESENT (HCC): ICD-10-CM

## 2022-11-29 DIAGNOSIS — E78.00 HYPERCHOLESTEROLEMIA: ICD-10-CM

## 2022-11-29 DIAGNOSIS — Z00.00 ROUTINE ADULT HEALTH MAINTENANCE: Primary | ICD-10-CM

## 2022-11-29 DIAGNOSIS — Z78.0 POSTMENOPAUSAL: ICD-10-CM

## 2022-11-29 DIAGNOSIS — E55.9 VITAMIN D DEFICIENCY: ICD-10-CM

## 2022-11-29 DIAGNOSIS — I10 PRIMARY HYPERTENSION: ICD-10-CM

## 2022-11-29 PROCEDURE — 99214 OFFICE O/P EST MOD 30 MIN: CPT | Performed by: INTERNAL MEDICINE

## 2022-11-29 PROCEDURE — 3078F DIAST BP <80 MM HG: CPT | Performed by: INTERNAL MEDICINE

## 2022-11-29 PROCEDURE — 3074F SYST BP LT 130 MM HG: CPT | Performed by: INTERNAL MEDICINE

## 2022-11-29 PROCEDURE — 1123F ACP DISCUSS/DSCN MKR DOCD: CPT | Performed by: INTERNAL MEDICINE

## 2022-11-29 NOTE — PROGRESS NOTES
Chief Complaint   Patient presents with    Follow-up       1. \"Have you been to the ER, urgent care clinic since your last visit? Hospitalized since your last visit? \" No    2. \"Have you seen or consulted any other health care providers outside of the [address] [place] [address] since your last visit? \" No     3. For patients aged 39-70: Has the patient had a colonoscopy / FIT/ Cologuard? No      If the patient is female:    4. For patients aged 41-77: Has the patient had a mammogram within the past 2 years? No      5. For patients aged 21-65: Has the patient had a pap smear?  No

## 2022-11-30 LAB
25(OH)D3 SERPL-MCNC: 17.3 NG/ML (ref 30–100)
ALBUMIN SERPL-MCNC: 4.2 G/DL (ref 3.5–5)
ALBUMIN/GLOB SERPL: 1.2 {RATIO} (ref 1.1–2.2)
ALP SERPL-CCNC: 103 U/L (ref 45–117)
ALT SERPL-CCNC: 34 U/L (ref 12–78)
ANION GAP SERPL CALC-SCNC: 4 MMOL/L (ref 5–15)
AST SERPL-CCNC: 23 U/L (ref 15–37)
BILIRUB SERPL-MCNC: 0.3 MG/DL (ref 0.2–1)
BUN SERPL-MCNC: 19 MG/DL (ref 6–20)
BUN/CREAT SERPL: 33 (ref 12–20)
CALCIUM SERPL-MCNC: 9.9 MG/DL (ref 8.5–10.1)
CHLORIDE SERPL-SCNC: 101 MMOL/L (ref 97–108)
CO2 SERPL-SCNC: 29 MMOL/L (ref 21–32)
COMMENT, HOLDF: NORMAL
CREAT SERPL-MCNC: 0.57 MG/DL (ref 0.55–1.02)
GLOBULIN SER CALC-MCNC: 3.6 G/DL (ref 2–4)
GLUCOSE SERPL-MCNC: 79 MG/DL (ref 65–100)
POTASSIUM SERPL-SCNC: 4.7 MMOL/L (ref 3.5–5.1)
PROT SERPL-MCNC: 7.8 G/DL (ref 6.4–8.2)
SAMPLES BEING HELD,HOLD: NORMAL
SODIUM SERPL-SCNC: 134 MMOL/L (ref 136–145)

## 2022-12-05 NOTE — PROGRESS NOTES
Please call the patient regarding her diagnostic evaluation. Renal function stable, vitamin D level still low. If she is not currently on supplementation, then she should start this. If she is already on supplementation, then we can consider high-dose supplementation with 50,000 units weekly.

## 2022-12-07 NOTE — PROGRESS NOTES
Patient was informed about lab results. Patient had bone density in 2018. Does she needs to do a bone density.

## 2023-03-30 ENCOUNTER — OFFICE VISIT (OUTPATIENT)
Dept: INTERNAL MEDICINE CLINIC | Age: 68
End: 2023-03-30
Payer: MEDICARE

## 2023-03-30 ENCOUNTER — HOSPITAL ENCOUNTER (OUTPATIENT)
Dept: CT IMAGING | Age: 68
Discharge: HOME | End: 2023-03-30
Attending: INTERNAL MEDICINE
Payer: MEDICARE

## 2023-03-30 VITALS
BODY MASS INDEX: 26.29 KG/M2 | TEMPERATURE: 98.8 F | HEART RATE: 82 BPM | RESPIRATION RATE: 20 BRPM | SYSTOLIC BLOOD PRESSURE: 130 MMHG | OXYGEN SATURATION: 98 % | DIASTOLIC BLOOD PRESSURE: 80 MMHG | HEIGHT: 69 IN | WEIGHT: 177.5 LBS

## 2023-03-30 DIAGNOSIS — R09.82 POSTNASAL DRIP: Primary | ICD-10-CM

## 2023-03-30 DIAGNOSIS — J01.10 ACUTE NON-RECURRENT FRONTAL SINUSITIS: ICD-10-CM

## 2023-03-30 DIAGNOSIS — R11.2 NAUSEA AND VOMITING, UNSPECIFIED VOMITING TYPE: ICD-10-CM

## 2023-03-30 DIAGNOSIS — G44.52 NEW DAILY PERSISTENT HEADACHE: ICD-10-CM

## 2023-03-30 DIAGNOSIS — M06.9 RHEUMATOID ARTHRITIS INVOLVING MULTIPLE SITES, UNSPECIFIED WHETHER RHEUMATOID FACTOR PRESENT (HCC): ICD-10-CM

## 2023-03-30 PROCEDURE — G9899 SCRN MAM PERF RSLTS DOC: HCPCS | Performed by: INTERNAL MEDICINE

## 2023-03-30 PROCEDURE — 70450 CT HEAD/BRAIN W/O DYE: CPT

## 2023-03-30 PROCEDURE — G8510 SCR DEP NEG, NO PLAN REQD: HCPCS | Performed by: INTERNAL MEDICINE

## 2023-03-30 PROCEDURE — 3017F COLORECTAL CA SCREEN DOC REV: CPT | Performed by: INTERNAL MEDICINE

## 2023-03-30 PROCEDURE — G8427 DOCREV CUR MEDS BY ELIG CLIN: HCPCS | Performed by: INTERNAL MEDICINE

## 2023-03-30 PROCEDURE — 1090F PRES/ABSN URINE INCON ASSESS: CPT | Performed by: INTERNAL MEDICINE

## 2023-03-30 PROCEDURE — 3079F DIAST BP 80-89 MM HG: CPT | Performed by: INTERNAL MEDICINE

## 2023-03-30 PROCEDURE — G8399 PT W/DXA RESULTS DOCUMENT: HCPCS | Performed by: INTERNAL MEDICINE

## 2023-03-30 PROCEDURE — 1101F PT FALLS ASSESS-DOCD LE1/YR: CPT | Performed by: INTERNAL MEDICINE

## 2023-03-30 PROCEDURE — 1123F ACP DISCUSS/DSCN MKR DOCD: CPT | Performed by: INTERNAL MEDICINE

## 2023-03-30 PROCEDURE — G8417 CALC BMI ABV UP PARAM F/U: HCPCS | Performed by: INTERNAL MEDICINE

## 2023-03-30 PROCEDURE — G8536 NO DOC ELDER MAL SCRN: HCPCS | Performed by: INTERNAL MEDICINE

## 2023-03-30 PROCEDURE — 99214 OFFICE O/P EST MOD 30 MIN: CPT | Performed by: INTERNAL MEDICINE

## 2023-03-30 PROCEDURE — 3075F SYST BP GE 130 - 139MM HG: CPT | Performed by: INTERNAL MEDICINE

## 2023-03-30 RX ORDER — UREA 10 %
2 LOTION (ML) TOPICAL 2 TIMES DAILY
Qty: 28 TABLET | Refills: 0 | Status: SHIPPED | OUTPATIENT
Start: 2023-03-30 | End: 2023-04-06

## 2023-03-30 RX ORDER — PROMETHAZINE HYDROCHLORIDE 50 MG/1
50 TABLET ORAL
Qty: 30 TABLET | Refills: 0 | Status: SHIPPED | OUTPATIENT
Start: 2023-03-30

## 2023-03-30 RX ORDER — IPRATROPIUM BROMIDE 42 UG/1
2 SPRAY, METERED NASAL 4 TIMES DAILY
Qty: 15 ML | Refills: 0 | Status: SHIPPED | OUTPATIENT
Start: 2023-03-30 | End: 2023-04-02

## 2023-03-30 RX ORDER — DOXYCYCLINE 100 MG/1
100 TABLET ORAL 2 TIMES DAILY
Qty: 14 TABLET | Refills: 0 | Status: SHIPPED | OUTPATIENT
Start: 2023-03-30 | End: 2023-04-06

## 2023-03-30 RX ORDER — FLUTICASONE PROPIONATE 50 MCG
2 SPRAY, SUSPENSION (ML) NASAL DAILY
Qty: 1 EACH | Refills: 1 | Status: SHIPPED | OUTPATIENT
Start: 2023-03-30

## 2023-03-30 RX ORDER — SOD CHLOR,SOD BICARB/NETI POT
1 PACKET, WITH RINSE DEVICE NASAL 3 TIMES DAILY
Qty: 50 EACH | Refills: 1 | Status: SHIPPED | OUTPATIENT
Start: 2023-03-30

## 2023-03-30 NOTE — PROGRESS NOTES
Chief Complaint   Patient presents with    Generalized Body Aches     Visit Vitals  /80 (BP 1 Location: Left upper arm, BP Patient Position: Sitting, BP Cuff Size: Large adult)   Pulse 82   Temp 98.8 °F (37.1 °C) (Oral)   Resp 20   Ht 5' 9\" (1.753 m)   Wt 177 lb 8 oz (80.5 kg)   SpO2 98%   BMI 26.21 kg/m²     1. \"Have you been to the ER, urgent care clinic since your last visit? Hospitalized since your last visit? \" No    2. \"Have you seen or consulted any other health care providers outside of the [address] [place] [address] since your last visit? \" No     3. For patients aged 39-70: Has the patient had a colonoscopy / FIT/ Cologuard? No      If the patient is female:    4. For patients aged 41-77: Has the patient had a mammogram within the past 2 years? No      5. For patients aged 21-65: Has the patient had a pap smear?  No

## 2023-03-30 NOTE — PROGRESS NOTES
ICD-10-CM ICD-9-CM    1. Postnasal drip  R09.82 784.91 ipratropium (ATROVENT) 42 mcg (0.06 %) nasal spray      sod bicarb-sod chlor-neti pot (Ayr Saline Nasal Neti Rinse) pkdv      fluticasone propionate (FLONASE) 50 mcg/actuation nasal spray      2. Rheumatoid arthritis involving multiple sites, unspecified whether rheumatoid factor present ([facility]  M06.9 714.0 CBC WITH AUTOMATED DIFF      METABOLIC PANEL, COMPREHENSIVE      METABOLIC PANEL, COMPREHENSIVE      CBC WITH AUTOMATED DIFF      3. Nausea and vomiting, unspecified vomiting type  R11.2 787.01 promethazine (PHENERGAN) 50 mg tablet      4. New daily persistent headache  G44.52 339.42 SED RATE (ESR)      CT HEAD WO CONT      SED RATE (ESR)      5. Acute non-recurrent frontal sinusitis  J01.10 461.1 Lactobacillus Acidoph & Bulgar (FLORANEX) 1 million cell tab tablet      doxycycline (ADOXA) 100 mg tablet               Subjective:     Chief Complaint   Patient presents with    Generalized Body Myrdel Mendoza is a 78 y.o. F.  she  has a past medical history of Former smoker, stopped smoking in distant past, History of abdominal pain, History of echocardiogram (06/2012), History of left heart catheterization (06/2012), History of melanoma, Hypercholesterolemia, Hypertension, Long term current use of immunosuppressive drug, Lumbar degenerative disc disease, Rheumatoid arthritis ([facility] Transaminitis, and Vitamin D deficiency. her last appointment in this clinic was 1/19/2023 . I reviewed and updated the medical record. This is a 31-year-old female with a past medical history of rheumatoid arthritis currently being treated with Enbrel as well as hydroxychloroquine who presents today for an acute complaint of headaches, muscle aches, nausea, and vomiting. She says that she had been in her usual state of health until approximately Sunday.   At that point, she had developed the acute onset of a headache, which she describes as a severe, dull ache along the right side of her forehead that is the worst headache that she has ever had. Over the next few days, the patient has noted that the headache sometimes wakes her from sleep, and that it sometimes is worse when laying flat. This had persisted over the next several hours, and eventually became associated with myalgias, and subjective chills, as well as a low-grade fever to 101 °F.  Although the fever itself has since gotten better, she has continued to have dull myalgias and headaches throughout the next few days, and also over the past 24 to 48 hours has had some intermittent nausea with a single episode of nonbilious, nonbloody emesis. During this time, she has felt significantly fatigued, and her  says that she has had a reduced appetite overall. She denies having had any recent travel, sick contact exposures, antibiotic use, hospitalizations, or other such risk factors. However, she continues on Enbrel at the direction of her rheumatologist for treatment of her rheumatoid arthritis. She denies photophobia. She relates to generalized fatigue. She denies neck stiffness. Review of systems is otherwise negative. Routine Healthcare Maintenance issues are reviewed and discussed with the patient as noted below. Orders to update gaps in healthcare maintenance were placed as noted below in the Assessment and Plan, where applicable. Past Medical History:  Past Medical History:   Diagnosis Date    Former smoker, stopped smoking in distant past     History of abdominal pain     History of echocardiogram 06/2012    Left ventricle: Ejection fraction was estimated to be 65 %. Mitral valve: There was mild regurgitation. Aortic valve: There was no stenosis. Tricuspid valve: There was mild regurgitation    History of left heart catheterization 06/2012    1. Angiographically normal coronary arteries with normal left ventricular function. 2. Mildly elevated left ventricular filling pressures.  No aortic stenosis. Systemic arterial pressures within normal limits. 3. Normal left ventricular function with an estimated ejection fraction of 60% and no significant mitral regurgitation. 4. Angiographically normal coronary arteries in a right dominant system    History of melanoma     Hypercholesterolemia     Hypertension     Long term current use of immunosuppressive drug     Lumbar degenerative disc disease     L3-L4    Rheumatoid arthritis (HCC)     RX = Etanercept, Sulfsalazine, Plaquenil    Transaminitis     Vitamin D deficiency        Past Surgical Histor:  Past Surgical History:   Procedure Laterality Date    COLONOSCOPY N/A 10/1/2021    COLONOSCOPY performed by Augustina Davis MD at [address]; HI RISK IND  10/1/2021         HX GYN       X2    HX MALIGNANT SKIN LESION EXCISION      melanoma x2    HX OTHER SURGICAL      lymph node removed from Right arm benign    HX OTHER SURGICAL  2021    Left upper chest subcutaneous nodule excision       Allergies: Allergies   Allergen Reactions    Shellfish Derived Shortness of Breath    Levaquin [Levofloxacin] Rash    Codeine Rash    Pcn [Penicillins] Rash    Seafood [Shellfish Containing Products] Shortness of Breath       Medications:  Current Outpatient Medications   Medication Sig Dispense Refill    ipratropium (ATROVENT) 42 mcg (0.06 %) nasal spray 2 Sprays by Both Nostrils route four (4) times daily for 3 days. Indications: runny nose 15 mL 0    sod bicarb-sod chlor-neti pot (Ayr Saline Nasal Neti Rinse) pkdv 1 Each by sinus irrigation route three (3) times daily. 50 Each 1    fluticasone propionate (FLONASE) 50 mcg/actuation nasal spray 2 Sprays by Both Nostrils route daily. Indications: chronic stuffy and runny nose not caused by allergies 1 Each 1    promethazine (PHENERGAN) 50 mg tablet Take 1 Tablet by mouth every six (6) hours as needed for Nausea.  30 Tablet 0    Lactobacillus Acidoph & Bulgar (FLORANEX) 1 million cell tab tablet Take 2 Tablets by mouth two (2) times a day for 7 days. 28 Tablet 0    doxycycline (ADOXA) 100 mg tablet Take 1 Tablet by mouth two (2) times a day for 7 days. 14 Tablet 0    amLODIPine (NORVASC) 2.5 mg tablet Take 1 Tablet by mouth daily. 90 Tablet 0    valsartan (DIOVAN) 320 mg tablet Take 1 Tablet by mouth daily. 90 Tablet 0    etanercept (ENBREL) 50 mg/mL (0.98 mL) injection 50 mg by SubCUTAneous route every seven (7) days. Indications: RHEUMATOID ARTHRITIS      sulfaSALAzine (AZULFIDINE) 500 mg tablet Take 500 mg by mouth two (2) times a day. Take two tablets twice daily   Indications: RHEUMATOID ARTHRITIS      hydroxychloroquine (PLAQUINIL) 200 mg tablet Take 200 mg by mouth two (2) times a day. Take one tablet twice daily       acetaminophen (TYLENOL) 500 mg tablet Take 1,000 mg by mouth every six (6) hours as needed.            Social History:  Social History     Socioeconomic History    Marital status:     Number of children: 2   Tobacco Use    Smoking status: Former     Packs/day: 1.00     Years: 1.00     Pack years: 1.00     Types: Cigarettes, Cigars    Smokeless tobacco: Former     Quit date: 1974    Tobacco comments:     quit 1974   Vaping Use    Vaping Use: Never used   Substance and Sexual Activity    Alcohol use: No    Drug use: Not Currently       Family History:  Family History   Problem Relation Age of Onset    Breast Cancer Mother     Heart Disease Father     Dementia Father        Immunizations:  Immunization History   Administered Date(s) Administered    COVID-19, PFIZER PURPLE top, DILUTE for use, (age 15 y+), IM, 30mcg/0.3mL 03/15/2021, 04/05/2021, 08/23/2021    Influenza High Dose Vaccine PF 10/01/2021, 10/01/2022    Influenza Vaccine 10/20/2008, 10/19/2009, 10/31/2013, 11/07/2016, 10/01/2019    Influenza, AFLURIA (age 10-32 mo), IM, MDV, 0.25 mL, Fluzone (age 10 mo+), AFLURIA (age 1 y+), IM, MDV, 0.5mL 10/18/2017    Influenza, FLUARIX, FLULAVAL, FLUZONE (age 10 mo+) AND AFLURIA, (age 1 y+), PF, 0.5mL 10/05/2015    Influenza, FLUBLOK, (age 25 y+), PF 10/18/2018    Novel Influenza-H1N1-09, All Formulations 12/03/2009    Pneumococcal Conjugate (PCV-13) 10/18/2018    Pneumococcal Conjugate PCV20, PF (Prevnar 20) 05/27/2022    Pneumococcal Polysaccharide (PPSV-23) 01/01/2011    Tdap 08/14/2018        Healthcare Maintenance:  Health Maintenance   Topic Date Due    Shingles Vaccine (1 of 2) Never done    COVID-19 Vaccine (5 - Booster for Pfizer series) 09/12/2022    Breast Cancer Screen Mammogram  05/23/2023    Medicare Yearly Exam  05/28/2023    Depression Screen  11/29/2023    Colorectal Cancer Screening Combo  10/01/2026    Lipid Screen  05/27/2027    DTaP/Tdap/Td series (2 - Td or Tdap) 08/14/2028    Hepatitis C Screening  Completed    Bone Densitometry (Dexa) Screening  Completed    Flu Vaccine  Completed    Pneumococcal 65+ years  Completed        Review of Systems:  ROS:  Review of Systems   Constitutional:  Positive for chills, fever and malaise/fatigue. Negative for diaphoresis and weight loss. HENT:  Positive for congestion. Eyes: Negative. Respiratory:  Negative for cough and hemoptysis. Cardiovascular: Negative. Gastrointestinal:  Positive for nausea and vomiting. Negative for abdominal pain, blood in stool, constipation, diarrhea, heartburn and melena. Genitourinary: Negative. Musculoskeletal:  Positive for myalgias. Negative for back pain, falls, joint pain and neck pain. Skin: Negative. Negative for itching and rash. Neurological:  Positive for headaches. Negative for dizziness, tingling, tremors, sensory change, speech change, focal weakness, seizures, loss of consciousness and weakness. Endo/Heme/Allergies: Negative. Psychiatric/Behavioral: Negative.       ROS otherwise negative      Objective:     Vital Signs:  Visit Vitals  /80 (BP 1 Location: Left upper arm, BP Patient Position: Sitting, BP Cuff Size: Large adult)   Pulse 82   Temp 98.8 °F (37.1 °C) (Oral)   Resp 20   Ht 5' 9\" (1.753 m)   Wt 177 lb 8 oz (80.5 kg)   SpO2 98%   BMI 26.21 kg/m²       BMI:  Body mass index is 26.21 kg/m². Physical Examination:  Physical Exam  Constitutional:       Appearance: Normal appearance. She is normal weight. HENT:      Head: Normocephalic and atraumatic. Right Ear: External ear normal.      Left Ear: External ear normal.      Nose: Congestion present. No rhinorrhea. Mouth/Throat:      Mouth: Mucous membranes are moist.      Pharynx: Oropharynx is clear. No oropharyngeal exudate or posterior oropharyngeal erythema. Cardiovascular:      Rate and Rhythm: Normal rate and regular rhythm. Pulses: Normal pulses. Heart sounds: Normal heart sounds. No murmur heard. No friction rub. No gallop. Pulmonary:      Effort: Pulmonary effort is normal. No respiratory distress. Breath sounds: Normal breath sounds. No wheezing, rhonchi or rales. Abdominal:      General: Abdomen is flat. Bowel sounds are normal. There is no distension. Palpations: Abdomen is soft. Tenderness: There is no abdominal tenderness. There is no guarding. Musculoskeletal:         General: No swelling, tenderness or deformity. Normal range of motion. Cervical back: Normal range of motion and neck supple. No rigidity or tenderness. Skin:     General: Skin is warm and dry. Findings: No erythema, lesion or rash. Neurological:      General: No focal deficit present. Mental Status: She is alert and oriented to person, place, and time. Mental status is at baseline. Sensory: No sensory deficit. Motor: No weakness.       Gait: Gait normal.      Deep Tendon Reflexes: Reflexes normal.   Psychiatric:         Mood and Affect: Mood normal.         Behavior: Behavior normal.         Judgment: Judgment normal.        Physical exam otherwise negative    Diagnostic Testing:    Laboratory Studies:  Office Visit on 11/29/2022   Component Date Value Ref Range Status    Sodium 11/29/2022 134 (A)  136 - 145 mmol/L Final    Potassium 11/29/2022 4.7  3.5 - 5.1 mmol/L Final    Chloride 11/29/2022 101  97 - 108 mmol/L Final    CO2 11/29/2022 29  21 - 32 mmol/L Final    Anion gap 11/29/2022 4 (A)  5 - 15 mmol/L Final    Glucose 11/29/2022 79  65 - 100 mg/dL Final    BUN 11/29/2022 19  6 - 20 MG/DL Final    Creatinine 11/29/2022 0.57  0.55 - 1.02 MG/DL Final    BUN/Creatinine ratio 11/29/2022 33 (A)  12 - 20   Final    eGFR 11/29/2022 >60  >60 ml/min/1.73m2 Final    Comment:   Pediatric calculator link: Matthew.[email or web address] org/professionals/kdoqi/gfr_calculatorped    Effective Oct 3, 2022    These results are not intended for use in patients <25years of age. eGFR results are calculated without a race factor using  the 2021 CKD-EPI equation. Careful clinical correlation is recommended, particularly when comparing to results calculated using previous equations. The CKD-EPI equation is less accurate in patients with extremes of muscle mass, extra-renal metabolism of creatinine, excessive creatine ingestion, or following therapy that affects renal tubular secretion. Calcium 11/29/2022 9.9  8.5 - 10.1 MG/DL Final    Bilirubin, total 11/29/2022 0.3  0.2 - 1.0 MG/DL Final    ALT (SGPT) 11/29/2022 34  12 - 78 U/L Final    AST (SGOT) 11/29/2022 23  15 - 37 U/L Final    Alk.  phosphatase 11/29/2022 103  45 - 117 U/L Final    Protein, total 11/29/2022 7.8  6.4 - 8.2 g/dL Final    Albumin 11/29/2022 4.2  3.5 - 5.0 g/dL Final    Globulin 11/29/2022 3.6  2.0 - 4.0 g/dL Final    A-G Ratio 11/29/2022 1.2  1.1 - 2.2   Final    Vitamin D 25-Hydroxy 11/29/2022 17.3 (A)  30 - 100 ng/mL Final    Comment: (NOTE)  Deficiency               <20 ng/mL  Insufficiency          20-30 ng/mL  Sufficient             ng/mL  Possible toxicity       >100 ng/mL    The Method used is Siemens Advia [place]aur currently standardized to a   Center of Disease Control and Prevention (CDC) certified reference   method. Samples containing fluorescein dye can produce falsely   elevated values when tested with the ADVIA Centaur Vitamin D Assay. It is recommended that results in the toxic range, >100 ng/mL, be   retested 72 hours post fluorescein exposure. SAMPLES BEING HELD 11/29/2022 1LAV   Final    COMMENT 11/29/2022 Add-on orders for these samples will be processed based on acceptable specimen integrity and analyte stability, which may vary by analyte. Final         Radiographic Studies:  XR Results (most recent):  Results from Hospital Encounter encounter on 03/23/17    XR CHEST PORT    Narrative  EXAM: Portable CXR.  2359 hours    INDICATION: Chest tightness, shortness of breath, dizziness, sweating and nausea  and feeling faint tonight. The lungs are clear. Heart is normal in size. There is no overt pulmonary edema. There is no evident pneumothorax, apparent adenopathy or sizable pleural  effusion. Impression  IMPRESSION: No Acute Disease. ZACHARY Results (most recent):  Results from Abstract encounter on 07/06/22    ZACHARY 3D SHAMEKA W MAMMO BI SCREENING INCL CAD     CT Results (most recent):  Results from Hospital Encounter encounter on 06/18/12    CT ABD PELV W CONT    Narrative  **Final Report**      ICD Codes / Adm. Diagnosis: 995.90  780.60 / Systemic inflammatory response  Fever, unspecified  Examination:  CT ABD PELVIS W CON  - [id number] - Jun 19 2012  1:19AM  Accession No:  [id number]  Reason:  pain, fever      REPORT:  INDICATION:  Left lower pain, fever. COMPARISON:  No old study. TECHNIQUE:  Contiguous CT images were obtained of the abdomen with  100 mL  of Optiray 320 contrast.  Contiguous CT images were obtained of the pelvis  with Optiray contrast.  Oral contrast was administered as well. Coronal and  sagittal reformatted images were then obtained. CT ABDOMEN:  The liver, spleen, pancreas, and adrenal glands show a  satisfactory appearance.   The kidneys show symmetric function and no  hydronephrosis. The abdomen aorta is normal in caliber. The gallbladder is  mildly distended. The biliary ducts are not dilated. Tiny greater on the  left pleural effusions with very slight basilar atelectasis are present. Lower thoracic degenerative disease and L4 discogenic disease are noted. The small bowel is normal in caliber. The appendix is normal configuration. Tiny mesenteric lymph nodes are present. Fecal stasis of the colon is  noted. CT PELVIS:  The urinary bladder is minimally distended. The uterus and  ovaries are normal in size. A mild amount of fluid is present in the  cul-de-sac. IMPRESSION:  Mild amount of fluid cul-de-sac. Signing/Reading Doctor: Cara Mae ([id number])  Approved: Cara Mae ([id number])  06/19/2012     DEXA Results (most recent):  Results from [place] encounter on 01/19/23    DEXA BONE DENSITY STUDY AXIAL    Narrative  Bone Mineral Density    Indication: Postmenopausal  Age: 78  Sex: Female. Menopause status: Postmenopausal.  Hormone replacement therapy: No    Number of falls in the past year: 1  Risk factors for osteoporosis: History of rheumatoid arthritis    Current medication for osteoporosis: None. Comparison: None. Technique: Imaging was performed on the [employer].   World Health Organization  meta-analysis fracture risk calculator (FRAX) analysis was performed for 10 year  fracture risk probability assessment    Excluded sites: L3 and L4 due to spondylosis    Findings:      Femoral Neck: Right  Bone mineral density (gm/cm2): 0.987  % of peak bone mass: 95  % for age matched controls: 114  T-score: -0.4  Z-score: 0.9    Total Hip: Right  Bone mineral density (gm/cm2): 1.032  % of peak bone mass: 102  % for age matched controls: 116  T-score: 0.2  Z-score: 1.1    Lumbar Spine: L1-L2  Bone mineral density (gm/cm2): 1.248  % of peak bone mass: 106  % for age matched controls: 120  T-score: 0.6  Z-score: 1.7    33% Radius:  Left  Bone mineral density (gm/cm2):  0.751  % of peak bone mass:  85  % for age matched controls:  99  T-score:  -1.5  Z-score:  -0.1    Impression  Impression: This patient is osteopenic using the World Health Organization criteria  10 year probability of major osteoporotic fracture: 7.3%  10 year probability of hip fracture: 0.3%    Recommendations:  Therapy recommendations need to be tailored to each individual patient. Using  the [address]) FRAX absolute fracture algorithm, the  [address] [place] [address] recommends beginning pharmacological therapy in  postmenopausal women and men over the age of 48 with a 8 year probability of a  hip fracture of >3% OR with the 10 year probability of a major osteoporotic  fracture of >20%. Please reconsider testing based on risk factors. Currently, Medicare will only  reimburse for a central DXA examination every two years, unless the patient is  on chronic glucocorticoid therapy. Note: Please note that reliable, valid comparisons cannot be made between  studies which have been performed on machines from different manufacturers. If  clinically warranted, a follow up study performed at this site, on the same  unit, would allow the most sensitive assessment of change in bone mineral  density. MRI Results (most recent):  Results from [place] encounter on 08/10/22    MRI LUMB SPINE WO CONT    Narrative  INDICATION:  Low back pain    EXAMINATION:  MRI LUMBAR SPINE without CONTRAST    COMPARISON: None    TECHNIQUE: MR imaging of the lumbar spine was performed with sagittal T1, T2,  STIR;  axial T1, T2.  NO CONTRAST ADMINISTERED    FINDINGS:    Mild dextroscoliosis of the lumbar spine. No other significant malalignment. Moderate to severe degenerative disc disease at L3-4 and L4-5. No evidence for  acute fracture. No abnormality of the distal spinal cord.     T12-L1: No central spinal canal or neural foraminal stenosis. Mild facet  arthropathy    L1/2: Small disc osteophyte complex without any central spinal canal or neural  foraminal stenosis. Mild facet arthropathy    L2/3: Disc osteophyte complex with minimal central stenosis. Narrowing of the  left subarticular zone without any neural foraminal stenosis. Mild to moderate  facet arthropathy    L3/4: Disc osteophyte complex, facet arthropathy, and ligamentum flavum  hypertrophy causing mild central stenosis. Narrowing of the left subarticular  zone with mild left and no right neural foraminal stenosis    L4/5: Disc osteophyte complex, facet arthropathy, and ligamentum flavum  hypertrophy causing mild to moderate central stenosis. Right subarticular  stenosis with narrowing of the left subarticular zone and mild to moderate right  and mild left neural foraminal stenosis    L5/S1: Small disc bulge causing no central spinal canal or neural foraminal  stenosis. Mild to moderate facet arthropathy. Small T2 hyperintense liver lesion is probably a cyst.    Impression  1. Mild scoliosis of the lumbar spine with mild to moderate L4-5 central  stenosis. Moderate to severe L3-4 and L4-5 degenerative disc disease. 2. Multilevel neural foraminal narrowing as described above. Assessment/Plan:       ICD-10-CM ICD-9-CM    1. Postnasal drip  R09.82 784.91 ipratropium (ATROVENT) 42 mcg (0.06 %) nasal spray      sod bicarb-sod chlor-neti pot ([place]yr Saline Nasal Neti Rinse) pkdv      fluticasone propionate (FLONASE) 50 mcg/actuation nasal spray      2. Rheumatoid arthritis involving multiple sites, unspecified whether rheumatoid factor present ([facility]  M06.9 714.0 CBC WITH AUTOMATED DIFF      METABOLIC PANEL, COMPREHENSIVE      METABOLIC PANEL, COMPREHENSIVE      CBC WITH AUTOMATED DIFF      3. Nausea and vomiting, unspecified vomiting type  R11.2 787.01 promethazine (PHENERGAN) 50 mg tablet      4.  New daily persistent headache  G44.52 339.42 SED RATE (ESR) CT HEAD WO CONT      SED RATE (ESR)      5. Acute non-recurrent frontal sinusitis  J01.10 461.1 Lactobacillus Acidoph & Bulgar (FLORANEX) 1 million cell tab tablet      doxycycline (ADOXA) 100 mg tablet             This patient presents with headache, subjective fevers, myalgias, and nausea. He describes the headache as the worst in her life, and although she had noticed this improving previously, she experiences a recurrence of the headache today while in clinic. She describes once again as the worst headache in her life. However, she has a nonfocal neurological examination today, and her physical examination is otherwise unremarkable. A stat head CT is ordered to further evaluate this. The differential for her symptoms in the context of her ongoing immunosuppression is quite broad. Although I favor a viral source for symptoms, need to consider potential bacterial acute sinusitis. A space-occupying lesion in her brain would also be a consideration, given her history of etanercept use. I would be doubtful of coccidiomycosis or cryptococcus. I would be doubtful of an intracranial hemorrhage. Notably, the patient has had no nuchal rigidity. For now, because of the concern for potential sinusitis, in light of the patient's immunocompromise, checking laboratory studies as noted above to include ESR, but also providing with empiric antibiotic therapy with doxycycline; patient has reported history of allergy to penicillin and fluoroquinolones. Consider MRI if these initial evaluations should be unremarkable, with low threshold to refer patient onward to ER for expedited evaluation if needed. I have reviewed the patient's medical history in detail and updated the computerized patient record. We had a prolonged discussion about these complex clinical issues and went over the various important aspects to consider. All questions were answered.       Advised the patient to call back or return to office if symptoms do not improve, change in nature, or persist.     The patient was given an after visit summary or informed of [employer] Access which includes patient instructions, diagnoses, current medications, & vitals. she expressed understanding with the diagnosis and plan. Day Tyson MD    Please note that this dictation was completed with [employer], the computer voice recognition software. Quite often unanticipated grammatical, syntax, homophones, and other interpretive errors are inadvertently transcribed by the computer software. Please disregard these errors. Please excuse any errors that have escaped final proofreading.

## 2023-03-31 LAB
ALBUMIN SERPL-MCNC: 4.4 G/DL (ref 3.5–5)
ALBUMIN/GLOB SERPL: 1.3 (ref 1.1–2.2)
ALP SERPL-CCNC: 74 U/L (ref 45–117)
ALT SERPL-CCNC: 28 U/L (ref 12–78)
ANION GAP SERPL CALC-SCNC: 5 MMOL/L (ref 5–15)
AST SERPL-CCNC: 23 U/L (ref 15–37)
BASOPHILS # BLD: 0.1 K/UL (ref 0–0.1)
BASOPHILS NFR BLD: 1 % (ref 0–1)
BILIRUB SERPL-MCNC: 0.3 MG/DL (ref 0.2–1)
BUN SERPL-MCNC: 19 MG/DL (ref 6–20)
BUN/CREAT SERPL: 37 (ref 12–20)
CALCIUM SERPL-MCNC: 10.6 MG/DL (ref 8.5–10.1)
CHLORIDE SERPL-SCNC: 94 MMOL/L (ref 97–108)
CO2 SERPL-SCNC: 30 MMOL/L (ref 21–32)
CREAT SERPL-MCNC: 0.52 MG/DL (ref 0.55–1.02)
DIFFERENTIAL METHOD BLD: ABNORMAL
EOSINOPHIL # BLD: 0 K/UL (ref 0–0.4)
EOSINOPHIL NFR BLD: 0 % (ref 0–7)
ERYTHROCYTE [DISTWIDTH] IN BLOOD BY AUTOMATED COUNT: 11.9 % (ref 11.5–14.5)
ERYTHROCYTE [SEDIMENTATION RATE] IN BLOOD: 6 MM/HR (ref 0–30)
GLOBULIN SER CALC-MCNC: 3.5 G/DL (ref 2–4)
GLUCOSE SERPL-MCNC: 112 MG/DL (ref 65–100)
HCT VFR BLD AUTO: 43 % (ref 35–47)
HGB BLD-MCNC: 13.4 G/DL (ref 11.5–16)
IMM GRANULOCYTES # BLD AUTO: 0 K/UL (ref 0–0.04)
IMM GRANULOCYTES NFR BLD AUTO: 0 % (ref 0–0.5)
LYMPHOCYTES # BLD: 0.7 K/UL (ref 0.8–3.5)
LYMPHOCYTES NFR BLD: 11 % (ref 12–49)
MCH RBC QN AUTO: 29.5 PG (ref 26–34)
MCHC RBC AUTO-ENTMCNC: 31.2 G/DL (ref 30–36.5)
MCV RBC AUTO: 94.7 FL (ref 80–99)
MONOCYTES # BLD: 0.7 K/UL (ref 0–1)
MONOCYTES NFR BLD: 11 % (ref 5–13)
NEUTS SEG # BLD: 4.5 K/UL (ref 1.8–8)
NEUTS SEG NFR BLD: 77 % (ref 32–75)
NRBC # BLD: 0 K/UL (ref 0–0.01)
NRBC BLD-RTO: 0 PER 100 WBC
PLATELET # BLD AUTO: 233 K/UL (ref 150–400)
PMV BLD AUTO: 9.3 FL (ref 8.9–12.9)
POTASSIUM SERPL-SCNC: 4.4 MMOL/L (ref 3.5–5.1)
PROT SERPL-MCNC: 7.9 G/DL (ref 6.4–8.2)
RBC # BLD AUTO: 4.54 M/UL (ref 3.8–5.2)
RBC MORPH BLD: ABNORMAL
SODIUM SERPL-SCNC: 129 MMOL/L (ref 136–145)
WBC # BLD AUTO: 6 K/UL (ref 3.6–11)

## 2023-04-01 NOTE — PROGRESS NOTES
Notify patient. CT head shows no abnormality.  Consider MRI if persistent headache but await other diagnostic eval and clinical response

## 2023-04-03 DIAGNOSIS — E87.1 HYPONATREMIA: Primary | ICD-10-CM

## 2023-04-04 ENCOUNTER — OFFICE VISIT (OUTPATIENT)
Dept: INTERNAL MEDICINE CLINIC | Age: 68
End: 2023-04-04
Payer: MEDICARE

## 2023-04-04 LAB
ANION GAP SERPL CALC-SCNC: 4 MMOL/L (ref 5–15)
APPEARANCE UR: ABNORMAL
BILIRUB UR QL: NEGATIVE
BUN SERPL-MCNC: 17 MG/DL (ref 6–20)
BUN/CREAT SERPL: 30 (ref 12–20)
CALCIUM SERPL-MCNC: 10.2 MG/DL (ref 8.5–10.1)
CHLORIDE SERPL-SCNC: 94 MMOL/L (ref 97–108)
CO2 SERPL-SCNC: 32 MMOL/L (ref 21–32)
COLOR UR: ABNORMAL
CREAT SERPL-MCNC: 0.57 MG/DL (ref 0.55–1.02)
GLUCOSE SERPL-MCNC: 89 MG/DL (ref 65–100)
GLUCOSE UR STRIP.AUTO-MCNC: NEGATIVE MG/DL
HGB UR QL STRIP: NEGATIVE
KETONES UR QL STRIP.AUTO: NEGATIVE MG/DL
LEUKOCYTE ESTERASE UR QL STRIP.AUTO: ABNORMAL
NITRITE UR QL STRIP.AUTO: POSITIVE
PH UR STRIP: 6 (ref 5–8)
POTASSIUM SERPL-SCNC: 3.8 MMOL/L (ref 3.5–5.1)
PROT UR STRIP-MCNC: NEGATIVE MG/DL
SODIUM SERPL-SCNC: 130 MMOL/L (ref 136–145)
SP GR UR REFRACTOMETRY: 1.02 (ref 1–1.03)
UROBILINOGEN UR QL STRIP.AUTO: 0.2 EU/DL (ref 0.2–1)

## 2023-04-04 PROCEDURE — G9899 SCRN MAM PERF RSLTS DOC: HCPCS | Performed by: INTERNAL MEDICINE

## 2023-04-04 PROCEDURE — G8427 DOCREV CUR MEDS BY ELIG CLIN: HCPCS | Performed by: INTERNAL MEDICINE

## 2023-04-04 PROCEDURE — 3017F COLORECTAL CA SCREEN DOC REV: CPT | Performed by: INTERNAL MEDICINE

## 2023-04-04 PROCEDURE — 1090F PRES/ABSN URINE INCON ASSESS: CPT | Performed by: INTERNAL MEDICINE

## 2023-04-04 PROCEDURE — G8536 NO DOC ELDER MAL SCRN: HCPCS | Performed by: INTERNAL MEDICINE

## 2023-04-04 PROCEDURE — G8399 PT W/DXA RESULTS DOCUMENT: HCPCS | Performed by: INTERNAL MEDICINE

## 2023-04-04 PROCEDURE — 1123F ACP DISCUSS/DSCN MKR DOCD: CPT | Performed by: INTERNAL MEDICINE

## 2023-04-04 PROCEDURE — 1101F PT FALLS ASSESS-DOCD LE1/YR: CPT | Performed by: INTERNAL MEDICINE

## 2023-04-04 PROCEDURE — G8417 CALC BMI ABV UP PARAM F/U: HCPCS | Performed by: INTERNAL MEDICINE

## 2023-04-04 PROCEDURE — 3078F DIAST BP <80 MM HG: CPT | Performed by: INTERNAL MEDICINE

## 2023-04-04 PROCEDURE — 99214 OFFICE O/P EST MOD 30 MIN: CPT | Performed by: INTERNAL MEDICINE

## 2023-04-04 PROCEDURE — 3075F SYST BP GE 130 - 139MM HG: CPT | Performed by: INTERNAL MEDICINE

## 2023-04-04 PROCEDURE — G8510 SCR DEP NEG, NO PLAN REQD: HCPCS | Performed by: INTERNAL MEDICINE

## 2023-04-04 NOTE — PROGRESS NOTES
Chief Complaint   Patient presents with    Rash   Visit Vitals  /78 (BP 1 Location: Right arm, BP Patient Position: Sitting, BP Cuff Size: Adult)   Pulse 72   Temp 98.6 °F (37 °C) (Oral)   Resp 20   Ht 5' 9\" (1.753 m)   Wt 176 lb 8 oz (80.1 kg)   SpO2 99%   BMI 26.06 kg/m²         1. \"Have you been to the ER, urgent care clinic since your last visit? Hospitalized since your last visit? \" No    2. \"Have you seen or consulted any other health care providers outside of the [address] [place] [address] since your last visit? \" No     3. For patients aged 39-70: Has the patient had a colonoscopy / FIT/ Cologuard? No      If the patient is female:    4. For patients aged 41-77: Has the patient had a mammogram within the past 2 years? No      5. For patients aged 21-65: Has the patient had a pap smear?  No

## 2023-04-04 NOTE — PROGRESS NOTES
ICD-10-CM ICD-9-CM    1. Preoperative clearance  Z01.818 V72.84 XR SPINE CERV FLEX/EXT MAX 3 V      2. Postnasal drip  R09.82 784.91       3. Acute non-recurrent frontal sinusitis  J01.10 461.1       4. Primary hypertension  I10 401.9       5. Dysuria  R30.0 788. 1 URINALYSIS W/ RFLX MICROSCOPIC      CULTURE, URINE      6. Hyponatremia  E87.1 276.1       7. Rheumatoid arthritis involving multiple sites, unspecified whether rheumatoid factor present ([facility]  M06.9 714.0 XR SPINE CERV FLEX/EXT MAX 3 V               Subjective:     Chief Complaint   Patient presents with    Rash       Samantha Brian is a 78 y.o. F.  she  has a past medical history of Former smoker, stopped smoking in distant past, History of abdominal pain, History of echocardiogram (06/2012), History of left heart catheterization (06/2012), History of melanoma, Hypercholesterolemia, Hypertension, Long term current use of immunosuppressive drug, Lumbar degenerative disc disease, Rheumatoid arthritis ([facility] Transaminitis, and Vitamin D deficiency. her last appointment in this clinic was 3/30/2023 . I reviewed and updated the medical record. This patient comes back to clinic to follow-up of her recent sinus infection. Previously, the patient had come in with complaint of nausea, vomiting, headache, and worsening sinus pressure. She had been treated empirically with doxycycline given her history of ongoing immunosuppression with etanercept for her history of rheumatoid arthritis, and laboratory studies had been obtained. These had demonstrated hyponatremia, with a sodium of 129. Noncontrasted head CT had been unremarkable. Today, the patient reports feeling generally better. She did develop a small rash on her groin over the past couple of days, but this has generally gotten better. The nausea that she had been having before has basically completely resolved on its own.   In addition, the headache that she has been having before is also gone away. She otherwise has been tolerating her antibiotic regimen without issues, including any diarrhea. Of note, she is using probiotics without problems at this time. She has had some burning with urination from time to time, that has developed over the past 24 to 48 hours. No hematuria, flank pain, or other constitutional systemic complaints. The patient notes that she will be undergoing a total knee replacement in the coming weeks. She denies having had any recent chest pain, shortness of breath, or any further cardiopulmonary symptoms. Although she has recently been dealing with the upper respiratory tract/sinus infection as noted above, she is otherwise typically quite active, and is able to walk up 2 flights of stairs without cardiopulmonary limitation. She notes that she has otherwise tolerated surgeries in the past without issues. No recent history of any bleeding or bruising sequelae, no history of lung disease; she is a former smoker who quit long ago. No history of thyroid disease or other endocrinopathy. He does not snore or have daytime fatigue. She continues on etanercept as noted but notes that her rheumatologist has advised her to discontinue this prior to the surgery. Review of systems otherwise negative. Routine Healthcare Maintenance issues are reviewed and discussed with the patient as noted below. Orders to update gaps in healthcare maintenance were placed as noted below in the Assessment and Plan, where applicable. Past Medical History:  Past Medical History:   Diagnosis Date    Former smoker, stopped smoking in distant past     History of abdominal pain     History of echocardiogram 06/2012    Left ventricle: Ejection fraction was estimated to be 65 %. Mitral valve: There was mild regurgitation. Aortic valve: There was no stenosis. Tricuspid valve: There was mild regurgitation    History of left heart catheterization 06/2012    1.  Angiographically normal coronary arteries with normal left ventricular function. 2. Mildly elevated left ventricular filling pressures. No aortic stenosis. Systemic arterial pressures within normal limits. 3. Normal left ventricular function with an estimated ejection fraction of 60% and no significant mitral regurgitation. 4. Angiographically normal coronary arteries in a right dominant system    History of melanoma     Hypercholesterolemia     Hypertension     Long term current use of immunosuppressive drug     Lumbar degenerative disc disease     L3-L4    Rheumatoid arthritis (HCC)     RX = Etanercept, Sulfsalazine, Plaquenil    Transaminitis     Vitamin D deficiency        Past Surgical Histor:  Past Surgical History:   Procedure Laterality Date    COLONOSCOPY N/A 10/1/2021    COLONOSCOPY performed by Rajan Rueda MD at [address]; HI RISK IND  10/1/2021         HX GYN       X2    HX MALIGNANT SKIN LESION EXCISION      melanoma x2    HX OTHER SURGICAL      lymph node removed from Right arm benign    HX OTHER SURGICAL  2021    Left upper chest subcutaneous nodule excision       Allergies: Allergies   Allergen Reactions    Shellfish Derived Shortness of Breath    Levaquin [Levofloxacin] Rash    Codeine Rash    Pcn [Penicillins] Rash    Seafood [Shellfish Containing Products] Shortness of Breath       Medications:  Current Outpatient Medications   Medication Sig Dispense Refill    sod bicarb-sod chlor-neti pot (Ayr Saline Nasal Neti Rinse) pkdv 1 Each by sinus irrigation route three (3) times daily. 50 Each 1    fluticasone propionate (FLONASE) 50 mcg/actuation nasal spray 2 Sprays by Both Nostrils route daily. Indications: chronic stuffy and runny nose not caused by allergies 1 Each 1    Lactobacillus Acidoph & Bulgar (FLORANEX) 1 million cell tab tablet Take 2 Tablets by mouth two (2) times a day for 7 days.  28 Tablet 0    doxycycline (ADOXA) 100 mg tablet Take 1 Tablet by mouth two (2) times a day for 7 days. 14 Tablet 0    amLODIPine (NORVASC) 2.5 mg tablet Take 1 Tablet by mouth daily. 90 Tablet 0    valsartan (DIOVAN) 320 mg tablet Take 1 Tablet by mouth daily. 90 Tablet 0    etanercept (ENBREL) 50 mg/mL (0.98 mL) injection 50 mg by SubCUTAneous route every seven (7) days. Indications: RHEUMATOID ARTHRITIS      sulfaSALAzine (AZULFIDINE) 500 mg tablet Take 500 mg by mouth two (2) times a day. Take two tablets twice daily   Indications: RHEUMATOID ARTHRITIS      hydroxychloroquine (PLAQUINIL) 200 mg tablet Take 200 mg by mouth two (2) times a day. Take one tablet twice daily       acetaminophen (TYLENOL) 500 mg tablet Take 1,000 mg by mouth every six (6) hours as needed.            Social History:  Social History     Socioeconomic History    Marital status:     Number of children: 2   Tobacco Use    Smoking status: Former     Packs/day: 1.00     Years: 1.00     Pack years: 1.00     Types: Cigarettes, Cigars    Smokeless tobacco: Former     Quit date: 1974    Tobacco comments:     quit 1974   Vaping Use    Vaping Use: Never used   Substance and Sexual Activity    Alcohol use: No    Drug use: Not Currently       Family History:  Family History   Problem Relation Age of Onset    Breast Cancer Mother     Heart Disease Father     Dementia Father        Immunizations:  Immunization History   Administered Date(s) Administered    COVID-19, PFIZER PURPLE top, DILUTE for use, (age 15 y+), IM, 30mcg/0.3mL 03/15/2021, 04/05/2021, 08/23/2021    Influenza High Dose Vaccine PF 10/01/2021, 10/01/2022    Influenza Vaccine 10/20/2008, 10/19/2009, 10/31/2013, 11/07/2016, 10/01/2019    Influenza, AFLURIA (age 11-32 mo), IM, MDV, 0.25 mL, Fluzone (age 10 mo+), AFLURIA (age 1 y+), IM, MDV, 0.5mL 10/18/2017    Influenza, FLUARIX, FLULAVAL, FLUZONE (age 10 mo+) AND AFLURIA, (age 1 y+), PF, 0.5mL 10/05/2015    Influenza, FLUBLOK, (age 25 y+), PF 10/18/2018    Novel Influenza-H1N1-09, All Formulations 12/03/2009 Pneumococcal Conjugate (PCV-13) 10/18/2018    Pneumococcal Conjugate PCV20, PF (Prevnar 20) 05/27/2022    Pneumococcal Polysaccharide (PPSV-23) 01/01/2011    Tdap 08/14/2018        Healthcare Maintenance:  Health Maintenance   Topic Date Due    Shingles Vaccine (1 of 2) Never done    COVID-19 Vaccine (5 - Booster for Pfizer series) 09/12/2022    Breast Cancer Screen Mammogram  05/23/2023    Medicare Yearly Exam  05/28/2023    Depression Screen  04/04/2024    Colorectal Cancer Screening Combo  10/01/2026    Lipid Screen  05/27/2027    DTaP/Tdap/Td series (2 - Td or Tdap) 08/14/2028    Hepatitis C Screening  Completed    Bone Densitometry (Dexa) Screening  Completed    Flu Vaccine  Completed    Pneumococcal 65+ years  Completed        Review of Systems:  ROS:  Review of Systems   Constitutional: Negative. HENT: Negative. Eyes: Negative. Respiratory: Negative. Cardiovascular: Negative. Gastrointestinal: Negative. Genitourinary:  Positive for dysuria. Musculoskeletal: Negative. Skin: Negative. Neurological: Negative. Endo/Heme/Allergies: Negative. Psychiatric/Behavioral: Negative. ROS otherwise negative      Objective:     Vital Signs:  Visit Vitals  /78 (BP 1 Location: Right arm, BP Patient Position: Sitting, BP Cuff Size: Adult)   Pulse 72   Temp 98.6 °F (37 °C) (Oral)   Resp 20   Ht 5' 9\" (1.753 m)   Wt 176 lb 8 oz (80.1 kg)   SpO2 99%   BMI 26.06 kg/m²       BMI:  Body mass index is 26.06 kg/m². Physical Examination:  Physical Exam  Constitutional:       Appearance: Normal appearance. She is normal weight. HENT:      Head: Normocephalic and atraumatic. Right Ear: External ear normal.      Left Ear: External ear normal.      Nose: Nose normal.      Mouth/Throat:      Mouth: Mucous membranes are moist.      Pharynx: Oropharynx is clear. No oropharyngeal exudate or posterior oropharyngeal erythema.    Cardiovascular:      Rate and Rhythm: Normal rate and regular rhythm. Pulses: Normal pulses. Heart sounds: Normal heart sounds. No murmur heard. No friction rub. No gallop. Pulmonary:      Effort: Pulmonary effort is normal. No respiratory distress. Breath sounds: Normal breath sounds. No wheezing, rhonchi or rales. Abdominal:      General: Abdomen is flat. Bowel sounds are normal. There is no distension. Palpations: Abdomen is soft. Tenderness: There is no abdominal tenderness. There is no guarding. Musculoskeletal:         General: No swelling, tenderness or deformity. Normal range of motion. Cervical back: Normal range of motion and neck supple. No rigidity or tenderness. Skin:     General: Skin is warm and dry. Findings: No erythema, lesion or rash. Neurological:      General: No focal deficit present. Mental Status: She is alert and oriented to person, place, and time. Mental status is at baseline. Sensory: No sensory deficit. Motor: No weakness.       Gait: Gait normal.      Deep Tendon Reflexes: Reflexes normal.   Psychiatric:         Mood and Affect: Mood normal.         Behavior: Behavior normal.         Judgment: Judgment normal.        Physical exam otherwise negative    Diagnostic Testing:    Laboratory Studies:  Office Visit on 03/30/2023   Component Date Value Ref Range Status    Sed rate, automated 03/30/2023 6  0 - 30 mm/hr Final    Sodium 03/30/2023 129 (A)  136 - 145 mmol/L Final    Potassium 03/30/2023 4.4  3.5 - 5.1 mmol/L Final    Chloride 03/30/2023 94 (A)  97 - 108 mmol/L Final    CO2 03/30/2023 30  21 - 32 mmol/L Final    Anion gap 03/30/2023 5  5 - 15 mmol/L Final    Glucose 03/30/2023 112 (A)  65 - 100 mg/dL Final    BUN 03/30/2023 19  6 - 20 MG/DL Final    Creatinine 03/30/2023 0.52 (A)  0.55 - 1.02 MG/DL Final    BUN/Creatinine ratio 03/30/2023 37 (A)  12 - 20   Final    eGFR 03/30/2023 >60  >60 ml/min/1.73m2 Final    Comment:   Pediatric calculator link: [email or web address]/gfr_[email or web address]    These results are not intended for use in patients <25years of age. eGFR results are calculated without a race factor using  the 2021 CKD-EPI equation. Careful clinical correlation is recommended, particularly when comparing to results calculated using previous equations. The CKD-EPI equation is less accurate in patients with extremes of muscle mass, extra-renal metabolism of creatinine, excessive creatine ingestion, or following therapy that affects renal tubular secretion. Calcium 03/30/2023 10.6 (A)  8.5 - 10.1 MG/DL Final    Bilirubin, total 03/30/2023 0.3  0.2 - 1.0 MG/DL Final    ALT (SGPT) 03/30/2023 28  12 - 78 U/L Final    AST (SGOT) 03/30/2023 23  15 - 37 U/L Final    Alk. phosphatase 03/30/2023 74  45 - 117 U/L Final    Protein, total 03/30/2023 7.9  6.4 - 8.2 g/dL Final    Albumin 03/30/2023 4.4  3.5 - 5.0 g/dL Final    Globulin 03/30/2023 3.5  2.0 - 4.0 g/dL Final    A-G Ratio 03/30/2023 1.3  1.1 - 2.2   Final    WBC 03/30/2023 6.0  3.6 - 11.0 K/uL Final    RBC 03/30/2023 4.54  3.80 - 5.20 M/uL Final    HGB 03/30/2023 13.4  11.5 - 16.0 g/dL Final    HCT 03/30/2023 43.0  35.0 - 47.0 % Final    MCV 03/30/2023 94.7  80.0 - 99.0 FL Final    MCH 03/30/2023 29.5  26.0 - 34.0 PG Final    MCHC 03/30/2023 31.2  30.0 - 36.5 g/dL Final    RDW 03/30/2023 11.9  11.5 - 14.5 % Final    PLATELET 05/05/1847 516  150 - 400 K/uL Final    MPV 03/30/2023 9.3  8.9 - 12.9 FL Final    NRBC 03/30/2023 0.0  0  WBC Final    ABSOLUTE NRBC 03/30/2023 0.00  0.00 - 0.01 K/uL Final    NEUTROPHILS 03/30/2023 77 (A)  32 - 75 % Final    LYMPHOCYTES 03/30/2023 11 (A)  12 - 49 % Final    MONOCYTES 03/30/2023 11  5 - 13 % Final    EOSINOPHILS 03/30/2023 0  0 - 7 % Final    BASOPHILS 03/30/2023 1  0 - 1 % Final    IMMATURE GRANULOCYTES 03/30/2023 0  0.0 - 0.5 % Final    ABS. NEUTROPHILS 03/30/2023 4.5  1.8 - 8.0 K/UL Final    ABS.  LYMPHOCYTES 03/30/2023 0.7 (A) 0.8 - 3.5 K/UL Final    ABS. MONOCYTES 03/30/2023 0.7  0.0 - 1.0 K/UL Final    ABS. EOSINOPHILS 03/30/2023 0.0  0.0 - 0.4 K/UL Final    ABS. BASOPHILS 03/30/2023 0.1  0.0 - 0.1 K/UL Final    ABS. IMM. GRANS. 03/30/2023 0.0  0.00 - 0.04 K/UL Final    DF 03/30/2023 SMEAR SCANNED    Final    RBC COMMENTS 03/30/2023 NORMOCYTIC, NORMOCHROMIC    Final   Office Visit on 11/29/2022   Component Date Value Ref Range Status    Sodium 11/29/2022 134 (A)  136 - 145 mmol/L Final    Potassium 11/29/2022 4.7  3.5 - 5.1 mmol/L Final    Chloride 11/29/2022 101  97 - 108 mmol/L Final    CO2 11/29/2022 29  21 - 32 mmol/L Final    Anion gap 11/29/2022 4 (A)  5 - 15 mmol/L Final    Glucose 11/29/2022 79  65 - 100 mg/dL Final    BUN 11/29/2022 19  6 - 20 MG/DL Final    Creatinine 11/29/2022 0.57  0.55 - 1.02 MG/DL Final    BUN/Creatinine ratio 11/29/2022 33 (A)  12 - 20   Final    eGFR 11/29/2022 >60  >60 ml/min/1.73m2 Final    Comment:   Pediatric calculator link: [email or web address]/gfr_calculatorped    Effective Oct 3, 2022    These results are not intended for use in patients <25years of age. eGFR results are calculated without a race factor using  the 2021 CKD-EPI equation. Careful clinical correlation is recommended, particularly when comparing to results calculated using previous equations. The CKD-EPI equation is less accurate in patients with extremes of muscle mass, extra-renal metabolism of creatinine, excessive creatine ingestion, or following therapy that affects renal tubular secretion. Calcium 11/29/2022 9.9  8.5 - 10.1 MG/DL Final    Bilirubin, total 11/29/2022 0.3  0.2 - 1.0 MG/DL Final    ALT (SGPT) 11/29/2022 34  12 - 78 U/L Final    AST (SGOT) 11/29/2022 23  15 - 37 U/L Final    Alk.  phosphatase 11/29/2022 103  45 - 117 U/L Final    Protein, total 11/29/2022 7.8  6.4 - 8.2 g/dL Final    Albumin 11/29/2022 4.2  3.5 - 5.0 g/dL Final    Globulin 11/29/2022 3.6  2.0 - 4.0 g/dL Final    A-G Ratio 11/29/2022 1.2  1.1 - 2.2   Final    Vitamin D 25-Hydroxy 11/29/2022 17.3 (A)  30 - 100 ng/mL Final    Comment: (NOTE)  Deficiency               <20 ng/mL  Insufficiency          20-30 ng/mL  Sufficient             ng/mL  Possible toxicity       >100 ng/mL    The Method used is Siemens Advia Centaur currently standardized to a   Center of Disease Control and Prevention (CDC) certified reference   [facility]. Samples containing fluorescein dye can produce falsely   elevated values when tested with the ADVIA Centaur Vitamin D Assay. It is recommended that results in the toxic range, >100 ng/mL, be   retested 72 hours post fluorescein exposure. SAMPLES BEING HELD 11/29/2022 1LAV   Final    COMMENT 11/29/2022 Add-on orders for these samples will be processed based on acceptable specimen integrity and analyte stability, which may vary by analyte. Final         Radiographic Studies:  XR Results (most recent):  Results from Hospital Encounter encounter on 03/23/17    XR CHEST PORT    Narrative  EXAM: Portable CXR.  2359 hours    INDICATION: Chest tightness, shortness of breath, dizziness, sweating and nausea  and feeling faint tonight. The lungs are clear. Heart is normal in size. There is no overt pulmonary edema. There is no evident pneumothorax, apparent adenopathy or sizable pleural  effusion. Impression  IMPRESSION: No Acute Disease. ZACHARY Results (most recent):  Results from Abstract encounter on 07/06/22    ZACHARY 3D SHAMEKA W MAMMO BI SCREENING INCL CAD     CT Results (most recent):  Results from Hospital Encounter encounter on 03/30/23    CT HEAD WO CONT    Narrative  EXAM: CT HEAD WO CONT    INDICATION: New daily persistent headache    COMPARISON: None. CONTRAST: None. TECHNIQUE: Unenhanced CT of the head was performed using 5 mm images. Brain and  bone windows were generated. Coronal and sagittal reformats.  CT dose reduction  was achieved through use of a standardized protocol tailored for this  examination and automatic exposure control for dose modulation. FINDINGS:  The ventricles and sulci are normal in size, shape and configuration. There is  no significant white matter disease. There is no intracranial hemorrhage,  extra-axial collection, or mass effect. The basilar cisterns are open. No CT  evidence of acute infarct. The bone windows demonstrate no abnormalities. The visualized portions of the  paranasal sinuses and mastoid air cells are clear. Impression  No acute intracranial abnormality. DEXA Results (most recent):  Results from Hospital Encounter encounter on 01/19/23    DEXA BONE DENSITY STUDY AXIAL    Narrative  Bone Mineral Density    Indication: Postmenopausal  Age: 78  Sex: Female. Menopause status: Postmenopausal.  Hormone replacement therapy: No    Number of falls in the past year: 1  Risk factors for osteoporosis: History of rheumatoid arthritis    Current medication for osteoporosis: None. Comparison: None. Technique: Imaging was performed on the [facility]. World Health Organization  meta-analysis fracture risk calculator (FRAX) analysis was performed for 10 year  fracture risk probability assessment    Excluded sites: L3 and L4 due to spondylosis    Findings:      Femoral Neck: Right  Bone mineral density (gm/cm2): 0.987  % of peak bone mass: 95  % for age matched controls: 114  T-score: -0.4  Z-score: 0.9    Total Hip: Right  Bone mineral density (gm/cm2): 1.032  % of peak bone mass: 102  % for age matched controls: 116  T-score: 0.2  Z-score: 1.1    Lumbar Spine: L1-L2  Bone mineral density (gm/cm2): 1.248  % of peak bone mass: 106  % for age matched controls: 120  T-score: 0.6  Z-score: 1.7    33% Radius:  Left  Bone mineral density (gm/cm2):  0.751  % of peak bone mass:  85  % for age matched controls:  99  T-score:  -1.5  Z-score:  -0.1    Impression  Impression:     This patient is osteopenic using the World Health Organization criteria  10 year probability of major osteoporotic fracture: 7.3%  10 year probability of hip fracture: 0.3%    Recommendations:  Therapy recommendations need to be tailored to each individual patient. Using  the [facility]) FRAX absolute fracture algorithm, the  [facility] recommends beginning pharmacological therapy in  postmenopausal women and men over the age of 48 with a 8 year probability of a  hip fracture of >3% OR with the 10 year probability of a major osteoporotic  fracture of >20%. Please reconsider testing based on risk factors. Currently, Medicare will only  reimburse for a central DXA examination every two years, unless the patient is  on chronic glucocorticoid therapy. Note: Please note that reliable, valid comparisons cannot be made between  studies which have been performed on machines from different manufacturers. If  clinically warranted, a follow up study performed at this site, on the same  unit, would allow the most sensitive assessment of change in bone mineral  density. MRI Results (most recent):  Results from [place] encounter on 08/10/22    MRI LUMB SPINE WO CONT    Narrative  INDICATION:  Low back pain    EXAMINATION:  MRI LUMBAR SPINE without CONTRAST    COMPARISON: None    TECHNIQUE: MR imaging of the lumbar spine was performed with sagittal T1, T2,  STIR;  axial T1, T2.  NO CONTRAST ADMINISTERED    FINDINGS:    Mild dextroscoliosis of the lumbar spine. No other significant malalignment. Moderate to severe degenerative disc disease at L3-4 and L4-5. No evidence for  acute fracture. No abnormality of the distal spinal cord. T12-L1: No central spinal canal or neural foraminal stenosis. Mild facet  arthropathy    L1/2: Small disc osteophyte complex without any central spinal canal or neural  foraminal stenosis. Mild facet arthropathy    L2/3: Disc osteophyte complex with minimal central stenosis.  Narrowing of the  left subarticular zone without any neural foraminal stenosis. Mild to moderate  facet arthropathy    L3/4: Disc osteophyte complex, facet arthropathy, and ligamentum flavum  hypertrophy causing mild central stenosis. Narrowing of the left subarticular  zone with mild left and no right neural foraminal stenosis    L4/5: Disc osteophyte complex, facet arthropathy, and ligamentum flavum  hypertrophy causing mild to moderate central stenosis. Right subarticular  stenosis with narrowing of the left subarticular zone and mild to moderate right  and mild left neural foraminal stenosis    L5/S1: Small disc bulge causing no central spinal canal or neural foraminal  stenosis. Mild to moderate facet arthropathy. Small T2 hyperintense liver lesion is probably a cyst.    Impression  1. Mild scoliosis of the lumbar spine with mild to moderate L4-5 central  stenosis. Moderate to severe L3-4 and L4-5 degenerative disc disease. 2. Multilevel neural foraminal narrowing as described above. Assessment/Plan:       ICD-10-CM ICD-9-CM    1. Preoperative clearance  Z01.818 V72.84 XR SPINE CERV FLEX/EXT MAX 3 V      2. Postnasal drip  R09.82 784.91       3. Acute non-recurrent frontal sinusitis  J01.10 461.1       4. Primary hypertension  I10 401.9       5. Dysuria  R30.0 788. 1 URINALYSIS W/ RFLX MICROSCOPIC      CULTURE, URINE      6. Hyponatremia  E87.1 276.1       7. Rheumatoid arthritis involving multiple sites, unspecified whether rheumatoid factor present ([facility]  M06.9 714.0 XR SPINE CERV FLEX/EXT MAX 3 V             Dysuria:  - Checking urinalysis; checking urine culture; note history of ongoing use of etanercept; low threshold for antibiotic therapy but holding off for now. Consider possibility of yeast infection given previous course of doxycycline. Acute sinusitis:  - Symptomatically improved following treatment with doxycycline. Her head CT was unremarkable.   Continue with current plan of care, patient is set to complete doxycycline today.    Rheumatoid arthritis:  - Follows with rheumatology for same, continues on etanercept as well as hydroxychloroquine. Currently asymptomatic, symptomatically quiescent; continuing with current care, deferring additional changes to subspecialty care. Hyponatremia:  - As observed on previous laboratory studies. She was previously hyponatremic to the 133-1 34 range. Rechecking labs; suspect that her previous euvolemic hyponatremia was likely secondary to nausea, mild dehydration. Pre-Operative Risk Assessment Using 2014 ACC/AHA Guidelines     Emergent procedure: No  Active Cardiac Condition including decompensated HF, Arrhythmia, MI <3 weeks, severe valve disease: No  Risk Level of Procedure: Intermediate Risk (intraperitoneal, intrathoracic, HENT, orthopedic, or carotid endarterectomy, etc.)  Revised Cardiac Risk Index Risk factors: None  Measurement of Exercise Tolerance before Surgery >4 METS (climbing > 1 flight of stairs without stopping, walking up hill > 1-2 blocks, scrubbing floors, moving furniture, golf, bowling, dancing or tennis, or running short distance): Yes    According to the 2014 ACC/AHA pre-operative risk assessment guidelines [address] is at low risk for major cardiac complications during a Intermediate Risk procedure, exercise tolerance is >4 METS  and procedure may proceed as planned. Specific medication recommendations are listed below. Request further recommendations from consultants: rheumatology    Medication Recommendations (taken with a sip of water even when NPO):  ACEI/ARB Continue the day of surgery  Calcium Channel Blocker Hold one dose prior to surgery     Rechecking sodium level prior to surgery today. If worsening, then probably would need additional evaluation prior to undergoing surgery. I expect this to have normalized given her interval clinical improvement over the past few days.   Cervical plain films are also ordered to evaluate atlantoaxial joint stability. Recommend holding etanercept prior to surgery; deferring to subspecialty care; patient reports having received preoperative clearance from rheumatology. I have reviewed the patient's medical history in detail and updated the computerized patient record. We had a prolonged discussion about these complex clinical issues and went over the various important aspects to consider. All questions were answered. Advised the patient to call back or return to office if symptoms do not improve, change in nature, or persist.     The patient was given an after visit summary or informed of [employer] which includes patient instructions, diagnoses, current medications, & vitals. she expressed understanding with the diagnosis and plan. Elizabeth Yuen MD    Please note that this dictation was completed with [employer], the computer voice recognition software. Quite often unanticipated grammatical, syntax, homophones, and other interpretive errors are inadvertently transcribed by the computer software. Please disregard these errors. Please excuse any errors that have escaped final proofreading.

## 2023-04-05 LAB
BACTERIA SPEC CULT: ABNORMAL
CC UR VC: ABNORMAL
SERVICE CMNT-IMP: ABNORMAL

## 2023-04-06 NOTE — PROGRESS NOTES
Urinalysis shows findings consistent with UTI. Urine culture shows pan sensitive E. coli. macrobid is ordered. Stable hyponatremia and renal function.

## 2023-04-16 ENCOUNTER — HOSPITAL ENCOUNTER (INPATIENT)
Age: 68
LOS: 2 days | Discharge: HOME | DRG: 391 | End: 2023-04-18
Attending: EMERGENCY MEDICINE | Admitting: INTERNAL MEDICINE
Payer: MEDICARE

## 2023-04-16 ENCOUNTER — APPOINTMENT (OUTPATIENT)
Dept: CT IMAGING | Age: 68
DRG: 391 | End: 2023-04-16
Attending: EMERGENCY MEDICINE
Payer: MEDICARE

## 2023-04-16 ENCOUNTER — APPOINTMENT (OUTPATIENT)
Dept: GENERAL RADIOLOGY | Age: 68
DRG: 391 | End: 2023-04-16
Attending: EMERGENCY MEDICINE
Payer: MEDICARE

## 2023-04-16 DIAGNOSIS — R11.2 NAUSEA VOMITING AND DIARRHEA: ICD-10-CM

## 2023-04-16 DIAGNOSIS — Z96.651 STATUS POST RIGHT KNEE REPLACEMENT: ICD-10-CM

## 2023-04-16 DIAGNOSIS — R19.7 NAUSEA VOMITING AND DIARRHEA: ICD-10-CM

## 2023-04-16 DIAGNOSIS — K52.9 COLITIS: Primary | ICD-10-CM

## 2023-04-16 PROBLEM — K29.70 GASTRITIS: Status: ACTIVE | Noted: 2023-04-16

## 2023-04-16 PROBLEM — A09 INFECTIOUS COLITIS: Status: ACTIVE | Noted: 2023-04-16

## 2023-04-16 PROBLEM — K92.2 ACUTE LOWER GI BLEEDING: Status: ACTIVE | Noted: 2023-04-16

## 2023-04-16 LAB
ALBUMIN SERPL-MCNC: 3.4 G/DL (ref 3.5–5)
ALBUMIN/GLOB SERPL: 0.9 (ref 1.1–2.2)
ALP SERPL-CCNC: 71 U/L (ref 45–117)
ALT SERPL-CCNC: 25 U/L (ref 12–78)
ANION GAP SERPL CALC-SCNC: 5 MMOL/L (ref 5–15)
APPEARANCE UR: CLEAR
AST SERPL-CCNC: 16 U/L (ref 15–37)
BACTERIA URNS QL MICRO: NEGATIVE /HPF
BASOPHILS # BLD: 0 K/UL (ref 0–0.1)
BASOPHILS NFR BLD: 0 % (ref 0–1)
BILIRUB DIRECT SERPL-MCNC: 0.2 MG/DL (ref 0–0.2)
BILIRUB SERPL-MCNC: 0.7 MG/DL (ref 0.2–1)
BILIRUB UR QL: NEGATIVE
BUN SERPL-MCNC: 11 MG/DL (ref 6–20)
BUN/CREAT SERPL: 24 (ref 12–20)
CALCIUM SERPL-MCNC: 9.6 MG/DL (ref 8.5–10.1)
CHLORIDE SERPL-SCNC: 98 MMOL/L (ref 97–108)
CO2 SERPL-SCNC: 25 MMOL/L (ref 21–32)
COLOR UR: ABNORMAL
COMMENT, HOLDF: NORMAL
CREAT SERPL-MCNC: 0.46 MG/DL (ref 0.55–1.02)
DIFFERENTIAL METHOD BLD: ABNORMAL
EOSINOPHIL # BLD: 0 K/UL (ref 0–0.4)
EOSINOPHIL NFR BLD: 0 % (ref 0–7)
EPITH CASTS URNS QL MICRO: ABNORMAL /LPF
ERYTHROCYTE [DISTWIDTH] IN BLOOD BY AUTOMATED COUNT: 12.4 % (ref 11.5–14.5)
FLUAV AG NPH QL IA: NEGATIVE
FLUBV AG NOSE QL IA: NEGATIVE
GLOBULIN SER CALC-MCNC: 3.9 G/DL (ref 2–4)
GLUCOSE SERPL-MCNC: 95 MG/DL (ref 65–100)
GLUCOSE UR STRIP.AUTO-MCNC: NEGATIVE MG/DL
HCT VFR BLD AUTO: 31.7 % (ref 35–47)
HGB BLD-MCNC: 10.5 G/DL (ref 11.5–16)
HGB UR QL STRIP: NEGATIVE
HYALINE CASTS URNS QL MICRO: ABNORMAL /LPF (ref 0–2)
IMM GRANULOCYTES # BLD AUTO: 0 K/UL (ref 0–0.04)
IMM GRANULOCYTES NFR BLD AUTO: 0 % (ref 0–0.5)
KETONES UR QL STRIP.AUTO: 40 MG/DL
LACTATE BLD-SCNC: 0.97 MMOL/L (ref 0.4–2)
LEUKOCYTE ESTERASE UR QL STRIP.AUTO: NEGATIVE
LIPASE SERPL-CCNC: 81 U/L (ref 73–393)
LYMPHOCYTES # BLD: 1.2 K/UL (ref 0.8–3.5)
LYMPHOCYTES NFR BLD: 10 % (ref 12–49)
MCH RBC QN AUTO: 30.3 PG (ref 26–34)
MCHC RBC AUTO-ENTMCNC: 33.1 G/DL (ref 30–36.5)
MCV RBC AUTO: 91.6 FL (ref 80–99)
MONOCYTES # BLD: 0.8 K/UL (ref 0–1)
MONOCYTES NFR BLD: 7 % (ref 5–13)
NEUTS SEG # BLD: 9.5 K/UL (ref 1.8–8)
NEUTS SEG NFR BLD: 83 % (ref 32–75)
NITRITE UR QL STRIP.AUTO: NEGATIVE
NRBC # BLD: 0 K/UL (ref 0–0.01)
NRBC BLD-RTO: 0 PER 100 WBC
PH UR STRIP: >8.5 [PH] (ref 5–8)
PLATELET # BLD AUTO: 309 K/UL (ref 150–400)
PMV BLD AUTO: 8.4 FL (ref 8.9–12.9)
POTASSIUM SERPL-SCNC: 4 MMOL/L (ref 3.5–5.1)
PROT SERPL-MCNC: 7.3 G/DL (ref 6.4–8.2)
PROT UR STRIP-MCNC: NEGATIVE MG/DL
RBC # BLD AUTO: 3.46 M/UL (ref 3.8–5.2)
RBC #/AREA URNS HPF: ABNORMAL /HPF (ref 0–5)
SAMPLES BEING HELD,HOLD: NORMAL
SARS-COV-2 RDRP RESP QL NAA+PROBE: NOT DETECTED
SODIUM SERPL-SCNC: 128 MMOL/L (ref 136–145)
SOURCE, COVRS: NORMAL
SP GR UR REFRACTOMETRY: 1.01
UA: UC IF INDICATED,UAUC: ABNORMAL
UROBILINOGEN UR QL STRIP.AUTO: 1 EU/DL (ref 0.2–1)
WBC # BLD AUTO: 11.5 K/UL (ref 3.6–11)
WBC URNS QL MICRO: ABNORMAL /HPF (ref 0–4)

## 2023-04-16 PROCEDURE — C9113 INJ PANTOPRAZOLE SODIUM, VIA: HCPCS | Performed by: INTERNAL MEDICINE

## 2023-04-16 PROCEDURE — 85025 COMPLETE CBC W/AUTO DIFF WBC: CPT

## 2023-04-16 PROCEDURE — 99285 EMERGENCY DEPT VISIT HI MDM: CPT

## 2023-04-16 PROCEDURE — 83605 ASSAY OF LACTIC ACID: CPT

## 2023-04-16 PROCEDURE — 74011000258 HC RX REV CODE- 258: Performed by: EMERGENCY MEDICINE

## 2023-04-16 PROCEDURE — 83690 ASSAY OF LIPASE: CPT

## 2023-04-16 PROCEDURE — 87804 INFLUENZA ASSAY W/OPTIC: CPT

## 2023-04-16 PROCEDURE — 74011000636 HC RX REV CODE- 636: Performed by: EMERGENCY MEDICINE

## 2023-04-16 PROCEDURE — 71045 X-RAY EXAM CHEST 1 VIEW: CPT

## 2023-04-16 PROCEDURE — 96375 TX/PRO/DX INJ NEW DRUG ADDON: CPT

## 2023-04-16 PROCEDURE — 96374 THER/PROPH/DIAG INJ IV PUSH: CPT

## 2023-04-16 PROCEDURE — 36415 COLL VENOUS BLD VENIPUNCTURE: CPT

## 2023-04-16 PROCEDURE — 74177 CT ABD & PELVIS W/CONTRAST: CPT

## 2023-04-16 PROCEDURE — 74011250636 HC RX REV CODE- 250/636: Performed by: EMERGENCY MEDICINE

## 2023-04-16 PROCEDURE — 74011250636 HC RX REV CODE- 250/636: Performed by: INTERNAL MEDICINE

## 2023-04-16 PROCEDURE — 74011250637 HC RX REV CODE- 250/637: Performed by: INTERNAL MEDICINE

## 2023-04-16 PROCEDURE — 87635 SARS-COV-2 COVID-19 AMP PRB: CPT

## 2023-04-16 PROCEDURE — 81001 URINALYSIS AUTO W/SCOPE: CPT

## 2023-04-16 PROCEDURE — 65270000015 HC RM PRIVATE ONCOLOGY

## 2023-04-16 PROCEDURE — 74011000250 HC RX REV CODE- 250: Performed by: INTERNAL MEDICINE

## 2023-04-16 PROCEDURE — 73560 X-RAY EXAM OF KNEE 1 OR 2: CPT

## 2023-04-16 PROCEDURE — 80076 HEPATIC FUNCTION PANEL: CPT

## 2023-04-16 PROCEDURE — 80048 BASIC METABOLIC PNL TOTAL CA: CPT

## 2023-04-16 RX ORDER — FAMOTIDINE 20 MG/1
20 TABLET, FILM COATED ORAL DAILY
Qty: 30 TABLET | Refills: 0 | Status: ON HOLD | COMMUNITY
Start: 2023-04-11 | End: 2023-04-18 | Stop reason: SDUPTHER

## 2023-04-16 RX ORDER — FLUTICASONE PROPIONATE 50 MCG
2 SPRAY, SUSPENSION (ML) NASAL DAILY
Status: DISCONTINUED | OUTPATIENT
Start: 2023-04-17 | End: 2023-04-18 | Stop reason: HOSPADM

## 2023-04-16 RX ORDER — OXYCODONE HYDROCHLORIDE 5 MG/1
5 TABLET ORAL
COMMUNITY
Start: 2023-04-11 | End: 2023-04-18

## 2023-04-16 RX ORDER — CELECOXIB 200 MG/1
200 CAPSULE ORAL 2 TIMES DAILY
COMMUNITY
Start: 2023-04-11

## 2023-04-16 RX ORDER — ACETAMINOPHEN 650 MG/1
650 SUPPOSITORY RECTAL
Status: DISCONTINUED | OUTPATIENT
Start: 2023-04-16 | End: 2023-04-18 | Stop reason: HOSPADM

## 2023-04-16 RX ORDER — KETOROLAC TROMETHAMINE 10 MG/1
10 TABLET, FILM COATED ORAL
COMMUNITY
Start: 2023-04-11 | End: 2023-04-18

## 2023-04-16 RX ORDER — TRAMADOL HYDROCHLORIDE 50 MG/1
50-100 TABLET ORAL
COMMUNITY
Start: 2023-04-12 | End: 2023-04-22

## 2023-04-16 RX ORDER — METRONIDAZOLE 500 MG/100ML
500 INJECTION, SOLUTION INTRAVENOUS
Status: COMPLETED | OUTPATIENT
Start: 2023-04-16 | End: 2023-04-16

## 2023-04-16 RX ORDER — CEFADROXIL 500 MG/1
CAPSULE ORAL
COMMUNITY
Start: 2023-04-11 | End: 2023-04-18

## 2023-04-16 RX ORDER — HYDROXYCHLOROQUINE SULFATE 200 MG/1
200 TABLET, FILM COATED ORAL 2 TIMES DAILY
Status: DISCONTINUED | OUTPATIENT
Start: 2023-04-16 | End: 2023-04-18 | Stop reason: HOSPADM

## 2023-04-16 RX ORDER — OXYCODONE HYDROCHLORIDE 5 MG/1
5 TABLET ORAL
Status: DISCONTINUED | OUTPATIENT
Start: 2023-04-16 | End: 2023-04-16

## 2023-04-16 RX ORDER — VALSARTAN 160 MG/1
320 TABLET ORAL DAILY
Status: DISCONTINUED | OUTPATIENT
Start: 2023-04-17 | End: 2023-04-16

## 2023-04-16 RX ORDER — VALSARTAN 160 MG/1
320 TABLET ORAL DAILY
Status: DISCONTINUED | OUTPATIENT
Start: 2023-04-16 | End: 2023-04-18 | Stop reason: HOSPADM

## 2023-04-16 RX ORDER — TRAMADOL HYDROCHLORIDE 50 MG/1
50 TABLET ORAL
Status: DISCONTINUED | OUTPATIENT
Start: 2023-04-16 | End: 2023-04-18 | Stop reason: HOSPADM

## 2023-04-16 RX ORDER — SULFASALAZINE 500 MG/1
500 TABLET ORAL 2 TIMES DAILY
Status: DISCONTINUED | OUTPATIENT
Start: 2023-04-16 | End: 2023-04-18 | Stop reason: HOSPADM

## 2023-04-16 RX ORDER — ACETAMINOPHEN 325 MG/1
650 TABLET ORAL
Status: DISCONTINUED | OUTPATIENT
Start: 2023-04-16 | End: 2023-04-18 | Stop reason: HOSPADM

## 2023-04-16 RX ORDER — ONDANSETRON 2 MG/ML
4 INJECTION INTRAMUSCULAR; INTRAVENOUS ONCE
Status: COMPLETED | OUTPATIENT
Start: 2023-04-16 | End: 2023-04-16

## 2023-04-16 RX ORDER — SODIUM CHLORIDE 9 MG/ML
100 INJECTION, SOLUTION INTRAVENOUS CONTINUOUS
Status: DISCONTINUED | OUTPATIENT
Start: 2023-04-16 | End: 2023-04-18 | Stop reason: HOSPADM

## 2023-04-16 RX ORDER — CEPHRADINE 500 MG
1000 CAPSULE ORAL DAILY
COMMUNITY

## 2023-04-16 RX ORDER — POLYETHYLENE GLYCOL 3350 17 G/17G
17 POWDER, FOR SOLUTION ORAL DAILY PRN
Status: DISCONTINUED | OUTPATIENT
Start: 2023-04-16 | End: 2023-04-18 | Stop reason: HOSPADM

## 2023-04-16 RX ORDER — METRONIDAZOLE 500 MG/100ML
500 INJECTION, SOLUTION INTRAVENOUS EVERY 8 HOURS
Status: DISCONTINUED | OUTPATIENT
Start: 2023-04-16 | End: 2023-04-17

## 2023-04-16 RX ORDER — AMLODIPINE BESYLATE 2.5 MG/1
2.5 TABLET ORAL DAILY
Status: DISCONTINUED | OUTPATIENT
Start: 2023-04-16 | End: 2023-04-18 | Stop reason: HOSPADM

## 2023-04-16 RX ORDER — NITROFURANTOIN 25; 75 MG/1; MG/1
CAPSULE ORAL
COMMUNITY
Start: 2023-04-12 | End: 2023-04-18

## 2023-04-16 RX ORDER — AMLODIPINE BESYLATE 2.5 MG/1
2.5 TABLET ORAL DAILY
Status: DISCONTINUED | OUTPATIENT
Start: 2023-04-17 | End: 2023-04-16

## 2023-04-16 RX ORDER — ONDANSETRON 4 MG/1
4 TABLET, ORALLY DISINTEGRATING ORAL
Status: DISCONTINUED | OUTPATIENT
Start: 2023-04-16 | End: 2023-04-18 | Stop reason: HOSPADM

## 2023-04-16 RX ORDER — HYDRALAZINE HYDROCHLORIDE 20 MG/ML
10 INJECTION INTRAMUSCULAR; INTRAVENOUS
Status: DISCONTINUED | OUTPATIENT
Start: 2023-04-16 | End: 2023-04-18 | Stop reason: HOSPADM

## 2023-04-16 RX ORDER — FENTANYL CITRATE 50 UG/ML
50 INJECTION, SOLUTION INTRAMUSCULAR; INTRAVENOUS ONCE
Status: COMPLETED | OUTPATIENT
Start: 2023-04-16 | End: 2023-04-16

## 2023-04-16 RX ORDER — SODIUM CHLORIDE 0.9 % (FLUSH) 0.9 %
5-40 SYRINGE (ML) INJECTION AS NEEDED
Status: DISCONTINUED | OUTPATIENT
Start: 2023-04-16 | End: 2023-04-18 | Stop reason: HOSPADM

## 2023-04-16 RX ORDER — ONDANSETRON 2 MG/ML
4 INJECTION INTRAMUSCULAR; INTRAVENOUS
Status: DISCONTINUED | OUTPATIENT
Start: 2023-04-16 | End: 2023-04-18 | Stop reason: HOSPADM

## 2023-04-16 RX ORDER — SODIUM CHLORIDE 0.9 % (FLUSH) 0.9 %
5-40 SYRINGE (ML) INJECTION EVERY 8 HOURS
Status: DISCONTINUED | OUTPATIENT
Start: 2023-04-16 | End: 2023-04-18 | Stop reason: HOSPADM

## 2023-04-16 RX ORDER — ASPIRIN 81 MG/1
81 TABLET ORAL 2 TIMES DAILY
Qty: 60 TABLET | Refills: 0 | COMMUNITY
Start: 2023-04-11 | End: 2023-05-11

## 2023-04-16 RX ADMIN — SODIUM CHLORIDE 75 ML/HR: 9 INJECTION, SOLUTION INTRAVENOUS at 16:33

## 2023-04-16 RX ADMIN — FENTANYL CITRATE 50 MCG: 50 INJECTION, SOLUTION INTRAMUSCULAR; INTRAVENOUS at 13:25

## 2023-04-16 RX ADMIN — SODIUM CHLORIDE, PRESERVATIVE FREE 10 ML: 5 INJECTION INTRAVENOUS at 21:33

## 2023-04-16 RX ADMIN — PANTOPRAZOLE SODIUM 40 MG: 40 INJECTION, POWDER, LYOPHILIZED, FOR SOLUTION INTRAVENOUS at 20:36

## 2023-04-16 RX ADMIN — ONDANSETRON 4 MG: 2 INJECTION INTRAMUSCULAR; INTRAVENOUS at 13:24

## 2023-04-16 RX ADMIN — CEFTRIAXONE SODIUM 2 G: 2 INJECTION, POWDER, FOR SOLUTION INTRAMUSCULAR; INTRAVENOUS at 15:47

## 2023-04-16 RX ADMIN — METRONIDAZOLE 500 MG: 500 INJECTION, SOLUTION INTRAVENOUS at 23:03

## 2023-04-16 RX ADMIN — ACETAMINOPHEN 650 MG: 325 TABLET ORAL at 23:03

## 2023-04-16 RX ADMIN — SODIUM CHLORIDE 1000 ML: 9 INJECTION, SOLUTION INTRAVENOUS at 13:29

## 2023-04-16 RX ADMIN — TRAMADOL HYDROCHLORIDE 50 MG: 50 TABLET ORAL at 20:34

## 2023-04-16 RX ADMIN — IOPAMIDOL 100 ML: 755 INJECTION, SOLUTION INTRAVENOUS at 13:11

## 2023-04-16 RX ADMIN — AMLODIPINE BESYLATE 2.5 MG: 2.5 TABLET ORAL at 20:35

## 2023-04-16 RX ADMIN — VALSARTAN 320 MG: 160 TABLET, FILM COATED ORAL at 20:35

## 2023-04-16 RX ADMIN — METRONIDAZOLE 500 MG: 500 INJECTION, SOLUTION INTRAVENOUS at 16:33

## 2023-04-17 ENCOUNTER — TELEPHONE (OUTPATIENT)
Dept: INTERNAL MEDICINE CLINIC | Age: 68
End: 2023-04-17

## 2023-04-17 LAB
ANION GAP SERPL CALC-SCNC: 3 MMOL/L (ref 5–15)
BUN SERPL-MCNC: 9 MG/DL (ref 6–20)
BUN/CREAT SERPL: 23 (ref 12–20)
CALCIUM SERPL-MCNC: 8.6 MG/DL (ref 8.5–10.1)
CHLORIDE SERPL-SCNC: 103 MMOL/L (ref 97–108)
CO2 SERPL-SCNC: 23 MMOL/L (ref 21–32)
CREAT SERPL-MCNC: 0.4 MG/DL (ref 0.55–1.02)
ERYTHROCYTE [DISTWIDTH] IN BLOOD BY AUTOMATED COUNT: 12.5 % (ref 11.5–14.5)
GLUCOSE SERPL-MCNC: 94 MG/DL (ref 65–100)
HCT VFR BLD AUTO: 28.1 % (ref 35–47)
HGB BLD-MCNC: 9.2 G/DL (ref 11.5–16)
MCH RBC QN AUTO: 30.5 PG (ref 26–34)
MCHC RBC AUTO-ENTMCNC: 32.7 G/DL (ref 30–36.5)
MCV RBC AUTO: 93 FL (ref 80–99)
NRBC # BLD: 0 K/UL (ref 0–0.01)
NRBC BLD-RTO: 0 PER 100 WBC
PLATELET # BLD AUTO: 270 K/UL (ref 150–400)
PMV BLD AUTO: 8.4 FL (ref 8.9–12.9)
POTASSIUM SERPL-SCNC: 3.9 MMOL/L (ref 3.5–5.1)
RBC # BLD AUTO: 3.02 M/UL (ref 3.8–5.2)
SODIUM SERPL-SCNC: 129 MMOL/L (ref 136–145)
WBC # BLD AUTO: 8.9 K/UL (ref 3.6–11)

## 2023-04-17 PROCEDURE — C9113 INJ PANTOPRAZOLE SODIUM, VIA: HCPCS | Performed by: INTERNAL MEDICINE

## 2023-04-17 PROCEDURE — 74011250637 HC RX REV CODE- 250/637: Performed by: INTERNAL MEDICINE

## 2023-04-17 PROCEDURE — 97535 SELF CARE MNGMENT TRAINING: CPT

## 2023-04-17 PROCEDURE — 65270000015 HC RM PRIVATE ONCOLOGY

## 2023-04-17 PROCEDURE — 85027 COMPLETE CBC AUTOMATED: CPT

## 2023-04-17 PROCEDURE — 36415 COLL VENOUS BLD VENIPUNCTURE: CPT

## 2023-04-17 PROCEDURE — 97165 OT EVAL LOW COMPLEX 30 MIN: CPT

## 2023-04-17 PROCEDURE — 80048 BASIC METABOLIC PNL TOTAL CA: CPT

## 2023-04-17 PROCEDURE — 74011250636 HC RX REV CODE- 250/636: Performed by: NURSE PRACTITIONER

## 2023-04-17 PROCEDURE — 74011000250 HC RX REV CODE- 250: Performed by: INTERNAL MEDICINE

## 2023-04-17 PROCEDURE — 74011250636 HC RX REV CODE- 250/636: Performed by: INTERNAL MEDICINE

## 2023-04-17 PROCEDURE — 74011250637 HC RX REV CODE- 250/637: Performed by: NURSE PRACTITIONER

## 2023-04-17 RX ORDER — ASPIRIN 81 MG/1
81 TABLET ORAL 2 TIMES DAILY
Status: DISCONTINUED | OUTPATIENT
Start: 2023-04-17 | End: 2023-04-18 | Stop reason: HOSPADM

## 2023-04-17 RX ORDER — CELECOXIB 100 MG/1
200 CAPSULE ORAL 2 TIMES DAILY
Status: DISCONTINUED | OUTPATIENT
Start: 2023-04-17 | End: 2023-04-18 | Stop reason: HOSPADM

## 2023-04-17 RX ORDER — AMLODIPINE BESYLATE 2.5 MG/1
TABLET ORAL
Qty: 90 TABLET | Refills: 0 | Status: SHIPPED | OUTPATIENT
Start: 2023-04-17

## 2023-04-17 RX ORDER — LANOLIN ALCOHOL/MO/W.PET/CERES
1.5 CREAM (GRAM) TOPICAL
Status: DISCONTINUED | OUTPATIENT
Start: 2023-04-17 | End: 2023-04-18 | Stop reason: HOSPADM

## 2023-04-17 RX ORDER — VANCOMYCIN HYDROCHLORIDE 50 MG/ML
125 KIT ORAL 4 TIMES DAILY
Status: DISCONTINUED | OUTPATIENT
Start: 2023-04-17 | End: 2023-04-18 | Stop reason: HOSPADM

## 2023-04-17 RX ORDER — VALSARTAN 320 MG/1
TABLET ORAL
Qty: 90 TABLET | Refills: 0 | Status: SHIPPED | OUTPATIENT
Start: 2023-04-17

## 2023-04-17 RX ADMIN — SULFASALAZINE 500 MG: 500 TABLET ORAL at 18:25

## 2023-04-17 RX ADMIN — ASPIRIN 81 MG: 81 TABLET, COATED ORAL at 14:45

## 2023-04-17 RX ADMIN — VANCOMYCIN HYDROCHLORIDE 125 MG: KIT at 21:57

## 2023-04-17 RX ADMIN — SODIUM CHLORIDE, PRESERVATIVE FREE 10 ML: 5 INJECTION INTRAVENOUS at 18:23

## 2023-04-17 RX ADMIN — ASPIRIN 81 MG: 81 TABLET, COATED ORAL at 18:25

## 2023-04-17 RX ADMIN — PANTOPRAZOLE SODIUM 40 MG: 40 INJECTION, POWDER, LYOPHILIZED, FOR SOLUTION INTRAVENOUS at 21:26

## 2023-04-17 RX ADMIN — METRONIDAZOLE 500 MG: 500 INJECTION, SOLUTION INTRAVENOUS at 09:26

## 2023-04-17 RX ADMIN — SODIUM CHLORIDE, PRESERVATIVE FREE 10 ML: 5 INJECTION INTRAVENOUS at 21:59

## 2023-04-17 RX ADMIN — SULFASALAZINE 500 MG: 500 TABLET ORAL at 09:26

## 2023-04-17 RX ADMIN — VANCOMYCIN HYDROCHLORIDE 125 MG: KIT at 18:23

## 2023-04-17 RX ADMIN — FLUTICASONE PROPIONATE 2 SPRAY: 50 SPRAY, METERED NASAL at 09:30

## 2023-04-17 RX ADMIN — HYDROXYCHLOROQUINE SULFATE 200 MG: 200 TABLET ORAL at 09:26

## 2023-04-17 RX ADMIN — AMLODIPINE BESYLATE 2.5 MG: 2.5 TABLET ORAL at 09:26

## 2023-04-17 RX ADMIN — SODIUM CHLORIDE, PRESERVATIVE FREE 10 ML: 5 INJECTION INTRAVENOUS at 06:03

## 2023-04-17 RX ADMIN — VALSARTAN 320 MG: 160 TABLET, FILM COATED ORAL at 09:26

## 2023-04-17 RX ADMIN — HYDROXYCHLOROQUINE SULFATE 200 MG: 200 TABLET ORAL at 18:25

## 2023-04-17 RX ADMIN — ACETAMINOPHEN 650 MG: 325 TABLET ORAL at 21:26

## 2023-04-17 RX ADMIN — CELECOXIB 200 MG: 100 CAPSULE ORAL at 18:27

## 2023-04-17 RX ADMIN — PANTOPRAZOLE SODIUM 40 MG: 40 INJECTION, POWDER, LYOPHILIZED, FOR SOLUTION INTRAVENOUS at 09:26

## 2023-04-17 RX ADMIN — ACETAMINOPHEN 650 MG: 325 TABLET ORAL at 07:45

## 2023-04-17 RX ADMIN — SODIUM CHLORIDE 100 ML/HR: 9 INJECTION, SOLUTION INTRAVENOUS at 22:20

## 2023-04-17 NOTE — PROGRESS NOTES
Hospitalist Progress Note    Subjective:   Daily Progress Note: 4/17/2023 10:12 AM    Hospital Course: Ms. Ulises Rubio is a 78year old female who presented to the emergency room with nausea, vomiting, abdominal cramps and diarrhea for the past couple of days accompanied by generalized weakness. Recent history of right total knee replacement approximately 5 days ago. Discharged on aspirin for DVT prophylaxis and celecoxib for pain management. History of E. Coli UTI, patient was treated with antibiotics outpatient. Patient was found to have extensive colitis on CT imaging along with gastritis with unremarkable blood work except mild leukocytosis at 11.5 with negative UA rapid flu and rapid COVID tests in the emergency room. Subjective: Patient observed resting in bed with eyes opened. Complains of weakness. Denies chest pain, shortness of breath, palpitations, lower extremity pain, dizziness or distress. No acute distress noted.     Current Facility-Administered Medications   Medication Dose Route Frequency    cefTRIAXone (ROCEPHIN) 1 g in 0.9% sodium chloride (MBP/ADV) 50 mL MBP  1 g IntraVENous Q24H    metroNIDAZOLE (FLAGYL) IVPB premix 500 mg  500 mg IntraVENous Q8H    pantoprazole (PROTONIX) 40 mg in 0.9% sodium chloride 10 mL injection  40 mg IntraVENous Q12H    fluticasone propionate (FLONASE) 50 mcg/actuation nasal spray 2 Spray  2 Spray Both Nostrils DAILY    hydrOXYchloroQUINE (PLAQUENIL) tablet 200 mg  200 mg Oral BID    sulfaSALAzine (AZULFIDINE) tablet 500 mg  500 mg Oral BID    0.9% sodium chloride infusion  100 mL/hr IntraVENous CONTINUOUS    sodium chloride (NS) flush 5-40 mL  5-40 mL IntraVENous Q8H    sodium chloride (NS) flush 5-40 mL  5-40 mL IntraVENous PRN    acetaminophen (TYLENOL) tablet 650 mg  650 mg Oral Q6H PRN    Or    acetaminophen (TYLENOL) suppository 650 mg  650 mg Rectal Q6H PRN    polyethylene glycol (MIRALAX) packet 17 g  17 g Oral DAILY PRN    ondansetron (ZOFRAN ODT) tablet 4 mg  4 mg Oral Q8H PRN    Or    ondansetron (ZOFRAN) injection 4 mg  4 mg IntraVENous Q6H PRN    traMADoL (ULTRAM) tablet 50 mg  50 mg Oral Q6H PRN    hydrALAZINE (APRESOLINE) 20 mg/mL injection 10 mg  10 mg IntraVENous Q6H PRN    amLODIPine (NORVASC) tablet 2.5 mg  2.5 mg Oral DAILY    valsartan (DIOVAN) tablet 320 mg  320 mg Oral DAILY        Review of Systems:    Review of Systems   Constitutional:  Positive for malaise/fatigue. HENT: Negative. Eyes: Negative. Respiratory: Negative. Cardiovascular: Negative. Gastrointestinal: Negative. Genitourinary: Negative. Musculoskeletal: Negative. Skin: Negative. Neurological: Negative. Endo/Heme/Allergies: Negative. Psychiatric/Behavioral: Negative. Objective:     Visit Vitals  BP (!) 158/66   Pulse 74   Temp 97.7 °F (36.5 °C)   Resp 17   Ht 5' 8.5\" (1.74 m)   Wt 79.4 kg (175 lb)   SpO2 99%   BMI 26.22 kg/m²      O2 Device: None (Room air)    Temp (24hrs), Av.2 °F (36.8 °C), Min:97.3 °F (36.3 °C), Max:99.3 °F (37.4 °C)      No intake/output data recorded. 04/15 1901 -  0700  In: 50 [I.V.:50]  Out: -     PHYSICAL EXAM:    Physical Exam  Constitutional:       Appearance: Normal appearance. HENT:      Head: Normocephalic and atraumatic. Right Ear: External ear normal.      Left Ear: External ear normal.      Nose: Nose normal.      Mouth/Throat:      Pharynx: Oropharynx is clear. Eyes:      Conjunctiva/sclera: Conjunctivae normal.   Cardiovascular:      Rate and Rhythm: Normal rate and regular rhythm. Pulses: Normal pulses. Heart sounds: Normal heart sounds. Pulmonary:      Effort: Pulmonary effort is normal.      Breath sounds: Normal breath sounds. Abdominal:      General: Bowel sounds are normal.   Musculoskeletal:      Cervical back: Normal range of motion. Comments: Limited ROM to right knee. No warmth or erythema noted. Skin:     General: Skin is warm and dry.       Capillary Refill: Capillary refill takes 2 to 3 seconds. Comments: Right knee without warmth, erythema. No bleeding, drainage or oozing noted to dressing. Neurological:      Mental Status: She is alert and oriented to person, place, and time. Psychiatric:         Mood and Affect: Mood normal.          Data Review    Recent Results (from the past 24 hour(s))   POC LACTIC ACID    Collection Time: 04/16/23 12:01 PM   Result Value Ref Range    Lactic Acid (POC) 0.97 0.40 - 2.00 mmol/L   CBC WITH AUTOMATED DIFF    Collection Time: 04/16/23 12:23 PM   Result Value Ref Range    WBC 11.5 (H) 3.6 - 11.0 K/uL    RBC 3.46 (L) 3.80 - 5.20 M/uL    HGB 10.5 (L) 11.5 - 16.0 g/dL    HCT 31.7 (L) 35.0 - 47.0 %    MCV 91.6 80.0 - 99.0 FL    MCH 30.3 26.0 - 34.0 PG    MCHC 33.1 30.0 - 36.5 g/dL    RDW 12.4 11.5 - 14.5 %    PLATELET 647 912 - 158 K/uL    MPV 8.4 (L) 8.9 - 12.9 FL    NRBC 0.0 0  WBC    ABSOLUTE NRBC 0.00 0.00 - 0.01 K/uL    NEUTROPHILS 83 (H) 32 - 75 %    LYMPHOCYTES 10 (L) 12 - 49 %    MONOCYTES 7 5 - 13 %    EOSINOPHILS 0 0 - 7 %    BASOPHILS 0 0 - 1 %    IMMATURE GRANULOCYTES 0 0.0 - 0.5 %    ABS. NEUTROPHILS 9.5 (H) 1.8 - 8.0 K/UL    ABS. LYMPHOCYTES 1.2 0.8 - 3.5 K/UL    ABS. MONOCYTES 0.8 0.0 - 1.0 K/UL    ABS. EOSINOPHILS 0.0 0.0 - 0.4 K/UL    ABS. BASOPHILS 0.0 0.0 - 0.1 K/UL    ABS. IMM.  GRANS. 0.0 0.00 - 0.04 K/UL    DF AUTOMATED     METABOLIC PANEL, BASIC    Collection Time: 04/16/23 12:23 PM   Result Value Ref Range    Sodium 128 (L) 136 - 145 mmol/L    Potassium 4.0 3.5 - 5.1 mmol/L    Chloride 98 97 - 108 mmol/L    CO2 25 21 - 32 mmol/L    Anion gap 5 5 - 15 mmol/L    Glucose 95 65 - 100 mg/dL    BUN 11 6 - 20 MG/DL    Creatinine 0.46 (L) 0.55 - 1.02 MG/DL    BUN/Creatinine ratio 24 (H) 12 - 20      eGFR >60 >60 ml/min/1.73m2    Calcium 9.6 8.5 - 10.1 MG/DL   SAMPLES BEING HELD    Collection Time: 04/16/23 12:23 PM   Result Value Ref Range    SAMPLES BEING HELD PST     COMMENT        Add-on orders for these samples will be processed based on acceptable specimen integrity and analyte stability, which may vary by analyte. HEPATIC FUNCTION PANEL    Collection Time: 04/16/23 12:23 PM   Result Value Ref Range    Protein, total 7.3 6.4 - 8.2 g/dL    Albumin 3.4 (L) 3.5 - 5.0 g/dL    Globulin 3.9 2.0 - 4.0 g/dL    A-G Ratio 0.9 (L) 1.1 - 2.2      Bilirubin, total 0.7 0.2 - 1.0 MG/DL    Bilirubin, direct 0.2 0.0 - 0.2 MG/DL    Alk.  phosphatase 71 45 - 117 U/L    AST (SGOT) 16 15 - 37 U/L    ALT (SGPT) 25 12 - 78 U/L   LIPASE    Collection Time: 04/16/23 12:23 PM   Result Value Ref Range    Lipase 81 73 - 393 U/L   URINALYSIS W/ REFLEX CULTURE    Collection Time: 04/16/23 12:49 PM    Specimen: Urine   Result Value Ref Range    Color YELLOW/STRAW      Appearance CLEAR CLEAR      Specific gravity 1.010      pH (UA) >8.5 (H) 5.0 - 8.0    Protein Negative NEG mg/dL    Glucose Negative NEG mg/dL    Ketone 40 (A) NEG mg/dL    Bilirubin Negative NEG      Blood Negative NEG      Urobilinogen 1.0 0.2 - 1.0 EU/dL    Nitrites Negative NEG      Leukocyte Esterase Negative NEG      UA:UC IF INDICATED CULTURE NOT INDICATED BY UA RESULT CNI      WBC 0-4 0 - 4 /hpf    RBC 0-5 0 - 5 /hpf    Epithelial cells MODERATE (A) FEW /lpf    Bacteria Negative NEG /hpf    Hyaline cast 0-2 0 - 2 /lpf   COVID-19 RAPID TEST    Collection Time: 04/16/23  1:21 PM   Result Value Ref Range    Specimen source Nasopharyngeal      COVID-19 rapid test Not detected NOTD     INFLUENZA A+B VIRAL AGS    Collection Time: 04/16/23  1:21 PM   Result Value Ref Range    Influenza A Antigen Negative NEG      Influenza B Antigen Negative NEG     METABOLIC PANEL, BASIC    Collection Time: 04/17/23  1:50 AM   Result Value Ref Range    Sodium 129 (L) 136 - 145 mmol/L    Potassium 3.9 3.5 - 5.1 mmol/L    Chloride 103 97 - 108 mmol/L    CO2 23 21 - 32 mmol/L    Anion gap 3 (L) 5 - 15 mmol/L    Glucose 94 65 - 100 mg/dL    BUN 9 6 - 20 MG/DL    Creatinine 0.40 (L) 0.55 - 1.02 MG/DL BUN/Creatinine ratio 23 (H) 12 - 20      eGFR >60 >60 ml/min/1.73m2    Calcium 8.6 8.5 - 10.1 MG/DL   CBC W/O DIFF    Collection Time: 04/17/23  1:50 AM   Result Value Ref Range    WBC 8.9 3.6 - 11.0 K/uL    RBC 3.02 (L) 3.80 - 5.20 M/uL    HGB 9.2 (L) 11.5 - 16.0 g/dL    HCT 28.1 (L) 35.0 - 47.0 %    MCV 93.0 80.0 - 99.0 FL    MCH 30.5 26.0 - 34.0 PG    MCHC 32.7 30.0 - 36.5 g/dL    RDW 12.5 11.5 - 14.5 %    PLATELET 066 408 - 212 K/uL    MPV 8.4 (L) 8.9 - 12.9 FL    NRBC 0.0 0  WBC    ABSOLUTE NRBC 0.00 0.00 - 0.01 K/uL       XR CHEST PORT   Final Result   No acute cardiopulmonary findings. CT ABD PELV W CONT   Final Result      Marked colitis   Gastritis   Left lower renal pole nonobstructing calculus      XR KNEE RT MAX 2 VWS   Final Result   Two views of the right knee demonstrate intra-articular gas and   trace soft tissue gas. This should be correlated with the timing of knee   replacement. No fracture or dislocation. Active Problems:    Infectious colitis (4/16/2023)      Gastritis (4/16/2023)      Acute lower GI bleeding (4/16/2023)        Assessment/Plan:   Acute colitis with gastritis - POA        Possible lower GIB - POA        Nausea and vomiting - improving  - etiology possibly infectious in nature  - patient reports blood with mucus after diarrhea  - CT of abdomen/pelvis positive for extensive colitis with gastritis, incidental non-obstructive kidney stones noted. - rapid flu and COVID test negative  - chest xray negative  - urinalysis negative for nitrites, leukocytes, and bacteria  - Hgb stable  - continue PPI  - will start oral vancomycin per GI recommendations for treatment of cdiff for 10 day course  - enteric precautions  - continue IVF  - enteric bacteria panel pending  - patient unable to produce stool consistent for adequate c-diff sample. - GI following    2.  Recent right total knee replacement by [facility]- 5 days ago  - patient may restart aspirin per GI for DVT prophylaxis  - may also restart celebrex  - PT/OT    3. Hyponatremia      Dehydration  - etiology secondary to nausea, vomiting, and diarrhea  - continue IVF  - will monitor lab work    4. HTN      History of RA      Prior history of smoking  - continue sulfasalazine, hydroxychloroquine  - avoid NSAIDs  - continue norvasc and diovan, prn hydralazine    Medical Decision Making:    Labs reviewed by myself   CBC and BMP    Diagnostic data reviewed by myself   CT abdomen pelvis and urinalysis    Toxic drug monitoring        Discussed case with   nursing    MDM Discussion   Ms. Kendal Fung is a 78year old female who presented to the emergency room with nausea, vomiting, abdominal cramps and diarrhea for the past couple of days accompanied by generalized weakness. Patient is being followed by GI for probable c-diff, acute colitis with gastritis, possible lower GIB. Anticipate discharge in 48-72 hours pending hospital course.       DVT Prophylaxis: aspirin and SCDs  Code Status:  Full Code  POA:  Wily Suarez, spouse [phone]    Care Plan discussed with: patient, nursing, GI, ,   _______________________________________________________________    Elmo Shukla NP

## 2023-04-17 NOTE — TELEPHONE ENCOUNTER
04/16/2023 09:57 AM    Received message from spouse's  stating that she was recovering from a total knee replacement performed on 04/12/2023. On 04/15/2023, she begain having having increased nausea, vomiting, diarrhea and weakness, and was unable to keep and food/fluids/meds down. They had called EMS to evaluate the patient and take to the ER for further treatment. This is just an [place].      Cassidy Fernandez, NP

## 2023-04-17 NOTE — CONSULTS
GI CONSULTATION NOTE  Bonita Villanueva NP  [phone] NP in-hospital cell phone M-F until 4:30  After 5pm or on weekends, please call  for physician on call    NAME: Brenda Duenas   :  1955   MRN:  [id number]   Attending:  Nael Arechiga NP  Primary GI:  Dr. Femi Oliva  Date/Time:  2023 11:40 AM  Assessment:   Diarrhea  Rectal bleeding  Recent antibiotics for UTI and sinusitis   No leukocytosis   Hgb 9.2  CT abd/pel with marked colitis   Stool studies pending   Last CLN in  with internal hemorrhoids and diverticulosis     Plan: Will start vancomycin to treat for C. Diff- vancomycin 125 mg PO QID for 10 days   Follow stool studies   Clear liquid diet for now   Continue PPI   Symptomatic care per primary team   Plan discussed with Dr. Nel Krishnan:     HISTORY OF PRESENT ILLNESS:     Brenda Duenas is an 78 y.o.  female who we are asked to see for complaint of diarrhea. Patient presented chief complaint of feeling generalized weakness after having nausea and vomiting with associated diarrhea for the past couple of days. History of recent right total knee replacement done-discharged home on aspirin twice daily for DVT prophylaxis along with celecoxib for pain  History of E. coli UTI and sinusitis for which she was treated with p.o. antibiotics as outpatient. Patient endorses crampy abdominal pain with associated decreased p.o. intake for the past 1 day. Patient continues to have some amount of pain in the right knee postop expected for recent surgery. Past Medical History:   Diagnosis Date    Former smoker, stopped smoking in distant past     History of abdominal pain     History of echocardiogram 2012    Left ventricle: Ejection fraction was estimated to be 65 %. Mitral valve: There was mild regurgitation. Aortic valve: There was no stenosis. Tricuspid valve: There was mild regurgitation    History of left heart catheterization 2012    1. Angiographically normal coronary arteries with normal left ventricular function. 2. Mildly elevated left ventricular filling pressures. No aortic stenosis. Systemic arterial pressures within normal limits. 3. Normal left ventricular function with an estimated ejection fraction of 60% and no significant mitral regurgitation. 4. Angiographically normal coronary arteries in a right dominant system    History of melanoma     Hypercholesterolemia     Hypertension     Long term current use of immunosuppressive drug     Lumbar degenerative disc disease     L3-L4    Rheumatoid arthritis (HCC)     RX = Etanercept, Sulfsalazine, Plaquenil    Transaminitis     Vitamin D deficiency       Past Surgical History:   Procedure Laterality Date    COLONOSCOPY N/A 10/1/2021    COLONOSCOPY performed by Balta Chan MD at [address]; HI RISK IND  10/1/2021         HX GYN       X2    HX MALIGNANT SKIN LESION EXCISION      melanoma x2    HX OTHER SURGICAL      lymph node removed from Right arm benign    HX OTHER SURGICAL  2021    Left upper chest subcutaneous nodule excision     Social History     Tobacco Use    Smoking status: Former     Packs/day: 1.00     Years: 1.00     Pack years: 1.00     Types: Cigarettes, Cigars    Smokeless tobacco: Former     Quit date:     Tobacco comments:     quit    Substance Use Topics    Alcohol use: No      Family History   Problem Relation Age of Onset    Breast Cancer Mother     Heart Disease Father     Dementia Father       Allergies   Allergen Reactions    Shellfish Derived Shortness of Breath    Levaquin [Levofloxacin] Rash    Codeine Rash    Pcn [Penicillins] Rash    Seafood [Shellfish Containing Products] Shortness of Breath      Prior to Admission medications    Medication Sig Start Date End Date Taking? Authorizing Provider   aspirin delayed-release 81 mg tablet Take 1 Tablet by mouth two (2) times a day.  23 Yes Provider, Historical   cefadroxil (DURICEF) 500 mg capsule TAKE 1 CAPSULE BY MOUTH TWICE DAILY FOR 7 DAYS 4/11/23  Yes Provider, Historical   celecoxib (CELEBREX) 200 mg capsule Take 1 Capsule by mouth two (2) times a day. 4/11/23  Yes Provider, Historical   cholecalciferol, vitamin d3, 250 mcg (10,000 unit) cap Take 1,000 Units by mouth daily. Yes Provider, Historical   famotidine (PEPCID) 20 mg tablet Take 1 Tablet by mouth daily. 4/11/23 5/11/23 Yes Provider, Historical   traMADoL (ULTRAM) 50 mg tablet Take 1-2 Tablets by mouth every eight (8) hours as needed. 4/12/23 4/22/23 Yes Provider, Historical   sod bicarb-sod chlor-neti pot ([place] Saline Nasal Neti Rinse) pkdv 1 Each by sinus irrigation route three (3) times daily. 3/30/23  Yes Michael Rodriguez MD   fluticasone propionate (FLONASE) 50 mcg/actuation nasal spray 2 Sprays by Both Nostrils route daily. Indications: chronic stuffy and runny nose not caused by allergies 3/30/23  Yes Michael Rodriguez MD   etanercept (ENBREL) 50 mg/mL (0.98 mL) injection 0.98 mL by SubCUTAneous route every seven (7) days. Indications: rheumatoid arthritis   Yes Madalyn, MD Siva   sulfaSALAzine (AZULFIDINE) 500 mg tablet Take 1 Tablet by mouth two (2) times a day. Take two tablets twice daily   Indications: rheumatoid arthritis   Yes Madalyn, MD Siva   hydroxychloroquine (PLAQUINIL) 200 mg tablet Take 1 Tablet by mouth two (2) times a day. Take one tablet twice daily   Yes Madalyn, MD Siva   acetaminophen (TYLENOL) 500 mg tablet Take 2 Tablets by mouth every six (6) hours as needed. Yes Madalyn, MD Siva   valsartan (DIOVAN) 320 mg tablet TAKE ONE TABLET BY MOUTH DAILY 4/17/23   Michael Rodriguez MD   amLODIPine (NORVASC) 2.5 mg tablet TAKE ONE TABLET BY MOUTH DAILY 4/17/23   Michael Rodriguez MD   ketorolac (TORADOL) 10 mg tablet Take 1 Tablet by mouth every six (6) hours as needed.   Patient not taking: Reported on 4/16/2023 4/11/23 4/16/23  Provider, Historical   nitrofurantoin, macrocrystal-monohydrate, (MACROBID) 100 mg capsule  4/12/23   Provider, Historical   oxyCODONE IR (ROXICODONE) 5 mg immediate release tablet Take 1 Tablet by mouth every four (4) hours as needed. Max Daily Amount: 30 mg. Patient not taking: Reported on 4/16/2023 4/11/23   Provider, Historical       Patient Active Problem List   Diagnosis Code    Rheumatoid arthritis ([facility] [address] M06.9    Abdominal pain R10.9    Elevated liver function tests R79.89    Sepsis, unspecified A41.9    History of melanoma Z85.820    HTN (hypertension) I10    Adverse drug reaction T50.905A    Long term current use of immunosuppressive drug Z79.899    Transaminitis R74.01    Hypertension I10    History of abdominal pain Z87.898    Former smoker, stopped smoking in distant past Z87.891    Melanoma of thoracic region [facility]) C43.59    Vitamin D deficiency E55.9    Infectious colitis A09    Gastritis K29.70    Acute lower GI bleeding K92.2       REVIEW OF SYSTEMS:    Constitutional: negative fever, negative chills, negative weight loss  Eyes:   negative visual changes  ENT:   negative sore throat, tongue or lip swelling   Respiratory:  negative cough, negative dyspnea  Cards:  negative for chest pain, palpitations, lower extremity edema  GI:   See HPI  :  negative for frequency, dysuria  Integument:  negative for rash and pruritus  Heme:  negative for easy bruising and gum/nose bleeding  Musculoskel: negative for myalgias,  back pain and muscle weakness  Neuro: negative for headaches, dizziness, vertigo  Psych: negative for feelings of anxiety, depression     Objective:   VITALS:    Visit Vitals  BP (!) 158/66   Pulse 74   Temp 97.7 °F (36.5 °C)   Resp 17   Ht 5' 8.5\" (1.74 m)   Wt 79.4 kg (175 lb)   SpO2 99%   BMI 26.22 kg/m²       PHYSICAL EXAM:   General:          Pleasant  female. NAD   Head:               Normocephalic, without obvious abnormality, atraumatic. Eyes:               Conjunctivae clear and pale, anicteric sclerae.   Pupils are equal  Nose: Nares normal. No drainage or sinus tenderness. Throat:             Lips, mucosa, and tongue normal.  No Thrush  Neck:               Supple, symmetrical,  no adenopathy, thyroid: non tender  Back:               Symmetric,  No CVA tenderness. Lungs:             CTA bilaterally. No wheezing/rhonchi/rales. Chest wall:      No tenderness or deformity. No Accessory muscle use. Heart:              Regular rate and rhythm,  no murmur, rub or gallop. Abdomen:        Soft, non-tender. Not distended. Bowel sounds normal. No masses  Extremities:     Atraumatic, No cyanosis. S/p knee replacement   Skin:                Texture, turgor normal. No rashes/lesions/jaundice  Psych:             Good insight. Not depressed. Not anxious or agitated. Neurologic:      EOMs intact. No facial asymmetry. No aphasia or slurred speech. A/O X 3. LAB DATA REVIEWED:    Recent Results (from the past 24 hour(s))   POC LACTIC ACID    Collection Time: 04/16/23 12:01 PM   Result Value Ref Range    Lactic Acid (POC) 0.97 0.40 - 2.00 mmol/L   CBC WITH AUTOMATED DIFF    Collection Time: 04/16/23 12:23 PM   Result Value Ref Range    WBC 11.5 (H) 3.6 - 11.0 K/uL    RBC 3.46 (L) 3.80 - 5.20 M/uL    HGB 10.5 (L) 11.5 - 16.0 g/dL    HCT 31.7 (L) 35.0 - 47.0 %    MCV 91.6 80.0 - 99.0 FL    MCH 30.3 26.0 - 34.0 PG    MCHC 33.1 30.0 - 36.5 g/dL    RDW 12.4 11.5 - 14.5 %    PLATELET 771 320 - 882 K/uL    MPV 8.4 (L) 8.9 - 12.9 FL    NRBC 0.0 0  WBC    ABSOLUTE NRBC 0.00 0.00 - 0.01 K/uL    NEUTROPHILS 83 (H) 32 - 75 %    LYMPHOCYTES 10 (L) 12 - 49 %    MONOCYTES 7 5 - 13 %    EOSINOPHILS 0 0 - 7 %    BASOPHILS 0 0 - 1 %    IMMATURE GRANULOCYTES 0 0.0 - 0.5 %    ABS. NEUTROPHILS 9.5 (H) 1.8 - 8.0 K/UL    ABS. LYMPHOCYTES 1.2 0.8 - 3.5 K/UL    ABS. MONOCYTES 0.8 0.0 - 1.0 K/UL    ABS. EOSINOPHILS 0.0 0.0 - 0.4 K/UL    ABS. BASOPHILS 0.0 0.0 - 0.1 K/UL    ABS. IMM.  GRANS. 0.0 0.00 - 0.04 K/UL    DF AUTOMATED     METABOLIC PANEL, BASIC Collection Time: 04/16/23 12:23 PM   Result Value Ref Range    Sodium 128 (L) 136 - 145 mmol/L    Potassium 4.0 3.5 - 5.1 mmol/L    Chloride 98 97 - 108 mmol/L    CO2 25 21 - 32 mmol/L    Anion gap 5 5 - 15 mmol/L    Glucose 95 65 - 100 mg/dL    BUN 11 6 - 20 MG/DL    Creatinine 0.46 (L) 0.55 - 1.02 MG/DL    BUN/Creatinine ratio 24 (H) 12 - 20      eGFR >60 >60 ml/min/1.73m2    Calcium 9.6 8.5 - 10.1 MG/DL   SAMPLES BEING HELD    Collection Time: 04/16/23 12:23 PM   Result Value Ref Range    SAMPLES BEING HELD PST     COMMENT        Add-on orders for these samples will be processed based on acceptable specimen integrity and analyte stability, which may vary by analyte. HEPATIC FUNCTION PANEL    Collection Time: 04/16/23 12:23 PM   Result Value Ref Range    Protein, total 7.3 6.4 - 8.2 g/dL    Albumin 3.4 (L) 3.5 - 5.0 g/dL    Globulin 3.9 2.0 - 4.0 g/dL    A-G Ratio 0.9 (L) 1.1 - 2.2      Bilirubin, total 0.7 0.2 - 1.0 MG/DL    Bilirubin, direct 0.2 0.0 - 0.2 MG/DL    Alk.  phosphatase 71 45 - 117 U/L    AST (SGOT) 16 15 - 37 U/L    ALT (SGPT) 25 12 - 78 U/L   LIPASE    Collection Time: 04/16/23 12:23 PM   Result Value Ref Range    Lipase 81 73 - 393 U/L   URINALYSIS W/ REFLEX CULTURE    Collection Time: 04/16/23 12:49 PM    Specimen: Urine   Result Value Ref Range    Color YELLOW/STRAW      Appearance CLEAR CLEAR      Specific gravity 1.010      pH (UA) >8.5 (H) 5.0 - 8.0    Protein Negative NEG mg/dL    Glucose Negative NEG mg/dL    Ketone 40 (A) NEG mg/dL    Bilirubin Negative NEG      Blood Negative NEG      Urobilinogen 1.0 0.2 - 1.0 EU/dL    Nitrites Negative NEG      Leukocyte Esterase Negative NEG      UA:UC IF INDICATED CULTURE NOT INDICATED BY UA RESULT CNI      WBC 0-4 0 - 4 /hpf    RBC 0-5 0 - 5 /hpf    Epithelial cells MODERATE (A) FEW /lpf    Bacteria Negative NEG /hpf    Hyaline cast 0-2 0 - 2 /lpf   COVID-19 RAPID TEST    Collection Time: 04/16/23  1:21 PM   Result Value Ref Range    Specimen source Nasopharyngeal      COVID-19 rapid test Not detected NOTD     INFLUENZA A+B VIRAL AGS    Collection Time: 04/16/23  1:21 PM   Result Value Ref Range    Influenza A Antigen Negative NEG      Influenza B Antigen Negative NEG     METABOLIC PANEL, BASIC    Collection Time: 04/17/23  1:50 AM   Result Value Ref Range    Sodium 129 (L) 136 - 145 mmol/L    Potassium 3.9 3.5 - 5.1 mmol/L    Chloride 103 97 - 108 mmol/L    CO2 23 21 - 32 mmol/L    Anion gap 3 (L) 5 - 15 mmol/L    Glucose 94 65 - 100 mg/dL    BUN 9 6 - 20 MG/DL    Creatinine 0.40 (L) 0.55 - 1.02 MG/DL    BUN/Creatinine ratio 23 (H) 12 - 20      eGFR >60 >60 ml/min/1.73m2    Calcium 8.6 8.5 - 10.1 MG/DL   CBC W/O DIFF    Collection Time: 04/17/23  1:50 AM   Result Value Ref Range    WBC 8.9 3.6 - 11.0 K/uL    RBC 3.02 (L) 3.80 - 5.20 M/uL    HGB 9.2 (L) 11.5 - 16.0 g/dL    HCT 28.1 (L) 35.0 - 47.0 %    MCV 93.0 80.0 - 99.0 FL    MCH 30.5 26.0 - 34.0 PG    MCHC 32.7 30.0 - 36.5 g/dL    RDW 12.5 11.5 - 14.5 %    PLATELET 411 974 - 114 K/uL    MPV 8.4 (L) 8.9 - 12.9 FL    NRBC 0.0 0  WBC    ABSOLUTE NRBC 0.00 0.00 - 0.01 K/uL       IMAGING RESULTS:  I have personally reviewed the imaging reports      Total time spent with patient: 25 minutes ________________________________________________________________________  Care Plan discussed with:  Patient x   Family     RN x              Consultant:  Hospitalist      CT  4/17/2023:  ________________________________________________________________________    ___________________________________________________  Consulting Provider: Burt Alpers, NP      4/17/2023  11:40 AM

## 2023-04-17 NOTE — PROGRESS NOTES
Problem: Pressure Injury - Risk of  Goal: *Prevention of pressure injury  Description: Document Martinez Scale and appropriate interventions in the flowsheet. Outcome: Progressing Towards Goal  Note: Pressure Injury Interventions:  Sensory Interventions: Monitor skin under medical devices         Activity Interventions: PT/OT evaluation, Increase time out of bed    Mobility Interventions: Pressure redistribution bed/mattress (bed type), PT/OT evaluation    Nutrition Interventions: Document food/fluid/supplement intake, Offer support with meals,snacks and hydration                     Problem: Risk for Spread of Infection  Goal: Prevent transmission of infectious organism to others  Description: Prevent the transmission of infectious organisms to other patients, staff members, and visitors.   Outcome: Progressing Towards Goal

## 2023-04-17 NOTE — PROGRESS NOTES
End of Shift Note    Bedside shift change report given to ELAN Evans RN (oncoming nurse) by Atif Lambert LPN (offgoing nurse). Report included the following information SBAR, Kardex, and MAR    Shift worked: 7p-7:30a      Shift summary and any significant changes:     Pt tolerated care fairly well. All pt meds administered per mar. Pt complained of knee pain. Pain treated with PRN tramadol and PRN tylenol administered. Pt uses bedside commode with x1 assist. Pt stool sample order unfulfilled pt. Did not have bm during shift.      Routine rounding completed, pt turns self, ice packs provided for pt R knee(knee 4/12)    Am labs drawn   Concerns for physician to address:       Zone phone for oncoming shift:              Atif Lambert LPN

## 2023-04-17 NOTE — PROGRESS NOTES
Occupational Therapy EVALUATION/discharge with a functional measure  Patient: Rachel Pollard (78 y.o. female)  Date: 4/17/2023  Primary Diagnosis: Infectious colitis [A09]  Gastritis [K29.70]  Acute lower GI bleeding [K92.2]       Precautions:   Contact (WBAT R LE, POD 5 TKA)    ASSESSMENT AND RECOMMENDATIONS:  Based on the objective data described below, the patient presents with POD 5 TKA, new onset of diarrhea after abx for sinus infection and UTI. She is currently recovering from TKA and is currently mod I for simple ADl tasks in room. She was ambulated from chair to toilet and back/forth from chair to door to chair x 3 trips with RW with mod I and only for iv pole management. She was educated to NOT overcompensate for R knee (see below) and verbalized understanding. She was provided with pillow case for under R foot to do heel slides in chair, heel amish removed from bed as this is contraindicated with TKA (placed in closet). SHe does NOT need any additional OT after treatment for recs/strategies provided as she is mod I and verbalized understanding. Nurse notified. .  Skilled occupational therapy is not indicated at this time. Discharge Recommendations: None  Further Equipment Recommendations for Discharge: none      SUBJECTIVE:   Patient stated I need to walk.     OBJECTIVE DATA SUMMARY:     Past Medical History:   Diagnosis Date    Former smoker, stopped smoking in distant past     History of abdominal pain     History of echocardiogram 06/2012    Left ventricle: Ejection fraction was estimated to be 65 %. Mitral valve: There was mild regurgitation. Aortic valve: There was no stenosis. Tricuspid valve: There was mild regurgitation    History of left heart catheterization 06/2012    1. Angiographically normal coronary arteries with normal left ventricular function. 2. Mildly elevated left ventricular filling pressures. No aortic stenosis. Systemic arterial pressures within normal limits.  3. Normal left ventricular function with an estimated ejection fraction of 60% and no significant mitral regurgitation. 4. Angiographically normal coronary arteries in a right dominant system    History of melanoma     Hypercholesterolemia     Hypertension     Long term current use of immunosuppressive drug     Lumbar degenerative disc disease     L3-L4    Rheumatoid arthritis (HCC)     RX = Etanercept, Sulfsalazine, Plaquenil    Transaminitis     Vitamin D deficiency      Past Surgical History:   Procedure Laterality Date    COLONOSCOPY N/A 10/1/2021    COLONOSCOPY performed by Alexia Maldonado., MD at [address]; HI RISK IND  10/1/2021         HX GYN       X2    HX MALIGNANT SKIN LESION EXCISION      melanoma x2    HX OTHER SURGICAL      lymph node removed from Right arm benign    HX OTHER SURGICAL  2021    Left upper chest subcutaneous nodule excision     Prior Level of Function/Home Situation: lives with  in trip level home with 3STE without rail, 10 steps up/4 down with B rails inside, mod I since TKA, I PTA, usually I for all ADL and I-ADL tasks. Home Situation  Home Environment: Private residence  # Steps to Enter: 3  Rails to Enter: No  One/Two Story Residence: Other (Comment) (tri-level)  # of Interior Steps: 10 (10 down and 4 up)  [employer]: Both  Living Alone: No  Support Systems: Spouse/Significant Other  Patient Expects to be Discharged to[de-identified] Home  Current DME Used/Available at Home: Izabella Bridges, rolling  [x]     Right hand dominant   []     Left hand dominant  Cognitive/Behavioral Status:                   Vision/Perceptual:                                   Balance:  Sitting: Intact  Standing: Intact; With support  Range of Motion:  BUE intact, impaired but functional R knee as expected  AROM: Within functional limits  PROM: Within functional limits                    Strength:  No c/o  Strength:  Within functional limits              Coordination:  Coordination: Within functional limits  Fine Motor Skills-Upper: Left Intact; Right Intact    Gross Motor Skills-Upper: Left Intact; Right Intact  BUE  Tone: Normal  Sensation: Intact                      Functional Mobility and Transfers for ADLs:  Bed Mobility:              Transfers:  Sit to Stand: Modified independent                Toilet Transfer : Modified independent           Bathroom Mobility: Modified independent  ADL Assessment:  Feeding: Independent    Oral Facial Hygiene/Grooming: Independent    Bathing: Modified independent    Upper Body Dressing: Modified independent    Lower Body Dressing: Modified independent    Toileting: Modified independent              ADL Intervention:       Grooming  Position Performed: Standing (cues to symmetricall WB thru B LE and not place R LE ant to ABELINO to incr ext R hip/knee)  Washing Face: Modified independent  Washing Hands: Modified independent  Brushing Teeth: Modified independent  Brushing/Combing Hair: Modified independent  Cues: Verbal cues provided                        Toileting  Toileting Assistance: Modified independent  Bladder Hygiene: Independent  Clothing Management: Modified independent  Cues: Verbal cues provided (to not extend R knee with sit to stand to sit to toilet unless necessarily)         Therapeutic Exercise:      Functional Measure:    Barthel Index:  Bathin  Bladder: 10  Bowels: 10  Groomin  Dressing: 10  Feeding: 10  Mobility: 15  Stairs: 10  Toilet Use: 10  Transfer (Bed to Chair and Back): 15  Total: 100/100      The Barthel ADL Index: Guidelines  1. The index should be used as a record of what a patient does, not as a record of what a patient could do. 2. The main aim is to establish degree of independence from any help, physical or verbal, however minor and for whatever reason. 3. The need for supervision renders the patient not independent. 4. A patient's performance should be established using the best available evidence.  Asking the patient, friends/relatives and nurses are the usual sources, but direct observation and common sense are also important. However direct testing is not needed. 5. Usually the patient's performance over the preceding 24-48 hours is important, but occasionally longer periods will be relevant. 6. Middle categories imply that the patient supplies over 50 per cent of the effort. 7. Use of aids to be independent is allowed. Score Interpretation (from [address] [facility])    Independent   60-79 Minimally independent   40-59 Partially dependent   20-39 Very dependent   <20 Totally dependent     -Iman Kendrick., Barthel, D.W. (1965). Functional evaluation: the Barthel Index. [address] ([address] (1997). The Barthel activities of daily living index: self-reporting versus actual performance in the old (> or = 75 years). Journal of [address] [place] [address] 45(7), [facility], ROCÍO, Tomas Haq., Karan Jones. (1999). Measuring the change in disability after inpatient rehabilitation; comparison of the responsiveness of the Barthel Index and Functional Barling Measure. Journal of Neurology, Neurosurgery, and Psychiatry, 66(4), 782-418. Whitney Garza, N.J.A, SUSY Jimenez, & Mleanie Kessler, M.A. (2004) Assessment of post-stroke quality of life in cost-effectiveness studies: The usefulness of the Barthel Index and the EuroQoL-5D. Quality of Life Research, 13, 427-43          Pain:  Pain Scale 1: Numeric (0 - 10)  Pain Intensity 1: 3              Activity Tolerance:   good  Please refer to the flowsheet for vital signs taken during this treatment.   After treatment:   [x]  Patient left in no apparent distress sitting up in chair  []  Patient left in no apparent distress in bed  [x]  Call bell left within reach  [x]  Nursing notified  []  Caregiver present  []  Bed alarm activated    COMMUNICATION/EDUCATION:   Communication/Collaboration:  [x]      Home safety education was provided and the patient/caregiver indicated understanding. [x]      Patient/family have participated as able and agree with findings and recommendations. []      Patient is unable to participate in plan of care at this time.   Findings and recommendations were discussed with: Registered Nurse    [employer], OTR/L  Time Calculation: 31 mins

## 2023-04-18 VITALS
HEART RATE: 76 BPM | OXYGEN SATURATION: 100 % | HEIGHT: 69 IN | RESPIRATION RATE: 18 BRPM | BODY MASS INDEX: 25.92 KG/M2 | DIASTOLIC BLOOD PRESSURE: 71 MMHG | WEIGHT: 175 LBS | TEMPERATURE: 98.2 F | SYSTOLIC BLOOD PRESSURE: 142 MMHG

## 2023-04-18 LAB
ANION GAP SERPL CALC-SCNC: 5 MMOL/L (ref 5–15)
BASOPHILS # BLD: 0 K/UL (ref 0–0.1)
BASOPHILS NFR BLD: 1 % (ref 0–1)
BUN SERPL-MCNC: 6 MG/DL (ref 6–20)
BUN/CREAT SERPL: 14 (ref 12–20)
C COLI+JEJUNI TUF STL QL NAA+PROBE: NEGATIVE
C DIFF GDH STL QL: NEGATIVE
C DIFF TOX A+B STL QL IA: NEGATIVE
CALCIUM SERPL-MCNC: 9.4 MG/DL (ref 8.5–10.1)
CHLORIDE SERPL-SCNC: 103 MMOL/L (ref 97–108)
CO2 SERPL-SCNC: 25 MMOL/L (ref 21–32)
CREAT SERPL-MCNC: 0.44 MG/DL (ref 0.55–1.02)
DIFFERENTIAL METHOD BLD: ABNORMAL
EC STX1+STX2 GENES STL QL NAA+PROBE: NEGATIVE
EOSINOPHIL # BLD: 0.3 K/UL (ref 0–0.4)
EOSINOPHIL NFR BLD: 5 % (ref 0–7)
ERYTHROCYTE [DISTWIDTH] IN BLOOD BY AUTOMATED COUNT: 12.3 % (ref 11.5–14.5)
ETEC ELTA+ESTB GENES STL QL NAA+PROBE: NEGATIVE
GLUCOSE SERPL-MCNC: 89 MG/DL (ref 65–100)
HCT VFR BLD AUTO: 31.9 % (ref 35–47)
HGB BLD-MCNC: 10 G/DL (ref 11.5–16)
IMM GRANULOCYTES # BLD AUTO: 0 K/UL (ref 0–0.04)
IMM GRANULOCYTES NFR BLD AUTO: 1 % (ref 0–0.5)
INTERPRETATION: NORMAL
LYMPHOCYTES # BLD: 1 K/UL (ref 0.8–3.5)
LYMPHOCYTES NFR BLD: 17 % (ref 12–49)
MAGNESIUM SERPL-MCNC: 2.1 MG/DL (ref 1.6–2.4)
MCH RBC QN AUTO: 29.5 PG (ref 26–34)
MCHC RBC AUTO-ENTMCNC: 31.3 G/DL (ref 30–36.5)
MCV RBC AUTO: 94.1 FL (ref 80–99)
MONOCYTES # BLD: 0.7 K/UL (ref 0–1)
MONOCYTES NFR BLD: 13 % (ref 5–13)
NEUTS SEG # BLD: 3.8 K/UL (ref 1.8–8)
NEUTS SEG NFR BLD: 63 % (ref 32–75)
NRBC # BLD: 0 K/UL (ref 0–0.01)
NRBC BLD-RTO: 0 PER 100 WBC
P SHIGELLOIDES DNA STL QL NAA+PROBE: NEGATIVE
PHOSPHATE SERPL-MCNC: 2.1 MG/DL (ref 2.6–4.7)
PLATELET # BLD AUTO: 278 K/UL (ref 150–400)
PMV BLD AUTO: 8 FL (ref 8.9–12.9)
POTASSIUM SERPL-SCNC: 3.6 MMOL/L (ref 3.5–5.1)
RBC # BLD AUTO: 3.39 M/UL (ref 3.8–5.2)
SALMONELLA SP SPAO STL QL NAA+PROBE: NEGATIVE
SHIGELLA SP+EIEC IPAH STL QL NAA+PROBE: NEGATIVE
SODIUM SERPL-SCNC: 133 MMOL/L (ref 136–145)
V CHOL+PARA+VUL DNA STL QL NAA+NON-PROBE: NEGATIVE
WBC # BLD AUTO: 5.9 K/UL (ref 3.6–11)
Y ENTEROCOL DNA STL QL NAA+NON-PROBE: NEGATIVE

## 2023-04-18 PROCEDURE — 85025 COMPLETE CBC W/AUTO DIFF WBC: CPT

## 2023-04-18 PROCEDURE — 83735 ASSAY OF MAGNESIUM: CPT

## 2023-04-18 PROCEDURE — 97110 THERAPEUTIC EXERCISES: CPT | Performed by: PHYSICAL THERAPIST

## 2023-04-18 PROCEDURE — 74011250636 HC RX REV CODE- 250/636: Performed by: NURSE PRACTITIONER

## 2023-04-18 PROCEDURE — 80048 BASIC METABOLIC PNL TOTAL CA: CPT

## 2023-04-18 PROCEDURE — 97161 PT EVAL LOW COMPLEX 20 MIN: CPT | Performed by: PHYSICAL THERAPIST

## 2023-04-18 PROCEDURE — 74011000250 HC RX REV CODE- 250: Performed by: INTERNAL MEDICINE

## 2023-04-18 PROCEDURE — 87324 CLOSTRIDIUM AG IA: CPT

## 2023-04-18 PROCEDURE — 74011250637 HC RX REV CODE- 250/637: Performed by: NURSE PRACTITIONER

## 2023-04-18 PROCEDURE — C9113 INJ PANTOPRAZOLE SODIUM, VIA: HCPCS | Performed by: INTERNAL MEDICINE

## 2023-04-18 PROCEDURE — 87506 IADNA-DNA/RNA PROBE TQ 6-11: CPT

## 2023-04-18 PROCEDURE — 74011250636 HC RX REV CODE- 250/636: Performed by: INTERNAL MEDICINE

## 2023-04-18 PROCEDURE — 84100 ASSAY OF PHOSPHORUS: CPT

## 2023-04-18 PROCEDURE — 74011250637 HC RX REV CODE- 250/637: Performed by: INTERNAL MEDICINE

## 2023-04-18 PROCEDURE — 36415 COLL VENOUS BLD VENIPUNCTURE: CPT

## 2023-04-18 RX ORDER — FAMOTIDINE 20 MG/1
20 TABLET, FILM COATED ORAL 2 TIMES DAILY
Qty: 60 TABLET | Refills: 0 | Status: SHIPPED | OUTPATIENT
Start: 2023-04-18 | End: 2023-05-18

## 2023-04-18 RX ORDER — VANCOMYCIN HYDROCHLORIDE 125 MG/1
125 CAPSULE ORAL 4 TIMES DAILY
Qty: 36 CAPSULE | Refills: 0 | Status: SHIPPED | OUTPATIENT
Start: 2023-04-18 | End: 2023-04-27

## 2023-04-18 RX ADMIN — SODIUM CHLORIDE, PRESERVATIVE FREE 10 ML: 5 INJECTION INTRAVENOUS at 13:15

## 2023-04-18 RX ADMIN — SODIUM CHLORIDE, PRESERVATIVE FREE 10 ML: 5 INJECTION INTRAVENOUS at 05:54

## 2023-04-18 RX ADMIN — HYDROXYCHLOROQUINE SULFATE 200 MG: 200 TABLET ORAL at 10:45

## 2023-04-18 RX ADMIN — VANCOMYCIN HYDROCHLORIDE 125 MG: KIT at 13:14

## 2023-04-18 RX ADMIN — ASPIRIN 81 MG: 81 TABLET, COATED ORAL at 10:45

## 2023-04-18 RX ADMIN — SODIUM CHLORIDE 100 ML/HR: 9 INJECTION, SOLUTION INTRAVENOUS at 10:53

## 2023-04-18 RX ADMIN — SULFASALAZINE 500 MG: 500 TABLET ORAL at 10:44

## 2023-04-18 RX ADMIN — VALSARTAN 320 MG: 160 TABLET, FILM COATED ORAL at 10:45

## 2023-04-18 RX ADMIN — AMLODIPINE BESYLATE 2.5 MG: 2.5 TABLET ORAL at 10:45

## 2023-04-18 RX ADMIN — CELECOXIB 200 MG: 100 CAPSULE ORAL at 10:45

## 2023-04-18 RX ADMIN — PANTOPRAZOLE SODIUM 40 MG: 40 INJECTION, POWDER, LYOPHILIZED, FOR SOLUTION INTRAVENOUS at 10:45

## 2023-04-18 RX ADMIN — VANCOMYCIN HYDROCHLORIDE 125 MG: KIT at 10:48

## 2023-04-18 NOTE — PROGRESS NOTES
End of Shift Note    Bedside shift change report given to Tami Isbell (oncoming nurse) by John Curling, RN (offgoing nurse). Report included the following information SBAR, Kardex, Intake/Output, MAR, and Recent Results    Shift worked:  Night     Shift summary and any significant changes:    Been up most of the night attempting to have a BM, had a small this morning, sent to the Lab. IV fluids dripping per [facility] and  is taking clear liquids. VS within normal range and Labs drawn this morning. No c/o of pain.    Concerns for physician to address:       Zone phone for oncoming shift:          Activity:  Activity Level: Bed Rest, Up with Assistance  Number times ambulated in hallways past shift: 0  Number of times OOB to chair past shift: 6    Cardiac:   Cardiac Monitoring: No      Cardiac Rhythm: Sinus Rhythm    Access:  Current line(s): PIV     Genitourinary:   Urinary status: voiding    Respiratory:   O2 Device: None (Room air)  Chronic home O2 use?: NO  Incentive spirometer at bedside: YES       GI:  Last Bowel Movement Date: 04/16/23  Current diet:  ADULT DIET Clear Liquid  Passing flatus: YES  Tolerating current diet: YES       Pain Management:   Patient states pain is manageable on current regimen: YES    Skin:  Martinez Score: 21  Interventions: float heels    Patient Safety:  Fall Score:    Interventions: gripper socks       Length of Stay:  Expected LOS: - - -  Actual LOS: 2      John Curling, RN

## 2023-04-18 NOTE — PROGRESS NOTES
Problem: Risk for Spread of Infection  Goal: Prevent transmission of infectious organism to others  Description: Prevent the transmission of infectious organisms to other patients, staff members, and visitors. Outcome: Resolved/Met     Problem: Patient Education:  Go to Education Activity  Goal: Patient/Family Education  Outcome: Resolved/Met     Problem: Pressure Injury - Risk of  Goal: *Prevention of pressure injury  Description: Document Martinez Scale and appropriate interventions in the flowsheet. Outcome: Resolved/Met     Problem: Patient Education: Go to Patient Education Activity  Goal: Patient/Family Education  Outcome: Resolved/Met     Problem: Falls - Risk of  Goal: *Absence of Falls  Description: Document Soraida Fall Risk and appropriate interventions in the flowsheet.   Outcome: Resolved/Met     Problem: Patient Education: Go to Patient Education Activity  Goal: Patient/Family Education  Outcome: Resolved/Met     Problem: Patient Education: Go to Patient Education Activity  Goal: Patient/Family Education  Outcome: Resolved/Met

## 2023-04-18 NOTE — PROGRESS NOTES
I have reviewed discharge instructions with the PATIENT PARENT GUARDIAN: patient and spouse. The patient and spouse verbalized understanding. Discharge medications reviewed with patient and spouse and appropriate educational materials and side effects teaching were provided. Follow-up appointments reviewed. Opportunity for questions and clarification was provided. Venous access removed without difficulty. Patient's belongings gathered and sent with patient. Patient is ready for discharge.      Judah Quan RN

## 2023-04-18 NOTE — PROGRESS NOTES
Physician Progress Note      Seferino Mack  CSN #:                  [id number]  :                       1955  ADMIT DATE:       2023 11:41 AM  DISCH DATE:  RESPONDING  PROVIDER #:        Brooks Landers NP          QUERY TEXT:    Pt admitted with Lower GIB, Acute colitis with gastritis, noted to have HGB of 9.2. Please document in the progress notes and discharge summary if you are evaluating and/or treating any of the following: The medical record reflects the following:    Risk Factors:  --Admitted 4 days post Rt TKA on 23 with minimal blood loss per OP note. --Pt presented to ED on  with c/o weakness, lightheadedness, n/v/d, abdominal pain, blood in stools. Symptoms have been present over the past 1 to 2 days.     Clinical Indicators: HGB 10.5 - 9.2 - 10.0   (historical data: HGB 13.4 back on 3/30/23)    Treatment:  HGB monitoring    Thank you,  Trinity Burkett RN, CDI  Options provided:  -- Acute blood loss anemia  -- Other - I will add my own diagnosis  -- Disagree - Not applicable / Not valid  -- Disagree - Clinically unable to determine / Unknown  -- Refer to Clinical Documentation Reviewer    PROVIDER RESPONSE TEXT:    No further bloody stools    Query created by: Gabbie Lowery on 2023 11:53 AM      Electronically signed by:  Brooks Landers NP 2023 12:02 PM

## 2023-04-18 NOTE — PROGRESS NOTES
Spoke with Palomo at [facility] microbiology lab about adding on Enteric DNA panel to C. Diff stool collected at 0709. Palomo stated that she can print labels for Enteric DNA, and that the lab technicians will start the lab run at around 3:30 to 4:30 pm. Irma NP made aware.

## 2023-04-18 NOTE — PROGRESS NOTES
Problem: Mobility Impaired (Adult and Pediatric)  Goal: *Acute Goals and Plan of Care (Insert Text)  Description: FUNCTIONAL STATUS PRIOR TO ADMISSION: The patient is 6 days post RTKR. She had outpatient surgery and 1 outpatient visit before being admitted. HOME SUPPORT PRIOR TO ADMISSION: The patient lived with santo  but did not require assist.    Physical Therapy Goals  Initiated 4/18/2023    1. Patient will ambulate with independence for 200 feet with the least restrictive device within 4 days. 2. Patient will ascend/descend 4 stairs with 1 handrail(s) with supervision/set-up within 4 days. 3. Patient will perform home exercise program per protocol with independence within 4 days. 4. Patient will demonstrate AROM 0-110 degrees in operative joint within 4 days. Outcome: Not Met     PHYSICAL THERAPY EVALUATION  Patient: Karyn Morales (78 y.o. female)  Date: 4/18/2023  Primary Diagnosis: Infectious colitis [A09]  Gastritis [K29.70]  Acute lower GI bleeding [K92.2]       Precautions:   Contact (WBAT R LE, POD 5 TKA)      ASSESSMENT  Based on the objective data described below, the patient presents with S/P 6 days post RTKR. The patient is on precautions and unable to leave the room to ambulate. She is up with the RW within the room. Her  is present. She is using the bathroom and sitting in the chair doing her exercises on arrival.  The room was rearranged and they were instructed in how to shorten the bed so she is able to make laps around the end of the bed. She is able to ambulate 40 feet with the RW and her gait is slightly antalgic. She has seated ex she is doing. Today she is instructed in standing ex at the sink in the room. She is able to do 10 x bilat heel raises, marching, hip abd, hip ext, knee flexion, and semi squats. She has good ROM of the knee. I don't have a goniometer but she appears to have 5 degrees-95 degrees of active ROM.   She is cooperative and motivated. We will continue to follow her to progress her ambulation off the RW, work on ROM and strengthening of the right knee, and work on progressive standing exercises as tolerated. Once she is able to ambulate in the hallway we can progress to that as well. .    Current Level of Function Impacting Discharge (mobility/balance): none    Functional Outcome Measure: The patient scored Total: 95/100 on the Barthel Index outcome measure which is indicative of being 5% in basic self-care. Other factors to consider for discharge: None     Patient will benefit from skilled therapy intervention to address the above noted impairments. PLAN :  Recommendations and Planned Interventions: gait training and therapeutic exercises      Frequency/Duration: Patient will be followed by physical therapy:  5 times a week to address goals. Recommendation for discharge: (in order for the patient to meet his/her long term goals)  Outpatient physical therapy follow up recommended for TKR protocol    This discharge recommendation:  Has not yet been discussed the attending provider and/or case management    IF patient discharges home will need the following DME: none         SUBJECTIVE:   Patient stated I don't want to get blood clots so I am moving as much as I can.     OBJECTIVE DATA SUMMARY:   HISTORY:    Past Medical History:   Diagnosis Date    Former smoker, stopped smoking in distant past     History of abdominal pain     History of echocardiogram 06/2012    Left ventricle: Ejection fraction was estimated to be 65 %. Mitral valve: There was mild regurgitation. Aortic valve: There was no stenosis. Tricuspid valve: There was mild regurgitation    History of left heart catheterization 06/2012    1. Angiographically normal coronary arteries with normal left ventricular function. 2. Mildly elevated left ventricular filling pressures. No aortic stenosis. Systemic arterial pressures within normal limits.  3. Normal left ventricular function with an estimated ejection fraction of 60% and no significant mitral regurgitation. 4. Angiographically normal coronary arteries in a right dominant system    History of melanoma     Hypercholesterolemia     Hypertension     Long term current use of immunosuppressive drug     Lumbar degenerative disc disease     L3-L4    Rheumatoid arthritis (HCC)     RX = Etanercept, Sulfsalazine, Plaquenil    Transaminitis     Vitamin D deficiency      Past Surgical History:   Procedure Laterality Date    COLONOSCOPY N/A 10/1/2021    COLONOSCOPY performed by Keri Valera MD at [address]; HI RISK IND  10/1/2021         HX GYN       X2    HX MALIGNANT SKIN LESION EXCISION      melanoma x2    HX OTHER SURGICAL      lymph node removed from Right arm benign    HX OTHER SURGICAL  2021    Left upper chest subcutaneous nodule excision       Personal factors and/or comorbidities impacting plan of care: None    Home Situation  Home Environment: Private residence  # Steps to Enter: 3  Rails to Enter: No  One/Two Story Residence: Other (Comment) (tri-level)  # of Interior Steps: 10 (10 down and 4 up)  [employer]: Both  Living Alone: No  Support Systems: Spouse/Significant Other  Patient Expects to be Discharged to[de-identified] Home  Current DME Used/Available at Home: Walker, rolling    EXAMINATION/PRESENTATION/DECISION MAKING:   Critical Behavior:  Neurologic State: Alert, Eyes open spontaneously  Orientation Level: Oriented X4  Cognition: Appropriate decision making, Appropriate for age attention/concentration, Follows commands     Hearing: Auditory  Auditory Impairment: None  Skin:  per nursing. Patient has a bandage over the right knee incision  Edema: per nursing. Patient had RLE edema  Range Of Motion:  AROM: Within functional limits           PROM: Within functional limits           Strength:    Strength:  Within functional limits                    Tone & Sensation:   Tone: Normal                              Coordination:  Coordination: Within functional limits  Vision:      Functional Mobility:  Bed Mobility:              Transfers:  Sit to Stand: Modified independent  Stand to Sit: Modified independent                       Balance:   Sitting: Intact  Standing: Intact; With support  Ambulation/Gait Training:  Distance (ft): 40 Feet (ft)  Assistive Device: Walker, rolling  Ambulation - Level of Assistance: Supervision        Gait Abnormalities: Decreased step clearance; Antalgic  Right Side Weight Bearing: As tolerated  Left Side Weight Bearing: Full  Base of Support: Narrowed; Shift to left                           Slightly antalgic gait. Stairs: Therapeutic Exercises:   See above note. Functional Measure:  Barthel Index:    Bathin  Bladder: 10  Bowels: 10  Groomin  Dressing: 10  Feeding: 10  Mobility: 15  Stairs: 10  Toilet Use: 10  Transfer (Bed to Chair and Back): 10  Total: 95/100       The Barthel ADL Index: Guidelines  1. The index should be used as a record of what a patient does, not as a record of what a patient could do. 2. The main aim is to establish degree of independence from any help, physical or verbal, however minor and for whatever reason. 3. The need for supervision renders the patient not independent. 4. A patient's performance should be established using the best available evidence. Asking the patient, friends/relatives and nurses are the usual sources, but direct observation and common sense are also important. However direct testing is not needed. 5. Usually the patient's performance over the preceding 24-48 hours is important, but occasionally longer periods will be relevant. 6. Middle categories imply that the patient supplies over 50 per cent of the effort. 7. Use of aids to be independent is allowed.     Score Interpretation (from [address] [place] [address])    Independent   60-79 Minimally independent   40-59 Partially dependent   20-39 Very dependent   <20 Totally dependent     -Lewis Kendrick, Barthel, D.W. (2250). Functional evaluation: the Barthel Index. [address] ([address] (1997). The Barthel activities of daily living index: self-reporting versus actual performance in the old (> or = 75 years). Journal of [address] [place] [address] 45(7), 14 [facility], J.VINCEM.F, Andrew Galvez., Leopold Covert. (1999). Measuring the change in disability after inpatient rehabilitation; comparison of the responsiveness of the Barthel Index and Functional [place] Measure. Journal of Neurology, Neurosurgery, and Psychiatry, 66(4), 373-422. Maricel Sutherland, NDORIS.LAMBERT, SUSY Jimenez, & Andrew Siegel M.A. (2004) Assessment of post-stroke quality of life in cost-effectiveness studies: The usefulness of the Barthel Index and the EuroQoL-5D. Quality of Life Research, 15, 379-79        Physical Therapy Evaluation Charge Determination   History Examination Presentation Decision-Making   LOW Complexity : Zero comorbidities / personal factors that will impact the outcome / POC LOW Complexity : 1-2 Standardized tests and measures addressing body structure, function, activity limitation and / or participation in recreation  LOW Complexity : Stable, uncomplicated  LOW Complexity : FOTO score of       Based on the above components, the patient evaluation is determined to be of the following complexity level: LOW       Activity Tolerance:   Good    After treatment patient left in no apparent distress:   Sitting in chair    COMMUNICATION/EDUCATION:   The patients plan of care was discussed with: Registered nurse. Fall prevention education was provided and the patient/caregiver indicated understanding., Patient/family have participated as able in goal setting and plan of care. , and Patient/family agree to work toward stated goals and plan of care.     Thank you for this referral.  Darylene Inks Rolan Clark, PT   Time Calculation: 30 mins

## 2023-04-18 NOTE — PROGRESS NOTES
GI PROGRESS NOTE  Hank Favre, NP  [phone] NP in-hospital cell phone M-F until 4:30  After 5pm or on weekends, please call  for physician on call    NAME:Maggie Espino :1955 WUN:[id number]   ATTG: Mariangel Farah NP  PCP: Bean Story MD  Date/Time:  2023 10:40 AM   Primary GI: Dr. Fawad Ribeiro   Reason for following: Diarrhea     Assessment:   Diarrhea  Rectal bleeding  Recent antibiotics for UTI and sinusitis   No leukocytosis   Hgb 10.0  CT abd/pel with marked colitis   Stool studies pending   Last CLN in  with internal hemorrhoids and diverticulosis    Plan:   Continue vancomycin to treat for C. Diff- vancomycin 125 mg PO QID for 10 days   Follow stool studies   Diet as tolerated   Continue PPI   Symptomatic care per primary team  OK to discharge from a GI standpoint once stool sample sent for enteric panel. Will schedule outpatient follow-up with Dr. Ann Millard of care discussed with Dr. Scooby Aguilar:   Discussed with RN events overnight. Patient resting in bed with  at bedside. Review of Systems:  Symptom Y/N Comments  Symptom Y/N Comments   Fever/Chills n   Chest Pain n    Cough n   Headaches n    Sputum n   Joint Pain n    SOB/MOSS n   Pruritis/Rash n    Tolerating Diet y   Other       Could NOT obtain due to:      Objective:   VITALS:   Last 24hrs VS reviewed since prior progress note. Most recent are:  Visit Vitals  BP (!) 142/71   Pulse 76   Temp 98.2 °F (36.8 °C)   Resp 18   Ht 5' 8.5\" (1.74 m)   Wt 79.4 kg (175 lb)   SpO2 100%   BMI 26.22 kg/m²       Intake/Output Summary (Last 24 hours) at 2023 1040  Last data filed at 2023  Gross per 24 hour   Intake 150 ml   Output --   Net 150 ml     PHYSICAL EXAM:  General: Pleasant  female. NAD  HEENT: NC, Atraumatic. Anicteric sclerae. Lungs:  CTA Bilaterally. No Wheezing/Rhonchi/Rales.   Heart:  Regular  rhythm,  No murmur, No Rubs, No Gallops  Abdomen: Soft, Non distended, Non tender. +Bowel sounds, no HSM  Extremities: No c/c/e- s/p knee surgery   Neurologic:  Alert and oriented X 3. No acute neurological distress   Psych:   Good insight. Not anxious nor agitated. Lab and Radiology Data Reviewed: (see below)    Medications Reviewed: (see below)  PMH/SH reviewed - no change compared to H&P  ________________________________________________________________________  Total time spent with patient: 15 minutes ________________________________________________________________________  Care Plan discussed with:  Patient x   Family     RN x              Consultant:       Girish Stokes NP     Procedures: see electronic medical records for all procedures/Xrays and details which were not copied into this note but were reviewed prior to creation of Plan. LABS:  Recent Labs     04/18/23 0427 04/17/23  0150   WBC 5.9 8.9   HGB 10.0* 9.2*   HCT 31.9* 28.1*    270     Recent Labs     04/18/23  0427 04/17/23  0150 04/16/23  1223   * 129* 128*   K 3.6 3.9 4.0    103 98   CO2 25 23 25   BUN 6 9 11   CREA 0.44* 0.40* 0.46*   GLU 89 94 95   CA 9.4 8.6 9.6   MG 2.1  --   --    PHOS 2.1*  --   --      Recent Labs     04/16/23  1223   AP 71   TP 7.3   ALB 3.4*   GLOB 3.9   LPSE 81     No results for input(s): INR, PTP, APTT, INREXT in the last 72 hours. No results for input(s): FE, TIBC, PSAT, FERR in the last 72 hours. Lab Results   Component Value Date/Time    Folate 16.3 06/25/2012 04:06 AM     No results for input(s): PH, PCO2, PO2 in the last 72 hours. No results for input(s): CPK, CKMB in the last 72 hours.     No lab exists for component: TROPONINI  Lab Results   Component Value Date/Time    Color YELLOW/STRAW 04/16/2023 12:49 PM    Appearance CLEAR 04/16/2023 12:49 PM    Specific gravity 1.010 04/16/2023 12:49 PM    Specific gravity 1.020 08/21/2019 03:28 PM    pH (UA) >8.5 (H) 04/16/2023 12:49 PM    Protein Negative 04/16/2023 12:49 PM    Glucose Negative 04/16/2023 12:49 PM    Ketone 40 (A) 04/16/2023 12:49 PM    Bilirubin Negative 04/16/2023 12:49 PM    Urobilinogen 1.0 04/16/2023 12:49 PM    Nitrites Negative 04/16/2023 12:49 PM    Leukocyte Esterase Negative 04/16/2023 12:49 PM    Epithelial cells MODERATE (A) 04/16/2023 12:49 PM    Bacteria Negative 04/16/2023 12:49 PM    WBC 0-4 04/16/2023 12:49 PM    RBC 0-5 04/16/2023 12:49 PM       MEDICATIONS:  Current Facility-Administered Medications   Medication Dose Route Frequency    aspirin delayed-release tablet 81 mg  81 mg Oral BID    celecoxib (CELEBREX) capsule 200 mg  200 mg Oral BID    vancomycin (FIRVANQ) 50 mg/mL oral solution 125 mg  125 mg Oral QID    melatonin tablet 1.5 mg  1.5 mg Oral QHS PRN    pantoprazole (PROTONIX) 40 mg in 0.9% sodium chloride 10 mL injection  40 mg IntraVENous Q12H    fluticasone propionate (FLONASE) 50 mcg/actuation nasal spray 2 Spray  2 Spray Both Nostrils DAILY    hydrOXYchloroQUINE (PLAQUENIL) tablet 200 mg  200 mg Oral BID    sulfaSALAzine (AZULFIDINE) tablet 500 mg  500 mg Oral BID    0.9% sodium chloride infusion  100 mL/hr IntraVENous CONTINUOUS    sodium chloride (NS) flush 5-40 mL  5-40 mL IntraVENous Q8H    sodium chloride (NS) flush 5-40 mL  5-40 mL IntraVENous PRN    acetaminophen (TYLENOL) tablet 650 mg  650 mg Oral Q6H PRN    Or    acetaminophen (TYLENOL) suppository 650 mg  650 mg Rectal Q6H PRN    polyethylene glycol (MIRALAX) packet 17 g  17 g Oral DAILY PRN    ondansetron (ZOFRAN ODT) tablet 4 mg  4 mg Oral Q8H PRN    Or    ondansetron (ZOFRAN) injection 4 mg  4 mg IntraVENous Q6H PRN    traMADoL (ULTRAM) tablet 50 mg  50 mg Oral Q6H PRN    hydrALAZINE (APRESOLINE) 20 mg/mL injection 10 mg  10 mg IntraVENous Q6H PRN    amLODIPine (NORVASC) tablet 2.5 mg  2.5 mg Oral DAILY    valsartan (DIOVAN) tablet 320 mg  320 mg Oral DAILY

## 2023-04-18 NOTE — PROGRESS NOTES
Transition of Care Plan:    RUR: 8%  Disposition: Home with Family support   DME needed: N/A  Transportation at Discharge: Family to transport   [address] or means to access home:      Family to provide  IM Medicare Letter: 2nd IM Medicare Letter to be given  Is patient a [place] and connected with the [facility]? N/A             If yes, was [place] transfer form completed and [facility] notified? Caregiver Contact: Kary Willis (spouse) [phone]  Discharge Caregiver contacted prior to discharge? Family to be contacted  Care Conference needed?:     Not at this time       CM aware that pt will d/c on today. CM completed room visit with pt, to review todays d/c. Pt reported that spouse will transport pt home on tdoday. Pt denies any needs at this time. Pt received and signed 2nd  Medicare Letter.   CM completed the need of the pt at this time    CHARLENE Mirza, [address] [place] [address]

## 2023-04-18 NOTE — DISCHARGE SUMMARY
Hospitalist Discharge Summary     Patient ID:    Eunice Vela  [id number]  78 y.o.  1955    Admit date: 4/16/2023    Discharge date : 4/18/2023    Chronic Diagnoses:    Problem List as of 4/18/2023 Date Reviewed: 4/4/2023            Codes Class Noted - Resolved    Adverse drug reaction ICD-10-CM: T50.905A  ICD-9-CM: E947.9  6/19/2012 - Present        Infectious colitis ICD-10-CM: A09  ICD-9-CM: 009.0  4/16/2023 - Present        Gastritis ICD-10-CM: K29.70  ICD-9-CM: 535.50  4/16/2023 - Present        Acute lower GI bleeding ICD-10-CM: K92.2  ICD-9-CM: 578.9  4/16/2023 - Present        Long term current use of immunosuppressive drug ICD-10-CM: Z79.899  ICD-9-CM: V58.69  Unknown - Present        Transaminitis ICD-10-CM: R74.01  ICD-9-CM: 790.4  Unknown - Present        Hypertension ICD-10-CM: I10  ICD-9-CM: 401.9  Unknown - Present        History of abdominal pain ICD-10-CM: Z87.898  ICD-9-CM: V13.89  Unknown - Present        Former smoker, stopped smoking in distant past ICD-10-CM: Z87.891  ICD-9-CM: V15.82  Unknown - Present        Melanoma of thoracic region [facility]) ICD-10-CM: C43.59  ICD-9-CM: 172.5  5/25/2022 - Present        Vitamin D deficiency ICD-10-CM: E55.9  ICD-9-CM: 268.9  Unknown - Present        Rheumatoid arthritis ([facility] [address] ICD-10-CM: M06.9  ICD-9-CM: 714.0  6/18/2012 - Present        Abdominal pain ICD-10-CM: R10.9  ICD-9-CM: 789.00  6/18/2012 - Present        Elevated liver function tests ICD-10-CM: R79.89  ICD-9-CM: 790.6  6/18/2012 - Present        Sepsis, unspecified ICD-10-CM: A41.9  ICD-9-CM: 995.91  6/18/2012 - Present        History of melanoma ICD-10-CM: Z85.820  ICD-9-CM: V10.82  6/18/2012 - Present        HTN (hypertension) ICD-10-CM: I10  ICD-9-CM: 401.9  6/18/2012 - Present       22    Final Diagnoses:    Active Problems:    Infectious colitis (4/16/2023)      Gastritis (4/16/2023)      Acute lower GI bleeding (4/16/2023)        Reason for Hospitalization: Infectious colitis, gastritis, and acute lower GIB. Subsequent diagnosis:  Mild hyponatremia, HTN, History of RA, and HTN. Hospital Course: Ms. Cristina Gallardo is a 78year old female who presented to the emergency room with nausea, vomiting, abdominal cramps and diarrhea for the past couple of days accompanied by generalized weakness. Recent history of right total knee replacement approximately 5 days ago. Discharged on aspirin for DVT prophylaxis and celecoxib for pain management. History of E. Coli UTI, patient was treated with antibiotics outpatient. Patient was found to have extensive colitis on CT imaging along with gastritis with unremarkable blood work except mild leukocytosis at 11.5 with negative UA rapid flu and rapid COVID tests in the emergency room. Patient Hgb level remained stable during hospitalization. No further episodes of GIB reported. The patient was started on oral vancomycin per GI for treatment symptoms consistent with cdiff for a total of 10 day course. Cdiff and enteric bacteria panel pending. Patient to follow up with GI for results in 3 days. Nausea, vomiting, and diarrhea did improve with IVF and medication therapy. Patient is a recent right total knee [facility] approximately 6 days ago. Ok to restart aspirin and celebrex per GI. Patient also to continue medications for HTN and RA at discharge. Patient is clear for discharge on today in stable condition with outpatient follow up. Discharge Medications:   Current Discharge Medication List        START taking these medications    Details   vancomycin (VANCOCIN) 125 mg capsule Take 1 Capsule by mouth four (4) times daily for 9 days. Qty: 36 Capsule, Refills: 0  Start date: 4/18/2023, End date: 4/27/2023           CONTINUE these medications which have NOT CHANGED    Details   aspirin delayed-release 81 mg tablet Take 1 Tablet by mouth two (2) times a day.   Qty: 60 Tablet, Refills: 0      celecoxib (CELEBREX) 200 mg capsule Take 1 Capsule by mouth two (2) times a day. cholecalciferol, vitamin d3, 250 mcg (10,000 unit) cap Take 1,000 Units by mouth daily. famotidine (PEPCID) 20 mg tablet Take 1 Tablet by mouth bid. Qty: 60 Tablet, Refills: 0      traMADoL (ULTRAM) 50 mg tablet Take 1-2 Tablets by mouth every eight (8) hours as needed. sod bicarb-sod chlor-neti pot ([place] Saline Nasal Neti Rinse) pkdv 1 Each by sinus irrigation route three (3) times daily. Qty: 50 Each, Refills: 1    Associated Diagnoses: Postnasal drip      fluticasone propionate (FLONASE) 50 mcg/actuation nasal spray 2 Sprays by Both Nostrils route daily. Indications: chronic stuffy and runny nose not caused by allergies  Qty: 1 Each, Refills: 1    Associated Diagnoses: Postnasal drip      etanercept (ENBREL) 50 mg/mL (0.98 mL) injection 0.98 mL by SubCUTAneous route every seven (7) days. Indications: rheumatoid arthritis      sulfaSALAzine (AZULFIDINE) 500 mg tablet Take 1 Tablet by mouth two (2) times a day. Take two tablets twice daily   Indications: rheumatoid arthritis      hydroxychloroquine (PLAQUINIL) 200 mg tablet Take 1 Tablet by mouth two (2) times a day. Take one tablet twice daily      acetaminophen (TYLENOL) 500 mg tablet Take 2 Tablets by mouth every six (6) hours as needed.         valsartan (DIOVAN) 320 mg tablet TAKE ONE TABLET BY MOUTH DAILY  Qty: 90 Tablet, Refills: 0  Start date: 4/17/2023      amLODIPine (NORVASC) 2.5 mg tablet TAKE ONE TABLET BY MOUTH DAILY  Qty: 90 Tablet, Refills: 0  Start date: 4/17/2023           STOP taking these medications       cefadroxil (DURICEF) 500 mg capsule Comments:   Reason for Stopping:         ketorolac (TORADOL) 10 mg tablet Comments:   Reason for Stopping:         nitrofurantoin, macrocrystal-monohydrate, (MACROBID) 100 mg capsule Comments:   Reason for Stopping:         oxyCODONE IR (ROXICODONE) 5 mg immediate release tablet Comments:   Reason for Stopping: Follow up Care:    1. Bean Story MD in 1-2 weeks. Follow-up Information       Follow up With Specialties Details Why Sommer Dangelo MD Gastroenterology Schedule an appointment as soon as possible for a visit in 3-5 days. [address]  [address] [facility]      Bean Story MD Internal Medicine Physician Schedule an appointment as soon as possible for a visit in 1 week(s) post hospitalization follow up [address]      Bean Story MD Internal Medicine Physician   [address]  [facility]. [phone]      Please follow up with Orthopedic Physician  Schedule an appointment as soon as possible for a visit in 1 week(s)  Please follow up with Orthopedic Physician regarding recent right knee surgery. * Follow-up Care/Patient Instructions: Activity: Activity as tolerated with walker  Diet: Regular Diet  Wound Care: Keep dressing intact to right knee until seen by Orthopedic Physician. Condition at Discharge:  Stable  __________________________________________________________________    Disposition  Home or Self Care  ____________________________________________________________________    Code Status:  Full Code  ___________________________________________________________________    Discharge Exam:  Patient seen and examined by me on discharge day.   Physical Exam     CONSULTATIONS: GI    Significant Diagnostic Studies:   Recent Results (from the past 24 hour(s))   CBC WITH AUTOMATED DIFF    Collection Time: 04/18/23  4:27 AM   Result Value Ref Range    WBC 5.9 3.6 - 11.0 K/uL    RBC 3.39 (L) 3.80 - 5.20 M/uL    HGB 10.0 (L) 11.5 - 16.0 g/dL    HCT 31.9 (L) 35.0 - 47.0 %    MCV 94.1 80.0 - 99.0 FL    MCH 29.5 26.0 - 34.0 PG    MCHC 31.3 30.0 - 36.5 g/dL    RDW 12.3 11.5 - 14.5 %    PLATELET 754 309 - 705 K/uL    MPV 8.0 (L) 8.9 - 12.9 FL    NRBC 0.0 0  WBC ABSOLUTE NRBC 0.00 0.00 - 0.01 K/uL    NEUTROPHILS 63 32 - 75 %    LYMPHOCYTES 17 12 - 49 %    MONOCYTES 13 5 - 13 %    EOSINOPHILS 5 0 - 7 %    BASOPHILS 1 0 - 1 %    IMMATURE GRANULOCYTES 1 (H) 0.0 - 0.5 %    ABS. NEUTROPHILS 3.8 1.8 - 8.0 K/UL    ABS. LYMPHOCYTES 1.0 0.8 - 3.5 K/UL    ABS. MONOCYTES 0.7 0.0 - 1.0 K/UL    ABS. EOSINOPHILS 0.3 0.0 - 0.4 K/UL    ABS. BASOPHILS 0.0 0.0 - 0.1 K/UL    ABS. IMM. GRANS. 0.0 0.00 - 0.04 K/UL    DF AUTOMATED     METABOLIC PANEL, BASIC    Collection Time: 04/18/23  4:27 AM   Result Value Ref Range    Sodium 133 (L) 136 - 145 mmol/L    Potassium 3.6 3.5 - 5.1 mmol/L    Chloride 103 97 - 108 mmol/L    CO2 25 21 - 32 mmol/L    Anion gap 5 5 - 15 mmol/L    Glucose 89 65 - 100 mg/dL    BUN 6 6 - 20 MG/DL    Creatinine 0.44 (L) 0.55 - 1.02 MG/DL    BUN/Creatinine ratio 14 12 - 20      eGFR >60 >60 ml/min/1.73m2    Calcium 9.4 8.5 - 10.1 MG/DL   MAGNESIUM    Collection Time: 04/18/23  4:27 AM   Result Value Ref Range    Magnesium 2.1 1.6 - 2.4 mg/dL   PHOSPHORUS    Collection Time: 04/18/23  4:27 AM   Result Value Ref Range    Phosphorus 2.1 (L) 2.6 - 4.7 MG/DL     XR CHEST PORT   Final Result   No acute cardiopulmonary findings. CT ABD PELV W CONT   Final Result      Marked colitis   Gastritis   Left lower renal pole nonobstructing calculus      XR KNEE RT MAX 2 VWS   Final Result   Two views of the right knee demonstrate intra-articular gas and   trace soft tissue gas. This should be correlated with the timing of knee   replacement. No fracture or dislocation. Discharge: time spent less than 30 minutes in discharge  Education and counseling.      Signed:  Renee Queen NP  4/18/2023  12:24 PM

## 2023-04-18 NOTE — PROGRESS NOTES
Hospitalist Progress Note    Subjective:   Daily Progress Note: 4/18/2023 12:06 PM    Hospital Course: Ms. Alanna Jiang is a 78year old female who presented to the emergency room with nausea, vomiting, abdominal cramps and diarrhea for the past couple of days accompanied by generalized weakness. Recent history of right total knee replacement approximately 5 days ago. Discharged on aspirin for DVT prophylaxis and celecoxib for pain management. History of E. Coli UTI, patient was treated with antibiotics outpatient. Patient was found to have extensive colitis on CT imaging along with gastritis with unremarkable blood work except mild leukocytosis at 11.5 with negative UA rapid flu and rapid COVID tests in the emergency room. Subjective: Patient observed resting in chair with eyes opened. Denies complaints. No acute distress observed.     Current Facility-Administered Medications   Medication Dose Route Frequency    aspirin delayed-release tablet 81 mg  81 mg Oral BID    celecoxib (CELEBREX) capsule 200 mg  200 mg Oral BID    vancomycin (FIRVANQ) 50 mg/mL oral solution 125 mg  125 mg Oral QID    melatonin tablet 1.5 mg  1.5 mg Oral QHS PRN    pantoprazole (PROTONIX) 40 mg in 0.9% sodium chloride 10 mL injection  40 mg IntraVENous Q12H    fluticasone propionate (FLONASE) 50 mcg/actuation nasal spray 2 Spray  2 Spray Both Nostrils DAILY    hydrOXYchloroQUINE (PLAQUENIL) tablet 200 mg  200 mg Oral BID    sulfaSALAzine (AZULFIDINE) tablet 500 mg  500 mg Oral BID    0.9% sodium chloride infusion  100 mL/hr IntraVENous CONTINUOUS    sodium chloride (NS) flush 5-40 mL  5-40 mL IntraVENous Q8H    sodium chloride (NS) flush 5-40 mL  5-40 mL IntraVENous PRN    acetaminophen (TYLENOL) tablet 650 mg  650 mg Oral Q6H PRN    Or    acetaminophen (TYLENOL) suppository 650 mg  650 mg Rectal Q6H PRN    polyethylene glycol (MIRALAX) packet 17 g  17 g Oral DAILY PRN    ondansetron (ZOFRAN ODT) tablet 4 mg  4 mg Oral Q8H PRN    Or ondansetron (ZOFRAN) injection 4 mg  4 mg IntraVENous Q6H PRN    traMADoL (ULTRAM) tablet 50 mg  50 mg Oral Q6H PRN    hydrALAZINE (APRESOLINE) 20 mg/mL injection 10 mg  10 mg IntraVENous Q6H PRN    amLODIPine (NORVASC) tablet 2.5 mg  2.5 mg Oral DAILY    valsartan (DIOVAN) tablet 320 mg  320 mg Oral DAILY        Review of Systems:  Review of Systems   Constitutional:  Positive for malaise/fatigue. HENT: Negative. Eyes: Negative. Respiratory: Negative. Cardiovascular: Negative. Gastrointestinal: Negative. Genitourinary: Negative. Musculoskeletal: Negative. Skin: Negative. Neurological: Negative. Endo/Heme/Allergies: Negative. Psychiatric/Behavioral: Negative. Objective:     Visit Vitals  BP (!) 142/71   Pulse 76   Temp 98.2 °F (36.8 °C)   Resp 18   Ht 5' 8.5\" (1.74 m)   Wt 79.4 kg (175 lb)   SpO2 100%   BMI 26.22 kg/m²      O2 Device: None (Room air)    Temp (24hrs), Av.2 °F (36.8 °C), Min:98.2 °F (36.8 °C), Max:98.2 °F (36.8 °C)      No intake/output data recorded.  1901 -  0700  In: 150 [P.O.:150]  Out: -     PHYSICAL EXAM:  Physical Exam  Constitutional:       Appearance: Normal appearance. HENT:      Head: Normocephalic and atraumatic. Right Ear: External ear normal.      Left Ear: External ear normal.      Nose: Nose normal.      Mouth/Throat:      Pharynx: Oropharynx is clear. Eyes:      Conjunctiva/sclera: Conjunctivae normal.   Cardiovascular:      Rate and Rhythm: Normal rate and regular rhythm. Pulses: Normal pulses. Heart sounds: Normal heart sounds. Pulmonary:      Effort: Pulmonary effort is normal.      Breath sounds: Normal breath sounds. Abdominal:      General: Bowel sounds are normal.   Musculoskeletal:      Cervical back: Normal range of motion. Comments: Limited ROM to right knee. No warmth or erythema noted. Dressing is clean, dry and intact. Skin:     General: Skin is warm and dry.       Capillary Refill: Capillary refill takes 2 to 3 seconds. Comments: Right knee without warmth, erythema. No bleeding, drainage or oozing noted to dressing. Neurological:      Mental Status: She is alert and oriented to person, place, and time. Psychiatric:         Mood and Affect: Mood normal.       Data Review    Recent Results (from the past 24 hour(s))   CBC WITH AUTOMATED DIFF    Collection Time: 04/18/23  4:27 AM   Result Value Ref Range    WBC 5.9 3.6 - 11.0 K/uL    RBC 3.39 (L) 3.80 - 5.20 M/uL    HGB 10.0 (L) 11.5 - 16.0 g/dL    HCT 31.9 (L) 35.0 - 47.0 %    MCV 94.1 80.0 - 99.0 FL    MCH 29.5 26.0 - 34.0 PG    MCHC 31.3 30.0 - 36.5 g/dL    RDW 12.3 11.5 - 14.5 %    PLATELET 965 641 - 976 K/uL    MPV 8.0 (L) 8.9 - 12.9 FL    NRBC 0.0 0  WBC    ABSOLUTE NRBC 0.00 0.00 - 0.01 K/uL    NEUTROPHILS 63 32 - 75 %    LYMPHOCYTES 17 12 - 49 %    MONOCYTES 13 5 - 13 %    EOSINOPHILS 5 0 - 7 %    BASOPHILS 1 0 - 1 %    IMMATURE GRANULOCYTES 1 (H) 0.0 - 0.5 %    ABS. NEUTROPHILS 3.8 1.8 - 8.0 K/UL    ABS. LYMPHOCYTES 1.0 0.8 - 3.5 K/UL    ABS. MONOCYTES 0.7 0.0 - 1.0 K/UL    ABS. EOSINOPHILS 0.3 0.0 - 0.4 K/UL    ABS. BASOPHILS 0.0 0.0 - 0.1 K/UL    ABS. IMM. GRANS. 0.0 0.00 - 0.04 K/UL    DF AUTOMATED     METABOLIC PANEL, BASIC    Collection Time: 04/18/23  4:27 AM   Result Value Ref Range    Sodium 133 (L) 136 - 145 mmol/L    Potassium 3.6 3.5 - 5.1 mmol/L    Chloride 103 97 - 108 mmol/L    CO2 25 21 - 32 mmol/L    Anion gap 5 5 - 15 mmol/L    Glucose 89 65 - 100 mg/dL    BUN 6 6 - 20 MG/DL    Creatinine 0.44 (L) 0.55 - 1.02 MG/DL    BUN/Creatinine ratio 14 12 - 20      eGFR >60 >60 ml/min/1.73m2    Calcium 9.4 8.5 - 10.1 MG/DL   MAGNESIUM    Collection Time: 04/18/23  4:27 AM   Result Value Ref Range    Magnesium 2.1 1.6 - 2.4 mg/dL   PHOSPHORUS    Collection Time: 04/18/23  4:27 AM   Result Value Ref Range    Phosphorus 2.1 (L) 2.6 - 4.7 MG/DL       XR CHEST PORT   Final Result   No acute cardiopulmonary findings. CT ABD PELV W CONT   Final Result      Marked colitis   Gastritis   Left lower renal pole nonobstructing calculus      XR KNEE RT MAX 2 VWS   Final Result   Two views of the right knee demonstrate intra-articular gas and   trace soft tissue gas. This should be correlated with the timing of knee   replacement. No fracture or dislocation. Active Problems:    Infectious colitis (4/16/2023)      Gastritis (4/16/2023)      Acute lower GI bleeding (4/16/2023)        Assessment/Plan:   Acute colitis with gastritis - POA        Nausea and vomiting - resolved  - etiology possibly infectious in nature  - patient reports blood with mucus after diarrhea prior to admission, no further bleeding episodes reported. - CT of abdomen/pelvis positive for extensive colitis with gastritis, incidental non-obstructive kidney stones noted. - rapid flu and COVID test negative  - chest xray negative  - urinalysis negative for nitrites, leukocytes, and bacteria  - Hgb 10.0  - continue PPI  - continue oral vancomycin per GI recommendations for treatment of cdiff for 9- day course  - cdiff and enteric bacteria panel pending, follow up with GI as outpatient for results. - GI has signed-off with outpatient follow up. 2. Recent right total knee [facility]- 6 days ago  - continue aspirin per GI for DVT prophylaxis  - continue celebrex  - PT/OT  - patient to follow up with Orthopedics in 1 week, to keep dressing in place until seen by ortho physician. 3. Mild Hyponatremia  - etiology secondary to nausea, vomiting, and diarrhea which has resolved  - encourage po intake at discharge     4.  HTN      History of RA      Prior history of smoking  - continue sulfasalazine, hydroxychloroquine  - avoid NSAIDs  - continue norvasc and diovan     Medical Decision Making:     Labs reviewed by myself   CBC and BMP     Diagnostic data reviewed by myself   CT abdomen pelvis and urinalysis     Toxic drug monitoring Discussed case with   nursing     MDM Discussion   Ms. Kori Ramírez is a 78year old female who presented to the emergency room with nausea, vomiting, abdominal cramps and diarrhea for the past couple of days accompanied by generalized weakness. Patient was followed by GI for probable c-diff, acute colitis with gastritis, no active GI bleeding.   Will discharge today with outpatient follow up with GI.      DVT Prophylaxis: aspirin   Code Status:  Full Code  POA:  Cristina Duffy, spouse [phone]     Care Plan discussed with: patient, nursing, CM        _______________________________________________________________    Governor Ready, NP

## 2023-04-19 ENCOUNTER — PATIENT OUTREACH (OUTPATIENT)
Dept: CASE MANAGEMENT | Age: 68
End: 2023-04-19

## 2023-04-19 NOTE — PROGRESS NOTES
Care Transitions Initial Call    Call within 2 business days of discharge: Yes     Patient Current Location: [place]    Patient: [place] Patient : 1955 MRN: [id number]    Last Discharge  [address]       Date Complaint Diagnosis Description Type Department Provider    23 Vomiting Colitis . .. ED to Hosp-Admission (Discharged) (ADMIT) [facility], [address] Conrad Zarco, NP; [address] .. Was this an external facility discharge? No     Challenges to be reviewed by the provider   Additional needs identified to be addressed with provider:   Negative Cdiff and enteric bacteria panel              Method of communication with provider : none    Discussed COVID-19 related testing which was available at this time. Test results were negative. Patient informed of results, if available? yes     Advance Care Planning:   Does patient have an Advance Directive: not on file; education provided. Inpatient Readmission Risk score: Unplanned Readmit Risk Score: 8    Was this a readmission? no   Patient stated reason for the admission: vomiting    Patients top risk factors for readmission: medical condition-colitits    Interventions to address risk factors: Scheduled appointment with PCP-pt will arrange, Scheduled appointment with Miguel Prieto will arrange fu with GI, Obtained and reviewed discharge summary and/or continuity of care documents, and OP PT to begin next Tuesday    Care Transition Nurse (CTN) contacted the patient by telephone to perform post hospital discharge assessment. Verified name and  with patient as identifiers. Provided introduction to self, and explanation of the CTN role. Reports feeling better with fatigue. Denies chest pain, n/v, sob, fever. Reports she slept well last night. No bladder bowel concerns. Tolerating solid foods. Ambulating with walker    CTN reviewed discharge instructions, medical action plan and red flags with patient who verbalized understanding.  Were discharge instructions available to patient? yes. Reviewed appropriate site of care based on symptoms and resources available to patient including: PCP, Specialist, and OP PT . Patient given an opportunity to ask questions and does not have any further questions or concerns at this time. The patient agrees to contact the PCP office for questions related to their healthcare. Medication reconciliation was performed with patient, who verbalizes understanding of administration of home medications. Referral to Pharm D needed: no     Home Health/Outpatient orders at discharge: none    Durable Medical Equipment ordered at discharge: None    Was patient discharged with a pulse oximeter? no    Discussed follow-up appointments. If no appointment was previously scheduled, appointment scheduling offered: yes, pt declined. Is follow up appointment scheduled within 7 days of discharge? no.   [facility] follow up appointment(s):   Future Appointments   Date Time Provider Luzma Gonsalez   5/31/2023  8:30 AM Mat Ngo MD [facility] AMB   7/13/2023  1:30 PM JOSÉ LUIS Bustillo [facility] AMB     Non-BS follow up appointment(s): 4/27 Dr. Laureano Madison, lloy    Plan for follow-up call in 5-7 days based on severity of symptoms and risk factors. Plan for next call: self management-tolerating PO, follow up appointment-gi, medication management-abx, and community resources-OP PT  CTN provided contact information for future needs. Goals Addressed                   This Visit's Progress     Prevent complications post hospitalization.           04/19/23  Absence of falls  Will arrange fu appts with Dr. Perfecto Mariee, GI  Will attend fu appt with ortho scheduled 4/27  Will complete abx therapy  Will have reg BMs

## 2023-04-21 DIAGNOSIS — E87.1 HYPONATREMIA: Primary | ICD-10-CM

## 2023-04-25 ENCOUNTER — OFFICE VISIT (OUTPATIENT)
Dept: INTERNAL MEDICINE CLINIC | Age: 68
End: 2023-04-25
Payer: MEDICARE

## 2023-04-25 VITALS
WEIGHT: 172 LBS | SYSTOLIC BLOOD PRESSURE: 130 MMHG | OXYGEN SATURATION: 98 % | TEMPERATURE: 98.2 F | HEART RATE: 74 BPM | RESPIRATION RATE: 18 BRPM | BODY MASS INDEX: 25.48 KG/M2 | HEIGHT: 69 IN | DIASTOLIC BLOOD PRESSURE: 78 MMHG

## 2023-04-25 DIAGNOSIS — I10 PRIMARY HYPERTENSION: ICD-10-CM

## 2023-04-25 DIAGNOSIS — K52.9 COLITIS: Primary | ICD-10-CM

## 2023-04-25 DIAGNOSIS — I10 ESSENTIAL HYPERTENSION: ICD-10-CM

## 2023-04-25 DIAGNOSIS — M06.9 RHEUMATOID ARTHRITIS INVOLVING MULTIPLE SITES, UNSPECIFIED WHETHER RHEUMATOID FACTOR PRESENT (HCC): ICD-10-CM

## 2023-04-25 PROCEDURE — G8427 DOCREV CUR MEDS BY ELIG CLIN: HCPCS | Performed by: INTERNAL MEDICINE

## 2023-04-25 PROCEDURE — G8417 CALC BMI ABV UP PARAM F/U: HCPCS | Performed by: INTERNAL MEDICINE

## 2023-04-25 PROCEDURE — 3075F SYST BP GE 130 - 139MM HG: CPT | Performed by: INTERNAL MEDICINE

## 2023-04-25 PROCEDURE — G8432 DEP SCR NOT DOC, RNG: HCPCS | Performed by: INTERNAL MEDICINE

## 2023-04-25 PROCEDURE — 1101F PT FALLS ASSESS-DOCD LE1/YR: CPT | Performed by: INTERNAL MEDICINE

## 2023-04-25 PROCEDURE — 1090F PRES/ABSN URINE INCON ASSESS: CPT | Performed by: INTERNAL MEDICINE

## 2023-04-25 PROCEDURE — G8536 NO DOC ELDER MAL SCRN: HCPCS | Performed by: INTERNAL MEDICINE

## 2023-04-25 PROCEDURE — G9899 SCRN MAM PERF RSLTS DOC: HCPCS | Performed by: INTERNAL MEDICINE

## 2023-04-25 PROCEDURE — 1123F ACP DISCUSS/DSCN MKR DOCD: CPT | Performed by: INTERNAL MEDICINE

## 2023-04-25 PROCEDURE — 1111F DSCHRG MED/CURRENT MED MERGE: CPT | Performed by: INTERNAL MEDICINE

## 2023-04-25 PROCEDURE — 3017F COLORECTAL CA SCREEN DOC REV: CPT | Performed by: INTERNAL MEDICINE

## 2023-04-25 PROCEDURE — 3078F DIAST BP <80 MM HG: CPT | Performed by: INTERNAL MEDICINE

## 2023-04-25 PROCEDURE — 99213 OFFICE O/P EST LOW 20 MIN: CPT | Performed by: INTERNAL MEDICINE

## 2023-04-25 PROCEDURE — G8399 PT W/DXA RESULTS DOCUMENT: HCPCS | Performed by: INTERNAL MEDICINE

## 2023-04-25 RX ORDER — VALSARTAN 320 MG/1
320 TABLET ORAL DAILY
Qty: 90 TABLET | Refills: 3 | Status: SHIPPED | OUTPATIENT
Start: 2023-04-25

## 2023-04-25 NOTE — PROGRESS NOTES
Physician Progress Note      Rex Briones  CSN #:                  [id number]  :                       1955  ADMIT DATE:       2023 11:41 AM  DISCH DATE:        2023 2:00 PM  RESPONDING  PROVIDER #:        Héctor Gilliland NP          QUERY TEXT:    Patient presented with nausea, vomiting, diarrhea, and abdominal pain and was diagnosed with colitis. DS active problem list documents \" Infectious colitis\" and  GI pn \" C.diff colitis until proven otherwise\"    Please clarify and document the type of colitis in the medical record. The medical record reflects the following:    Risk Factors:  noted to have had recent antibiotic therapy PTA    Clinical Indicators:  WBC 11.5 on admission down to 5.9 on DC  - CT of the abdomen/pelvis with Marked colitis and Gastritis  - C Diff AG and toxin test was negative  - Enteric bacteria panel negative  - Discharge summary has \"Infectious colitis \" under final dx, and Pt was started on oral Vancomycin per GI for treatment Sx consistent with cdiff for a total of 10d under the Hospital course. Treatment: GI consult, Flagyl IV x2 days, Rocephin IV x1, Vancomycin PO qid and was to be discharged on Vancomycin PO. Thank you  Options provided:  -- Bacterial Colitis  -- Probable CDiff colitis at the time of discharge  -- Infectious colitis  -- Colitis due to other etiology, Please document other etiology. -- Other - I will add my own diagnosis  -- Disagree - Not applicable / Not valid  -- Disagree - Clinically unable to determine / Unknown  -- Refer to Clinical Documentation Reviewer    PROVIDER RESPONSE TEXT:    This patient had probable CDiff colitis at the time of discharge. Query created by: Eric Henry on 2023 8:32 AM      QUERY TEXT:    Patient presented with weakness, lightheadedness, nausea, vomiting, diarrhea, abdominal pain, and blood in stools (ED pn).  Pt admitted with Acute colitis with gastritis. Please document in progress notes and discharge summary the cause of the GI bleeding: The medical record reflects the following:    Risk Factors: H&P \"Lower GI bleed POA-blood with mucus after diarrhea described by patient\"    Clinical Indicators:  -WBC 11.5 on admission down to 5.9 on DC  -4/16 CT of the abdomen/pelvis with Marked colitis and Gastritis  -4/18 C Diff AG and toxin test was negative  -4/18 Enteric bacteria panel negative  -4/18 Discharge summary has \"Infectious colitis \" under final dx, and Pt was started on oral Vancomycin per GI for treatment Sx consistent with cdiff for a total of 10d under the Hospital course. Treatment: GI consult, CDiff testing, Flagyl IV x2 days, Rocephin IV x1, Vancomycin PO qid and was to be discharged on Vancomycin PO. Thank you  Options provided:  -- GI bleeding due to colitis  -- GI bleeding due to gastritis  -- GI bleeding due to colitis and gastritis  -- GI bleeding due to, Please document etiology. -- Other - I will add my own diagnosis  -- Disagree - Not applicable / Not valid  -- Disagree - Clinically unable to determine / Unknown  -- Refer to Clinical Documentation Reviewer    PROVIDER RESPONSE TEXT:    This patient has GI bleeding due to colitis.     Query created by: Melanie Benavides on 4/24/2023 12:31 PM      Electronically signed by:  Melven Lennox NP 4/24/2023 8:10 PM

## 2023-04-25 NOTE — PROGRESS NOTES
ICD-10-CM ICD-9-CM    1. Colitis  K52.9 558.9       2. Primary hypertension  I10 401.9 valsartan (DIOVAN) 320 mg tablet      3. Rheumatoid arthritis involving multiple sites, unspecified whether rheumatoid factor present ([facility])  M06.9 714.0       4. Essential hypertension  I10 401.9                Subjective:     Chief Complaint   Patient presents with    Quinn Cruz is a 78 y.o. F.  she  has a past medical history of Former smoker, stopped smoking in distant past, History of abdominal pain, History of colitis (04/2023), History of echocardiogram (06/2012), History of left heart catheterization (06/2012), History of melanoma, Hypercholesterolemia, Hypertension, Long term current use of immunosuppressive drug, Lumbar degenerative disc disease, Rheumatoid arthritis ([facility] [address] Transaminitis, and Vitamin D deficiency. her last appointment in this clinic was 4/4/2023 . I reviewed and updated the medical record. This very pleasant 16-year-old white female with past medical history of rheumatoid arthritis currently on etanercept for the same presents for follow-up after recent hospitalization for presumptive infectious colitis. The patient had been in her usual state of health until recently, when she had previously been treated for urinary tract infection with E. coli with antibiotic therapy. She had tolerated therapy well, and had subsequently undergone a total knee replacement at the direction of orthopedic surgery. However, while she otherwise tolerated the surgery well, she had subsequently developed severe explosive diarrhea, with hematochezia. She was admitted to the hospital secondary to anorexia and inability to maintain oral intake, where CT scan had been performed demonstrating severe colitis.   C. difficile testing had been negative, and other stool studies had been negative, but because of her recent antibiotic use as well as her history of immunosuppression, she was started on empiric therapy with oral vancomycin for presumptive C. difficile. She continues on this currently along with probiotic therapy. Today, the patient reports feeling generally better. Her diarrhea has significantly improved, and while she does have some GI upset with the use of the oral vancomycin and probiotic, she has otherwise had no abdominal pain, fevers, chills, or other constitutional systemic complaints. Hematochezia that she was having before has completely resolved. She denies any melena. She notes that her appetite has been improving. She has yet to restart the etanercept, and says that she will check in with her rheumatologist on this issue sometime this week. She has pending follow-up with gastroenterology sometime next month; she notes that they had considered the possibility of performing lower endoscopy following resolution of her symptoms but this has yet to be scheduled. She otherwise continues on use of medications for her rheumatoid arthritis with hydroxychloroquine and sulfasalazine. She also continues on her use medication for hypertension with Diovan 320 mg, without lightheadedness or dizziness. Her review of systems is otherwise negative. Routine Healthcare Maintenance issues are reviewed and discussed with the patient as noted below. Orders to update gaps in healthcare maintenance were placed as noted below in the Assessment and Plan, where applicable. Past Medical History:  Past Medical History:   Diagnosis Date    Former smoker, stopped smoking in distant past     History of abdominal pain     History of colitis 04/2023    History of echocardiogram 06/2012    Left ventricle: Ejection fraction was estimated to be 65 %. Mitral valve: There was mild regurgitation. Aortic valve: There was no stenosis. Tricuspid valve: There was mild regurgitation    History of left heart catheterization 06/2012    1.  Angiographically normal coronary arteries with normal left ventricular function. 2. Mildly elevated left ventricular filling pressures. No aortic stenosis. Systemic arterial pressures within normal limits. 3. Normal left ventricular function with an estimated ejection fraction of 60% and no significant mitral regurgitation. 4. Angiographically normal coronary arteries in a right dominant system    History of melanoma     Hypercholesterolemia     Hypertension     Long term current use of immunosuppressive drug     Lumbar degenerative disc disease     L3-L4    Rheumatoid arthritis (HCC)     RX = Etanercept, Sulfsalazine, Plaquenil    Transaminitis     Vitamin D deficiency        Past Surgical Histor:  Past Surgical History:   Procedure Laterality Date    COLONOSCOPY N/A 10/1/2021    COLONOSCOPY performed by [facility] MD [place]alba at [address]; HI RISK IND  10/1/2021         HX GYN       X2    HX MALIGNANT SKIN LESION EXCISION      melanoma x2    HX OTHER SURGICAL      lymph node removed from Right arm benign    HX OTHER SURGICAL  2021    Left upper chest subcutaneous nodule excision       Allergies: Allergies   Allergen Reactions    Shellfish Derived Shortness of Breath    Levaquin [Levofloxacin] Rash    Codeine Rash    Pcn [Penicillins] Rash    Seafood [Shellfish Containing Products] Shortness of Breath       Medications:  Current Outpatient Medications   Medication Sig Dispense Refill    valsartan (DIOVAN) 320 mg tablet Take 1 Tablet by mouth daily. 90 Tablet 3    vancomycin (VANCOCIN) 125 mg capsule Take 1 Capsule by mouth four (4) times daily for 9 days. 36 Capsule 0    famotidine (PEPCID) 20 mg tablet Take 1 Tablet by mouth two (2) times a day for 30 days. 60 Tablet 0    amLODIPine (NORVASC) 2.5 mg tablet TAKE ONE TABLET BY MOUTH DAILY 90 Tablet 0    aspirin delayed-release 81 mg tablet Take 1 Tablet by mouth two (2) times a day. 60 Tablet 0    celecoxib (CELEBREX) 200 mg capsule Take 1 Capsule by mouth two (2) times a day. cholecalciferol, vitamin d3, 250 mcg (10,000 unit) cap Take 1,000 Units by mouth daily. sod bicarb-sod chlor-neti pot ([place] Saline Nasal Neti Rinse) pkdv 1 Each by sinus irrigation route three (3) times daily. 50 Each 1    fluticasone propionate (FLONASE) 50 mcg/actuation nasal spray 2 Sprays by Both Nostrils route daily. Indications: chronic stuffy and runny nose not caused by allergies 1 Each 1    etanercept (ENBREL) 50 mg/mL (0.98 mL) injection 0.98 mL by SubCUTAneous route every seven (7) days. Indications: rheumatoid arthritis      sulfaSALAzine (AZULFIDINE) 500 mg tablet Take 1 Tablet by mouth two (2) times a day. Take two tablets twice daily   Indications: rheumatoid arthritis      hydroxychloroquine (PLAQUINIL) 200 mg tablet Take 1 Tablet by mouth two (2) times a day. Take one tablet twice daily      acetaminophen (TYLENOL) 500 mg tablet Take 2 Tablets by mouth every six (6) hours as needed.            Social History:  Social History     Socioeconomic History    Marital status:     Number of children: 2   Tobacco Use    Smoking status: Former     Packs/day: 1.00     Years: 1.00     Pack years: 1.00     Types: Cigarettes, Cigars    Smokeless tobacco: Former     Quit date: 1974    Tobacco comments:     quit 1974   Vaping Use    Vaping Use: Never used   Substance and Sexual Activity    Alcohol use: No    Drug use: Not Currently       Family History:  Family History   Problem Relation Age of Onset    Breast Cancer Mother     Heart Disease Father     Dementia Father        Immunizations:  Immunization History   Administered Date(s) Administered    COVID-19, PFIZER PURPLE top, DILUTE for use, (age 15 y+), IM, 30mcg/0.3mL 03/15/2021, 04/05/2021, 08/23/2021    Influenza High Dose Vaccine PF 10/01/2021, 10/01/2022    Influenza Vaccine 10/20/2008, 10/19/2009, 10/31/2013, 11/07/2016, 10/01/2019    Influenza, AFLURIA (age 10-32 mo), IM, MDV, 0.25 mL, Fluzone (age 10 mo+), AFLURIA (age 1 y+), IM, MDV, 0.5mL 10/18/2017    Influenza, FLUARIX, FLULAVAL, FLUZONE (age 10 mo+) AND AFLURIA, (age 1 y+), PF, 0.5mL 10/05/2015    Influenza, FLUBLOK, (age 25 y+), PF 10/18/2018    Novel Influenza-H1N1-09, All Formulations 12/03/2009    Pneumococcal Conjugate (PCV-13) 10/18/2018    Pneumococcal Conjugate PCV20, PF (Prevnar 20) 05/27/2022    Pneumococcal Polysaccharide (PPSV-23) 01/01/2011    Tdap 08/14/2018        Healthcare Maintenance:  Health Maintenance   Topic Date Due    Shingles Vaccine (1 of 2) Never done    COVID-19 Vaccine (5 - Booster for Pfizer series) 09/12/2022    Breast Cancer Screen Mammogram  05/23/2023    Medicare Yearly Exam  05/28/2023    Depression Screen  04/04/2024    Colorectal Cancer Screening Combo  10/01/2026    Lipid Screen  05/27/2027    DTaP/Tdap/Td series (2 - Td or Tdap) 08/14/2028    Hepatitis C Screening  Completed    Bone Densitometry (Dexa) Screening  Completed    Flu Vaccine  Completed    Pneumococcal 65+ years  Completed    Pneumococcal 0-64 years  Discontinued        Review of Systems:  ROS:  Review of Systems   Constitutional: Negative. HENT: Negative. Eyes: Negative. Respiratory: Negative. Cardiovascular: Negative. Gastrointestinal:  Positive for diarrhea. Genitourinary: Negative. Musculoskeletal: Negative. Skin: Negative. Neurological: Negative. Endo/Heme/Allergies: Negative. Psychiatric/Behavioral: Negative. ROS otherwise negative      Objective:     Vital Signs:  Visit Vitals  /78 (BP 1 Location: Left upper arm, BP Patient Position: Sitting, BP Cuff Size: Adult)   Pulse 74   Temp 98.2 °F (36.8 °C) (Oral)   Resp 18   Ht 5' 9\" (1.753 m)   Wt 172 lb (78 kg)   SpO2 98%   BMI 25.40 kg/m²       BMI:  Body mass index is 25.4 kg/m². Physical Examination:  Physical Exam  Constitutional:       Appearance: Normal appearance. She is normal weight. HENT:      Head: Normocephalic and atraumatic.       Right Ear: External ear normal.      Left Ear: External ear normal.      Nose: Nose normal.      Mouth/Throat:      Mouth: Mucous membranes are moist.      Pharynx: Oropharynx is clear. No oropharyngeal exudate or posterior oropharyngeal erythema. Cardiovascular:      Rate and Rhythm: Normal rate and regular rhythm. Pulses: Normal pulses. Heart sounds: Normal heart sounds. No murmur heard. No friction rub. No gallop. Pulmonary:      Effort: Pulmonary effort is normal. No respiratory distress. Breath sounds: Normal breath sounds. No wheezing, rhonchi or rales. Abdominal:      General: Abdomen is flat. Bowel sounds are normal. There is no distension. Palpations: Abdomen is soft. Tenderness: There is no abdominal tenderness. There is no guarding. Musculoskeletal:         General: No swelling, tenderness or deformity. Normal range of motion. Cervical back: Normal range of motion and neck supple. No rigidity or tenderness. Skin:     General: Skin is warm and dry. Findings: No erythema, lesion or rash. Neurological:      General: No focal deficit present. Mental Status: She is alert and oriented to person, place, and time. Mental status is at baseline. Sensory: No sensory deficit. Motor: No weakness.       Gait: Gait normal.      Deep Tendon Reflexes: Reflexes normal.   Psychiatric:         Mood and Affect: Mood normal.         Behavior: Behavior normal.         Judgment: Judgment normal.        Physical exam otherwise negative    Diagnostic Testing:    Laboratory Studies:  Admission on 04/16/2023, Discharged on 04/18/2023   Component Date Value Ref Range Status    Lactic Acid (POC) 04/16/2023 0.97  0.40 - 2.00 mmol/L Final    WBC 04/16/2023 11.5 (H)  3.6 - 11.0 K/uL Final    RBC 04/16/2023 3.46 (L)  3.80 - 5.20 M/uL Final    HGB 04/16/2023 10.5 (L)  11.5 - 16.0 g/dL Final    HCT 04/16/2023 31.7 (L)  35.0 - 47.0 % Final    MCV 04/16/2023 91.6  80.0 - 99.0 FL Final    MCH 04/16/2023 30.3  26.0 - 34.0 PG Final MCHC 04/16/2023 33.1  30.0 - 36.5 g/dL Final    RDW 04/16/2023 12.4  11.5 - 14.5 % Final    PLATELET 30/10/4359 814  150 - 400 K/uL Final    MPV 04/16/2023 8.4 (L)  8.9 - 12.9 FL Final    NRBC 04/16/2023 0.0  0  WBC Final    ABSOLUTE NRBC 04/16/2023 0.00  0.00 - 0.01 K/uL Final    NEUTROPHILS 04/16/2023 83 (H)  32 - 75 % Final    LYMPHOCYTES 04/16/2023 10 (L)  12 - 49 % Final    MONOCYTES 04/16/2023 7  5 - 13 % Final    EOSINOPHILS 04/16/2023 0  0 - 7 % Final    BASOPHILS 04/16/2023 0  0 - 1 % Final    IMMATURE GRANULOCYTES 04/16/2023 0  0.0 - 0.5 % Final    ABS. NEUTROPHILS 04/16/2023 9.5 (H)  1.8 - 8.0 K/UL Final    ABS. LYMPHOCYTES 04/16/2023 1.2  0.8 - 3.5 K/UL Final    ABS. MONOCYTES 04/16/2023 0.8  0.0 - 1.0 K/UL Final    ABS. EOSINOPHILS 04/16/2023 0.0  0.0 - 0.4 K/UL Final    ABS. BASOPHILS 04/16/2023 0.0  0.0 - 0.1 K/UL Final    ABS. IMM. GRANS. 04/16/2023 0.0  0.00 - 0.04 K/UL Final    DF 04/16/2023 AUTOMATED    Final    Sodium 04/16/2023 128 (L)  136 - 145 mmol/L Final    Potassium 04/16/2023 4.0  3.5 - 5.1 mmol/L Final    Chloride 04/16/2023 98  97 - 108 mmol/L Final    CO2 04/16/2023 25  21 - 32 mmol/L Final    Anion gap 04/16/2023 5  5 - 15 mmol/L Final    Glucose 04/16/2023 95  65 - 100 mg/dL Final    BUN 04/16/2023 11  6 - 20 MG/DL Final    Creatinine 04/16/2023 0.46 (L)  0.55 - 1.02 MG/DL Final    BUN/Creatinine ratio 04/16/2023 24 (H)  12 - 20   Final    eGFR 04/16/2023 >60  >60 ml/min/1.73m2 Final    Comment:      Pediatric calculator link: [email or web address]       These results are not intended for use in patients <25years of age. eGFR results are calculated without a race factor using  the 2021 CKD-EPI equation. Careful clinical correlation is recommended, particularly when comparing to results calculated using previous equations.   The CKD-EPI equation is less accurate in patients with extremes of muscle mass, extra-renal metabolism of creatinine, excessive creatine ingestion, or following therapy that affects renal tubular secretion. Calcium 04/16/2023 9.6  8.5 - 10.1 MG/DL Final    SAMPLES BEING HELD 04/16/2023 PST   Final    COMMENT 04/16/2023 Add-on orders for these samples will be processed based on acceptable specimen integrity and analyte stability, which may vary by analyte. Final    Color 04/16/2023 YELLOW/STRAW    Final    Color Reference Range: Straw, Yellow or Dark Yellow    Appearance 04/16/2023 CLEAR  CLEAR   Final    Specific gravity 04/16/2023 1.010    Final    pH (UA) 04/16/2023 >8.5 (H)  5.0 - 8.0 Final    Protein 04/16/2023 Negative  NEG mg/dL Final    Glucose 04/16/2023 Negative  NEG mg/dL Final    Ketone 04/16/2023 40 (A)  NEG mg/dL Final    Bilirubin 04/16/2023 Negative  NEG   Final    Blood 04/16/2023 Negative  NEG   Final    Urobilinogen 04/16/2023 1.0  0.2 - 1.0 EU/dL Final    Nitrites 04/16/2023 Negative  NEG   Final    Leukocyte Esterase 04/16/2023 Negative  NEG   Final    UA:UC IF INDICATED 04/16/2023 CULTURE NOT INDICATED BY UA RESULT  CNI   Final    WBC 04/16/2023 0-4  0 - 4 /hpf Final    RBC 04/16/2023 0-5  0 - 5 /hpf Final    Epithelial cells 04/16/2023 MODERATE (A)  FEW /lpf Final    Epithelial cell category consists of squamous cells and /or transitional urothelial cells. Renal tubular cells, if present, are separately identified as such. Bacteria 04/16/2023 Negative  NEG /hpf Final    Hyaline cast 04/16/2023 0-2  0 - 2 /lpf Final    Specimen source 04/16/2023 Nasopharyngeal    Final    COVID-19 rapid test 04/16/2023 Not detected  NOTD   Final    Comment: Rapid Abbott ID Now       Rapid NAAT:  The specimen is NEGATIVE for SARS-CoV-2, the novel coronavirus associated with COVID-19. Negative results should be treated as presumptive and, if inconsistent with clinical signs and symptoms or necessary for patient management, should be tested with an alternative molecular assay.   Negative results do not preclude SARS-CoV-2 infection and should not be used as the sole basis for patient management decisions. This test has been authorized by the FDA under an Emergency Use Authorization (EUA) for use by authorized laboratories. Fact sheet for Healthcare Providers:  http://www.esdras.chelsi/  Fact sheet for Patients: http://www.esdras.chelsi/       Methodology: Isothermal Nucleic Acid Amplification      Influenza A Antigen 04/16/2023 Negative  NEG   Final    Influenza B Antigen 04/16/2023 Negative  NEG   Final    Protein, total 04/16/2023 7.3  6.4 - 8.2 g/dL Final    Albumin 04/16/2023 3.4 (L)  3.5 - 5.0 g/dL Final    Globulin 04/16/2023 3.9  2.0 - 4.0 g/dL Final    A-G Ratio 04/16/2023 0.9 (L)  1.1 - 2.2   Final    Bilirubin, total 04/16/2023 0.7  0.2 - 1.0 MG/DL Final    Bilirubin, direct 04/16/2023 0.2  0.0 - 0.2 MG/DL Final    Alk. phosphatase 04/16/2023 71  45 - 117 U/L Final    AST (SGOT) 04/16/2023 16  15 - 37 U/L Final    ALT (SGPT) 04/16/2023 25  12 - 78 U/L Final    Lipase 04/16/2023 81  73 - 393 U/L Final    Sodium 04/17/2023 129 (L)  136 - 145 mmol/L Final    Potassium 04/17/2023 3.9  3.5 - 5.1 mmol/L Final    Chloride 04/17/2023 103  97 - 108 mmol/L Final    CO2 04/17/2023 23  21 - 32 mmol/L Final    Anion gap 04/17/2023 3 (L)  5 - 15 mmol/L Final    Glucose 04/17/2023 94  65 - 100 mg/dL Final    BUN 04/17/2023 9  6 - 20 MG/DL Final    Creatinine 04/17/2023 0.40 (L)  0.55 - 1.02 MG/DL Final    BUN/Creatinine ratio 04/17/2023 23 (H)  12 - 20   Final    eGFR 04/17/2023 >60  >60 ml/min/1.73m2 Final    Comment:      Pediatric calculator link: [email or web address]       These results are not intended for use in patients <25years of age. eGFR results are calculated without a race factor using  the 2021 CKD-EPI equation.  Careful clinical correlation is recommended, particularly when comparing to results calculated using previous equations. The CKD-EPI equation is less accurate in patients with extremes of muscle mass, extra-renal metabolism of creatinine, excessive creatine ingestion, or following therapy that affects renal tubular secretion. Calcium 04/17/2023 8.6  8.5 - 10.1 MG/DL Final    WBC 04/17/2023 8.9  3.6 - 11.0 K/uL Final    RBC 04/17/2023 3.02 (L)  3.80 - 5.20 M/uL Final    HGB 04/17/2023 9.2 (L)  11.5 - 16.0 g/dL Final    HCT 04/17/2023 28.1 (L)  35.0 - 47.0 % Final    MCV 04/17/2023 93.0  80.0 - 99.0 FL Final    MCH 04/17/2023 30.5  26.0 - 34.0 PG Final    MCHC 04/17/2023 32.7  30.0 - 36.5 g/dL Final    RDW 04/17/2023 12.5  11.5 - 14.5 % Final    PLATELET 72/02/2932 709  150 - 400 K/uL Final    MPV 04/17/2023 8.4 (L)  8.9 - 12.9 FL Final    NRBC 04/17/2023 0.0  0  WBC Final    ABSOLUTE NRBC 04/17/2023 0.00  0.00 - 0.01 K/uL Final    WBC 04/18/2023 5.9  3.6 - 11.0 K/uL Final    RBC 04/18/2023 3.39 (L)  3.80 - 5.20 M/uL Final    HGB 04/18/2023 10.0 (L)  11.5 - 16.0 g/dL Final    HCT 04/18/2023 31.9 (L)  35.0 - 47.0 % Final    MCV 04/18/2023 94.1  80.0 - 99.0 FL Final    MCH 04/18/2023 29.5  26.0 - 34.0 PG Final    MCHC 04/18/2023 31.3  30.0 - 36.5 g/dL Final    RDW 04/18/2023 12.3  11.5 - 14.5 % Final    PLATELET 44/81/1524 247  150 - 400 K/uL Final    MPV 04/18/2023 8.0 (L)  8.9 - 12.9 FL Final    NRBC 04/18/2023 0.0  0  WBC Final    ABSOLUTE NRBC 04/18/2023 0.00  0.00 - 0.01 K/uL Final    NEUTROPHILS 04/18/2023 63  32 - 75 % Final    LYMPHOCYTES 04/18/2023 17  12 - 49 % Final    MONOCYTES 04/18/2023 13  5 - 13 % Final    EOSINOPHILS 04/18/2023 5  0 - 7 % Final    BASOPHILS 04/18/2023 1  0 - 1 % Final    IMMATURE GRANULOCYTES 04/18/2023 1 (H)  0.0 - 0.5 % Final    ABS. NEUTROPHILS 04/18/2023 3.8  1.8 - 8.0 K/UL Final    ABS. LYMPHOCYTES 04/18/2023 1.0  0.8 - 3.5 K/UL Final    ABS. MONOCYTES 04/18/2023 0.7  0.0 - 1.0 K/UL Final    ABS. EOSINOPHILS 04/18/2023 0.3  0.0 - 0.4 K/UL Final    ABS.  BASOPHILS 04/18/2023 0.0  0.0 - 0.1 K/UL Final    ABS. IMM. GRANS. 04/18/2023 0.0  0.00 - 0.04 K/UL Final    DF 04/18/2023 AUTOMATED    Final    Sodium 04/18/2023 133 (L)  136 - 145 mmol/L Final    Potassium 04/18/2023 3.6  3.5 - 5.1 mmol/L Final    Chloride 04/18/2023 103  97 - 108 mmol/L Final    CO2 04/18/2023 25  21 - 32 mmol/L Final    Anion gap 04/18/2023 5  5 - 15 mmol/L Final    Glucose 04/18/2023 89  65 - 100 mg/dL Final    BUN 04/18/2023 6  6 - 20 MG/DL Final    Creatinine 04/18/2023 0.44 (L)  0.55 - 1.02 MG/DL Final    BUN/Creatinine ratio 04/18/2023 14  12 - 20   Final    eGFR 04/18/2023 >60  >60 ml/min/1.73m2 Final    Comment:      Pediatric calculator link: [email or web address]       These results are not intended for use in patients <25years of age. eGFR results are calculated without a race factor using  the 2021 CKD-EPI equation. Careful clinical correlation is recommended, particularly when comparing to results calculated using previous equations. The CKD-EPI equation is less accurate in patients with extremes of muscle mass, extra-renal metabolism of creatinine, excessive creatine ingestion, or following therapy that affects renal tubular secretion.       Calcium 04/18/2023 9.4  8.5 - 10.1 MG/DL Final    Magnesium 04/18/2023 2.1  1.6 - 2.4 mg/dL Final    Phosphorus 04/18/2023 2.1 (L)  2.6 - 4.7 MG/DL Final    GDH ANTIGEN 04/18/2023 Negative  NEG   Final    C. difficile toxin 04/18/2023 Negative  NEG   Final    INTERPRETATION 04/18/2023 NEGATIVE FOR TOXIGENIC C. DIFFICILE  NTXCD   Final    Shigella species, DNA 04/18/2023 Negative  NEG   Final    Campylobacter species, DNA 04/18/2023 Negative  NEG   Final    Vibrio species, DNA 04/18/2023 Negative  NEG   Final    Enterotoxigen E Coli, DNA 04/18/2023 Negative  NEG   Final    Shiga toxin producing, DNA 04/18/2023 Negative  NEG   Final    Salmonella species, DNA 04/18/2023 Negative  NEG   Final    P. shigelloides, DNA 04/18/2023 Negative  NEG   Final    Y. enterocolitica, DNA 04/18/2023 Negative  NEG   Final   Office Visit on 04/04/2023   Component Date Value Ref Range Status    Special Requests: 04/04/2023 NO SPECIAL REQUESTS    Final    [place] Count 04/04/2023     Final                    Value:>100,000  COLONIES/mL      Culture result: 04/04/2023 Escherichia coli (A)    Final    Color 04/04/2023 YELLOW/STRAW    Final    Color Reference Range: Straw, Yellow or Dark Yellow    Appearance 04/04/2023 CLOUDY (A)  CLEAR   Final    Specific gravity 04/04/2023 1.020  1.003 - 1.030   Final    pH (UA) 04/04/2023 6.0  5.0 - 8.0   Final    Protein 04/04/2023 Negative  Negative mg/dL Final    Glucose 04/04/2023 Negative  Negative mg/dL Final    Ketone 04/04/2023 Negative  Negative mg/dL Final    Bilirubin 04/04/2023 Negative  Negative   Final    Blood 04/04/2023 Negative  Negative   Final    Urobilinogen 04/04/2023 0.2  0.2 - 1.0 EU/dL Final    Nitrites 04/04/2023 Positive (A)  Negative   Final    Leukocyte Esterase 04/04/2023 SMALL (A)  Negative   Final    Sodium 04/04/2023 130 (L)  136 - 145 mmol/L Final    Potassium 04/04/2023 3.8  3.5 - 5.1 mmol/L Final    Chloride 04/04/2023 94 (L)  97 - 108 mmol/L Final    CO2 04/04/2023 32  21 - 32 mmol/L Final    Anion gap 04/04/2023 4 (L)  5 - 15 mmol/L Final    Glucose 04/04/2023 89  65 - 100 mg/dL Final    BUN 04/04/2023 17  6 - 20 MG/DL Final    Creatinine 04/04/2023 0.57  0.55 - 1.02 MG/DL Final    BUN/Creatinine ratio 04/04/2023 30 (H)  12 - 20   Final    eGFR 04/04/2023 >60  >60 ml/min/1.73m2 Final    Comment:   Pediatric calculator link: [email or web address]    These results are not intended for use in patients <25years of age. eGFR results are calculated without a race factor using  the 2021 CKD-EPI equation. Careful clinical correlation is recommended, particularly when comparing to results calculated using previous equations.   The CKD-EPI equation is less accurate in patients with extremes of muscle mass, extra-renal metabolism of creatinine, excessive creatine ingestion, or following therapy that affects renal tubular secretion. Calcium 04/04/2023 10.2 (H)  8.5 - 10.1 MG/DL Final   Office Visit on 03/30/2023   Component Date Value Ref Range Status    Sed rate, automated 03/30/2023 6  0 - 30 mm/hr Final    Sodium 03/30/2023 129 (L)  136 - 145 mmol/L Final    Potassium 03/30/2023 4.4  3.5 - 5.1 mmol/L Final    Chloride 03/30/2023 94 (L)  97 - 108 mmol/L Final    CO2 03/30/2023 30  21 - 32 mmol/L Final    Anion gap 03/30/2023 5  5 - 15 mmol/L Final    Glucose 03/30/2023 112 (H)  65 - 100 mg/dL Final    BUN 03/30/2023 19  6 - 20 MG/DL Final    Creatinine 03/30/2023 0.52 (L)  0.55 - 1.02 MG/DL Final    BUN/Creatinine ratio 03/30/2023 37 (H)  12 - 20   Final    eGFR 03/30/2023 >60  >60 ml/min/1.73m2 Final    Comment:   Pediatric calculator link: [employer].at. org/professionals/kdoqi/gfr_calculatorped    These results are not intended for use in patients <25years of age. eGFR results are calculated without a race factor using  the 2021 CKD-EPI equation. Careful clinical correlation is recommended, particularly when comparing to results calculated using previous equations. The CKD-EPI equation is less accurate in patients with extremes of muscle mass, extra-renal metabolism of creatinine, excessive creatine ingestion, or following therapy that affects renal tubular secretion. Calcium 03/30/2023 10.6 (H)  8.5 - 10.1 MG/DL Final    Bilirubin, total 03/30/2023 0.3  0.2 - 1.0 MG/DL Final    ALT (SGPT) 03/30/2023 28  12 - 78 U/L Final    AST (SGOT) 03/30/2023 23  15 - 37 U/L Final    Alk.  phosphatase 03/30/2023 74  45 - 117 U/L Final    Protein, total 03/30/2023 7.9  6.4 - 8.2 g/dL Final    Albumin 03/30/2023 4.4  3.5 - 5.0 g/dL Final    Globulin 03/30/2023 3.5  2.0 - 4.0 g/dL Final    A-G Ratio 03/30/2023 1.3  1.1 - 2.2   Final    WBC 03/30/2023 6.0  3.6 - 11.0 K/uL Final    RBC 03/30/2023 4.54  3.80 - 5.20 M/uL Final    HGB 03/30/2023 13.4  11.5 - 16.0 g/dL Final    HCT 03/30/2023 43.0  35.0 - 47.0 % Final    MCV 03/30/2023 94.7  80.0 - 99.0 FL Final    MCH 03/30/2023 29.5  26.0 - 34.0 PG Final    MCHC 03/30/2023 31.2  30.0 - 36.5 g/dL Final    RDW 03/30/2023 11.9  11.5 - 14.5 % Final    PLATELET 88/26/9240 564  150 - 400 K/uL Final    MPV 03/30/2023 9.3  8.9 - 12.9 FL Final    NRBC 03/30/2023 0.0  0  WBC Final    ABSOLUTE NRBC 03/30/2023 0.00  0.00 - 0.01 K/uL Final    NEUTROPHILS 03/30/2023 77 (H)  32 - 75 % Final    LYMPHOCYTES 03/30/2023 11 (L)  12 - 49 % Final    MONOCYTES 03/30/2023 11  5 - 13 % Final    EOSINOPHILS 03/30/2023 0  0 - 7 % Final    BASOPHILS 03/30/2023 1  0 - 1 % Final    IMMATURE GRANULOCYTES 03/30/2023 0  0.0 - 0.5 % Final    ABS. NEUTROPHILS 03/30/2023 4.5  1.8 - 8.0 K/UL Final    ABS. LYMPHOCYTES 03/30/2023 0.7 (L)  0.8 - 3.5 K/UL Final    ABS. MONOCYTES 03/30/2023 0.7  0.0 - 1.0 K/UL Final    ABS. EOSINOPHILS 03/30/2023 0.0  0.0 - 0.4 K/UL Final    ABS. BASOPHILS 03/30/2023 0.1  0.0 - 0.1 K/UL Final    ABS. IMM.  GRANS. 03/30/2023 0.0  0.00 - 0.04 K/UL Final    DF 03/30/2023 SMEAR SCANNED    Final    RBC COMMENTS 03/30/2023 NORMOCYTIC, NORMOCHROMIC    Final   Office Visit on 11/29/2022   Component Date Value Ref Range Status    Sodium 11/29/2022 134 (L)  136 - 145 mmol/L Final    Potassium 11/29/2022 4.7  3.5 - 5.1 mmol/L Final    Chloride 11/29/2022 101  97 - 108 mmol/L Final    CO2 11/29/2022 29  21 - 32 mmol/L Final    Anion gap 11/29/2022 4 (L)  5 - 15 mmol/L Final    Glucose 11/29/2022 79  65 - 100 mg/dL Final    BUN 11/29/2022 19  6 - 20 MG/DL Final    Creatinine 11/29/2022 0.57  0.55 - 1.02 MG/DL Final    BUN/Creatinine ratio 11/29/2022 33 (H)  12 - 20   Final    eGFR 11/29/2022 >60  >60 ml/min/1.73m2 Final    Comment:   Pediatric calculator link: Matthew.at. org/professionals/kdoqi/gfr_calculatorped    Effective Oct 3, 2022    These results are not intended for use in patients <25years of age. eGFR results are calculated without a race factor using  the 2021 CKD-EPI equation. Careful clinical correlation is recommended, particularly when comparing to results calculated using previous equations. The CKD-EPI equation is less accurate in patients with extremes of muscle mass, extra-renal metabolism of creatinine, excessive creatine ingestion, or following therapy that affects renal tubular secretion. Calcium 11/29/2022 9.9  8.5 - 10.1 MG/DL Final    Bilirubin, total 11/29/2022 0.3  0.2 - 1.0 MG/DL Final    ALT (SGPT) 11/29/2022 34  12 - 78 U/L Final    AST (SGOT) 11/29/2022 23  15 - 37 U/L Final    Alk. phosphatase 11/29/2022 103  45 - 117 U/L Final    Protein, total 11/29/2022 7.8  6.4 - 8.2 g/dL Final    Albumin 11/29/2022 4.2  3.5 - 5.0 g/dL Final    Globulin 11/29/2022 3.6  2.0 - 4.0 g/dL Final    A-G Ratio 11/29/2022 1.2  1.1 - 2.2   Final    Vitamin D 25-Hydroxy 11/29/2022 17.3 (L)  30 - 100 ng/mL Final    Comment: (NOTE)  Deficiency               <20 ng/mL  Insufficiency          20-30 ng/mL  Sufficient             ng/mL  Possible toxicity       >100 ng/mL    The Method used is Siemens Advia Centaur currently standardized to a   Center of Disease Control and Prevention (CDC) certified reference   [address]. Samples containing fluorescein dye can produce falsely   elevated values when tested with the ADVIA Centaur Vitamin D Assay. It is recommended that results in the toxic range, >100 ng/mL, be   retested 72 hours post fluorescein exposure. SAMPLES BEING HELD 11/29/2022 1LAV   Final    COMMENT 11/29/2022 Add-on orders for these samples will be processed based on acceptable specimen integrity and analyte stability, which may vary by analyte.     Final         Radiographic Studies:  XR Results (most recent):  [facility] on [facility]16[facility]    XR CHEST PORT    Narrative  EXAM: XR CHEST PORT    DATE: 4/16/2023 1:50 PM    INDICATION: weakness    COMPARISON: Chest March 23, 2017    FINDINGS: AP portable chest radiograph. The lungs are adequately expanded. The  heart size is within normal limits. There is no focal airspace consolidation. The vascular clarity is within normal limits. There is no evidence of pleural  effusion or pneumothorax. No displaced fracture is seen. Impression  No acute cardiopulmonary findings. ZACHARY Results (most recent):  Results from Abstract encounter on 07/06/22    ZACHARY 3D SHAMEKA W MAMMO BI SCREENING INCL CAD     CT Results (most recent):  Results from Hospital Encounter encounter on 04/16/23    CT ABD PELV W CONT    Narrative  EXAM: CT ABD PELV W CONT    INDICATION: Abdominal pain, n/v/d, bloody stools    COMPARISON: 6/19/2012    CONTRAST: 100 mL of Isovue-370. ORAL CONTRAST: 0    TECHNIQUE:  Following the uneventful intravenous administration of contrast, thin axial  images were obtained through the abdomen and pelvis. Coronal and sagittal  reconstructions were generated. CT dose reduction was achieved through use of a  standardized protocol tailored for this examination and automatic exposure  control for dose modulation. FINDINGS:  LOWER THORAX: No significant abnormality in the incidentally imaged lower chest.  LIVER: No mass. 2 markedly conspicuous hypodensities in the liver. Joya Karey BILIARY TREE: Gallbladder is within normal limits. CBD is not dilated. SPLEEN: within normal limits. PANCREAS: No mass or ductal dilatation. ADRENALS: Unremarkable. KIDNEYS: No mass or hydronephrosis. 2 mm round calcification in the left lower  renal pole (image 38). STOMACH: Diffuse thickening of the distal stomach. SMALL BOWEL: No dilatation or wall thickening. COLON: Diffuse thickening of the descending colon as well as the left transverse  colon and sigmoid colon.   APPENDIX: Normal on axial image 64  PERITONEUM: No ascites or pneumoperitoneum. RETROPERITONEUM: No lymphadenopathy or aortic aneurysm. REPRODUCTIVE ORGANS: Normal uterus  URINARY BLADDER: No mass or calculus. BONES: No destructive bone lesion. There is a C-shaped curvature of the lumbar  spine with disk dessication at multiple levels  ABDOMINAL WALL: No mass or hernia. ADDITIONAL COMMENTS: N/A    Impression  Marked colitis  Gastritis  Left lower renal pole nonobstructing calculus     DEXA Results (most recent):  Results from Hospital Encounter encounter on 01/19/23    DEXA BONE DENSITY STUDY AXIAL    Narrative  Bone Mineral Density    Indication: Postmenopausal  Age: 78  Sex: Female. Menopause status: Postmenopausal.  Hormone replacement therapy: No    Number of falls in the past year: 1  Risk factors for osteoporosis: History of rheumatoid arthritis    Current medication for osteoporosis: None. Comparison: None. Technique: Imaging was performed on the Therma-Wave. World Health Organization  meta-analysis fracture risk calculator (FRAX) analysis was performed for 10 year  fracture risk probability assessment    Excluded sites: L3 and L4 due to spondylosis    Findings:      Femoral Neck: Right  Bone mineral density (gm/cm2): 0.987  % of peak bone mass: 95  % for age matched controls: 114  T-score: -0.4  Z-score: 0.9    Total Hip: Right  Bone mineral density (gm/cm2): 1.032  % of peak bone mass: 102  % for age matched controls: 116  T-score: 0.2  Z-score: 1.1    Lumbar Spine: L1-L2  Bone mineral density (gm/cm2): 1.248  % of peak bone mass: 106  % for age matched controls: 120  T-score: 0.6  Z-score: 1.7    33% Radius:  Left  Bone mineral density (gm/cm2):  0.751  % of peak bone mass:  85  % for age matched controls:  99  T-score:  -1.5  Z-score:  -0.1    Impression  Impression:     This patient is osteopenic using the World Health Organization criteria  10 year probability of major osteoporotic fracture: 7.3%  10 year probability of hip fracture: 0.3%    Recommendations:  Therapy recommendations need to be tailored to each individual patient. Using  the [place] [facility]) FRAX absolute fracture algorithm, the  [facility] recommends beginning pharmacological therapy in  postmenopausal women and men over the age of 48 with a 8 year probability of a  hip fracture of >3% OR with the 10 year probability of a major osteoporotic  fracture of >20%. Please reconsider testing based on risk factors. Currently, Medicare will only  reimburse for a central DXA examination every two years, unless the patient is  on chronic glucocorticoid therapy. Note: Please note that reliable, valid comparisons cannot be made between  studies which have been performed on machines from different manufacturers. If  clinically warranted, a follow up study performed at this site, on the same  unit, would allow the most sensitive assessment of change in bone mineral  density. MRI Results (most recent):  Results from [place] encounter on 08/10/22    MRI LUMB SPINE WO CONT    Narrative  INDICATION:  Low back pain    EXAMINATION:  MRI LUMBAR SPINE without CONTRAST    COMPARISON: None    TECHNIQUE: MR imaging of the lumbar spine was performed with sagittal T1, T2,  STIR;  axial T1, T2.  NO CONTRAST ADMINISTERED    FINDINGS:    Mild dextroscoliosis of the lumbar spine. No other significant malalignment. Moderate to severe degenerative disc disease at L3-4 and L4-5. No evidence for  acute fracture. No abnormality of the distal spinal cord. T12-L1: No central spinal canal or neural foraminal stenosis. Mild facet  arthropathy    L1/2: Small disc osteophyte complex without any central spinal canal or neural  foraminal stenosis. Mild facet arthropathy    L2/3: Disc osteophyte complex with minimal central stenosis. Narrowing of the  left subarticular zone without any neural foraminal stenosis.  Mild to moderate  facet arthropathy    L3/4: Disc osteophyte complex, facet arthropathy, and ligamentum flavum  hypertrophy causing mild central stenosis. Narrowing of the left subarticular  zone with mild left and no right neural foraminal stenosis    L4/5: Disc osteophyte complex, facet arthropathy, and ligamentum flavum  hypertrophy causing mild to moderate central stenosis. Right subarticular  stenosis with narrowing of the left subarticular zone and mild to moderate right  and mild left neural foraminal stenosis    L5/S1: Small disc bulge causing no central spinal canal or neural foraminal  stenosis. Mild to moderate facet arthropathy. Small T2 hyperintense liver lesion is probably a cyst.    Impression  1. Mild scoliosis of the lumbar spine with mild to moderate L4-5 central  stenosis. Moderate to severe L3-4 and L4-5 degenerative disc disease. 2. Multilevel neural foraminal narrowing as described above. Assessment/Plan:       ICD-10-CM ICD-9-CM    1. Colitis  K52.9 558.9       2. Primary hypertension  I10 401.9 valsartan (DIOVAN) 320 mg tablet      3. Rheumatoid arthritis involving multiple sites, unspecified whether rheumatoid factor present ([facility]  M06.9 714.0       4. Essential hypertension  I10 401.9              This patient presents for hospital follow-up after recent episode of colitis. She has a history of rheumatoid arthritis and currently is on hydroxychloroquine and sulfasalazine for this, but is not using etanercept, pending clearance by rheumatology. The differential for her colitis is broad, to include infectious colitis, although C. difficile testing was negative. Consider possibility given her use of etanercept of other infectious causes including Cryptosporidium. Need to consider also possibility of giardiasis. It is possible that the patient's resolution of symptoms may have had nothing to do with the oral vancomycin.   Infectious colitis remains the most likely, particularly given her recent antibiotic use, but need to consider other potential causes of diarrhea to include lymphocytic colitis, particular given history of NSAID use. I would strongly lean towards lower endoscopy following resolution of her current symptoms. In the meantime, I agree with holding the etanercept for now, and defer to rheumatology as to when to restart this. She will otherwise continue with her other medications including her Diovan at current dosing without changes. Follow-up with me next month. I have reviewed the patient's medical history in detail and updated the computerized patient record. We had a prolonged discussion about these complex clinical issues and went over the various important aspects to consider. All questions were answered. Advised the patient to call back or return to office if symptoms do not improve, change in nature, or persist.     The patient was given an after visit summary or informed of [employer] Access which includes patient instructions, diagnoses, current medications, & vitals. she expressed understanding with the diagnosis and plan. Vikas Babcock MD    Please note that this dictation was completed with [employer], the computer voice recognition software. Quite often unanticipated grammatical, syntax, homophones, and other interpretive errors are inadvertently transcribed by the computer software. Please disregard these errors. Please excuse any errors that have escaped final proofreading.

## 2023-04-25 NOTE — PROGRESS NOTES
Chief Complaint   Patient presents with    Follow-up   Visit Vitals  /78 (BP 1 Location: Left upper arm, BP Patient Position: Sitting, BP Cuff Size: Adult)   Pulse 74   Temp 98.2 °F (36.8 °C) (Oral)   Resp 18   Ht 5' 9\" (1.753 m)   Wt 172 lb (78 kg)   SpO2 98%   BMI 25.40 kg/m²         1. \"Have you been to the ER, urgent care clinic since your last visit? Hospitalized since your last visit? \"  4/16/23-4/18/23 for infectious colitis at [facility]    2. \"Have you seen or consulted any other health care providers outside of the [address] [place] [address] since your last visit? \" No     3. For patients aged 39-70: Has the patient had a colonoscopy / FIT/ Cologuard? No      If the patient is female:    4. For patients aged 41-77: Has the patient had a mammogram within the past 2 years? No      5. For patients aged 21-65: Has the patient had a pap smear?  No

## 2023-04-26 ENCOUNTER — PATIENT OUTREACH (OUTPATIENT)
Dept: CASE MANAGEMENT | Age: 68
End: 2023-04-26

## 2023-04-26 NOTE — PROGRESS NOTES
Care Transitions Follow Up Call    Patient Current Location: [facility] Transition Nurse (CTN) contacted the patient by telephone to follow up after admission on 4/16/2023. Addressed changes since last contact: none  Follow up appointment completed? yes. Was follow up appointment scheduled within 7 days of discharge? no.     CTN reviewed medical action plan and red flags with patient and discussed any barriers to care and/or understanding of plan of care after discharge. Discussed appropriate site of care based on symptoms and resources available to patient including: Specialist.     Patients top risk factors for readmission:  NA    Interventions to address risk factors:  NA    [facility] follow up appointment(s): No future appointments. [facility] follow up appointment(s): 4/27 ortho    CTN provided contact information for future needs. Plan for follow-up call in 10-14 days based on severity of symptoms and risk factors. Pt aware of next fu call in 2 weeks  Plan for next call: follow up appointment-ortho       Goals Addressed                   This Visit's Progress     Prevent complications post hospitalization.           04/19/23  Absence of falls  Will arrange fu appts with Dr. Luisana Eason, GI  Will attend fu appt with ortho scheduled 4/27  Will complete abx therapy  Will have reg Bms    04/26/23  No falls   Will resume OP PT next week  Will attend fu appt with Dr. Luisana Eason scheduled 6/8  Has one more day of abs  Continues with diarrhea 2/2 abx

## 2023-05-01 RX ORDER — CELECOXIB 200 MG/1
200 CAPSULE ORAL 2 TIMES DAILY
COMMUNITY
Start: 2023-04-11 | End: 2023-05-31

## 2023-05-01 RX ORDER — ASPIRIN 81 MG/1
81 TABLET ORAL 2 TIMES DAILY
Qty: 60 TABLET | Refills: 0 | COMMUNITY
Start: 2023-04-11 | End: 2023-05-11

## 2023-05-01 RX ORDER — FAMOTIDINE 20 MG/1
20 TABLET, FILM COATED ORAL 2 TIMES DAILY
Qty: 60 TABLET | Refills: 0 | COMMUNITY
Start: 2023-04-18 | End: 2023-05-31

## 2023-05-30 NOTE — PROGRESS NOTES
ICD-10-CM    1. Essential (primary) hypertension  I10 Comprehensive Metabolic Panel      2. Malignant melanoma of other part of trunk ([facility]  C43.59       3. Pure hypercholesterolemia, unspecified  E78.00 Lipid Panel      4. Rheumatoid arthritis, involving unspecified site, unspecified whether rheumatoid factor present ([facility]  M06.9 CBC      5. Long term current use of immunosuppressive drug  Z79.899       6. Vitamin D deficiency  E55.9 Vitamin D 25 Hydroxy      7. Screening for viral disease  Z11.59 Hepatitis C Antibody               Subjective:     Chief Complaint   Patient presents with    Wenceslao Garrison is a 78 y.o. female. she  has a past medical history of Former smoker, stopped smoking in distant past, History of abdominal pain, History of colitis, History of echocardiogram, History of left heart catheterization, History of melanoma, Hypercholesterolemia, Hypertension, Long term current use of immunosuppressive drug, Lumbar degenerative disc disease, Rheumatoid arthritis ([facility] Transaminitis, and Vitamin D deficiency. her last appointment in this clinic was 4/25/2023 . I reviewed and updated the medical record. A 71-year-old white female was last seen in late April 2023 for a follow-up appointment post her recent hospitalization due to colitis, presumably of infectious origin. She has a history of rheumatoid arthritis and was on etanercept therapy. After being treated with empiric antibiotics, she reported improvement in her diarrhea symptoms at the time of the follow-up. At this point, she had not yet restarted etanercept. Her examination at the time was unremarkable. She was advised to continue her usual care and instructed to consult with her rheumatologist regarding the appropriate timing for restarting immunosuppressive agents. Today, the patient presents for a routine follow-up visit.  Her last visit pertained to a post-hospitalization follow-up for colitis, which has since

## 2023-05-31 ENCOUNTER — OFFICE VISIT (OUTPATIENT)
Facility: CLINIC | Age: 68
End: 2023-05-31
Payer: MEDICARE

## 2023-05-31 VITALS
HEART RATE: 75 BPM | OXYGEN SATURATION: 100 % | DIASTOLIC BLOOD PRESSURE: 80 MMHG | WEIGHT: 174.1 LBS | TEMPERATURE: 98.1 F | HEIGHT: 69 IN | BODY MASS INDEX: 25.79 KG/M2 | SYSTOLIC BLOOD PRESSURE: 120 MMHG

## 2023-05-31 DIAGNOSIS — Z79.899 LONG TERM CURRENT USE OF IMMUNOSUPPRESSIVE DRUG: ICD-10-CM

## 2023-05-31 DIAGNOSIS — E78.00 PURE HYPERCHOLESTEROLEMIA, UNSPECIFIED: ICD-10-CM

## 2023-05-31 DIAGNOSIS — E55.9 VITAMIN D DEFICIENCY: ICD-10-CM

## 2023-05-31 DIAGNOSIS — M06.9 RHEUMATOID ARTHRITIS, INVOLVING UNSPECIFIED SITE, UNSPECIFIED WHETHER RHEUMATOID FACTOR PRESENT (HCC): ICD-10-CM

## 2023-05-31 DIAGNOSIS — I10 ESSENTIAL (PRIMARY) HYPERTENSION: Primary | ICD-10-CM

## 2023-05-31 DIAGNOSIS — Z11.59 SCREENING FOR VIRAL DISEASE: ICD-10-CM

## 2023-05-31 DIAGNOSIS — C43.59 MALIGNANT MELANOMA OF OTHER PART OF TRUNK (HCC): ICD-10-CM

## 2023-05-31 LAB
ALBUMIN SERPL-MCNC: 4.5 G/DL (ref 3.5–5)
ALBUMIN/GLOB SERPL: 1.3 (ref 1.1–2.2)
ALP SERPL-CCNC: 108 U/L (ref 45–117)
ALT SERPL-CCNC: 28 U/L (ref 12–78)
ANION GAP SERPL CALC-SCNC: 6 MMOL/L (ref 5–15)
AST SERPL-CCNC: 20 U/L (ref 15–37)
BILIRUB SERPL-MCNC: 0.4 MG/DL (ref 0.2–1)
BUN SERPL-MCNC: 13 MG/DL (ref 6–20)
BUN/CREAT SERPL: 25 (ref 12–20)
CALCIUM SERPL-MCNC: 10.4 MG/DL (ref 8.5–10.1)
CHLORIDE SERPL-SCNC: 99 MMOL/L (ref 97–108)
CHOLEST SERPL-MCNC: 201 MG/DL
CO2 SERPL-SCNC: 29 MMOL/L (ref 21–32)
CREAT SERPL-MCNC: 0.51 MG/DL (ref 0.55–1.02)
ERYTHROCYTE [DISTWIDTH] IN BLOOD BY AUTOMATED COUNT: 12.7 % (ref 11.5–14.5)
GLOBULIN SER CALC-MCNC: 3.5 G/DL (ref 2–4)
GLUCOSE SERPL-MCNC: 86 MG/DL (ref 65–100)
HCT VFR BLD AUTO: 42.1 % (ref 35–47)
HCV AB SERPL QL IA: NONREACTIVE
HDLC SERPL-MCNC: 61 MG/DL
HDLC SERPL: 3.3 (ref 0–5)
HGB BLD-MCNC: 13 G/DL (ref 11.5–16)
LDLC SERPL CALC-MCNC: 118.2 MG/DL (ref 0–100)
MCH RBC QN AUTO: 30 PG (ref 26–34)
MCHC RBC AUTO-ENTMCNC: 30.9 G/DL (ref 30–36.5)
MCV RBC AUTO: 97 FL (ref 80–99)
NRBC # BLD: 0 K/UL (ref 0–0.01)
NRBC BLD-RTO: 0 PER 100 WBC
PLATELET # BLD AUTO: 356 K/UL (ref 150–400)
PMV BLD AUTO: 8.8 FL (ref 8.9–12.9)
POTASSIUM SERPL-SCNC: 4.7 MMOL/L (ref 3.5–5.1)
PROT SERPL-MCNC: 8 G/DL (ref 6.4–8.2)
RBC # BLD AUTO: 4.34 M/UL (ref 3.8–5.2)
SODIUM SERPL-SCNC: 134 MMOL/L (ref 136–145)
TRIGL SERPL-MCNC: 109 MG/DL
VLDLC SERPL CALC-MCNC: 21.8 MG/DL
WBC # BLD AUTO: 5.2 K/UL (ref 3.6–11)

## 2023-05-31 PROCEDURE — 3074F SYST BP LT 130 MM HG: CPT | Performed by: INTERNAL MEDICINE

## 2023-05-31 PROCEDURE — 3079F DIAST BP 80-89 MM HG: CPT | Performed by: INTERNAL MEDICINE

## 2023-05-31 PROCEDURE — 1123F ACP DISCUSS/DSCN MKR DOCD: CPT | Performed by: INTERNAL MEDICINE

## 2023-05-31 PROCEDURE — 99214 OFFICE O/P EST MOD 30 MIN: CPT | Performed by: INTERNAL MEDICINE

## 2023-05-31 ASSESSMENT — PATIENT HEALTH QUESTIONNAIRE - PHQ9
SUM OF ALL RESPONSES TO PHQ QUESTIONS 1-9: 0
SUM OF ALL RESPONSES TO PHQ9 QUESTIONS 1 & 2: 0
SUM OF ALL RESPONSES TO PHQ QUESTIONS 1-9: 0
2. FEELING DOWN, DEPRESSED OR HOPELESS: 0
1. LITTLE INTEREST OR PLEASURE IN DOING THINGS: 0

## 2023-05-31 ASSESSMENT — ENCOUNTER SYMPTOMS
RESPIRATORY NEGATIVE: 1
ALLERGIC/IMMUNOLOGIC NEGATIVE: 1
EYES NEGATIVE: 1
GASTROINTESTINAL NEGATIVE: 1

## 2023-05-31 NOTE — PROGRESS NOTES
Chief Complaint   Patient presents with    Follow-up     /80 (Site: Left Upper Arm, Position: Sitting, Cuff Size: Medium Adult)   Pulse 75   Temp 98.1 °F (36.7 °C) (Oral)   Ht 5' 9\" (1.753 m)   Wt 174 lb 1.6 oz (79 kg)   SpO2 100%   BMI 25.71 kg/m²       1. \"Have you been to the ER, urgent care clinic since your last visit? Hospitalized since your last visit? \" NO    2. \"Have you seen or consulted any other health care providers outside of the [address] [place] [address] since your last visit? \" NO     3. For patients aged 39-70: Has the patient had a colonoscopy / FIT/ Cologuard? No      If the patient is female:    4. For patients aged 41-77: Has the patient had a mammogram within the past 2 years? NO      5. For patients aged 21-65: Has the patient had a pap smear?  NO

## 2023-06-01 LAB — 25(OH)D3 SERPL-MCNC: 22.7 NG/ML (ref 30–100)

## 2023-06-02 NOTE — RESULT ENCOUNTER NOTE
I reviewed these results. Please notify the patient. Vitamin D remains low. If patient is already taking over-the-counter supplementation, the higher dose supplementation may be a consideration. Chemistries generally unremarkable. LDL remains stable although elevated and not quite at target less than 100 mg/dL. Consider statin medication. 10-year Cardiovascular Risk Orford Nola, 2013):   The 10-year ASCVD risk score (Charity BANG, et al., 2019) is: 7%    Values used to calculate the score:      Age: 78 years      Sex: Female      Is Non- : No      Diabetic: No      Tobacco smoker: No      Systolic Blood Pressure: 179 mmHg      Is BP treated: Yes      HDL Cholesterol: 61 MG/DL      Total Cholesterol: 201 MG/DL

## 2023-08-16 ENCOUNTER — TELEPHONE (OUTPATIENT)
Age: 68
End: 2023-08-16

## 2023-08-16 NOTE — TELEPHONE ENCOUNTER
----- Message from Werner Ortega sent at 8/16/2023  3:57 PM EDT -----  Subject: Appointment Request    Reason for Call: New Patient/New to Provider Appointment needed: New   Patient Request Appointment    QUESTIONS    Reason for appointment request? No appointments available during search     Additional Information for Provider?   ---------------------------------------------------------------------------  --------------  [address]  [phone]; OK to leave message on voicemail  ---------------------------------------------------------------------------  --------------  SCRIPT ANSWERS

## 2023-10-25 RX ORDER — VALSARTAN 320 MG/1
320 TABLET ORAL DAILY
Qty: 90 TABLET | Refills: 1 | Status: SHIPPED | OUTPATIENT
Start: 2023-10-25

## 2023-10-25 NOTE — TELEPHONE ENCOUNTER
Pt requesting 90 day supply of medication to [facility] on file. She is establishing with a new provider in March (first opening available).      Medication  valsartan (DIOVAN) 320 MG tablet [[id number]]  valsartan (DIOVAN) 320 MG tablet [[id number]]     Order Details  Dose: 320 mg Route: Oral Frequency: DAILY   Dispense Quantity: -- Refills: --          Sig: Take 1 tablet by mouth daily         Start Date: 01/13/23 End Date: --   Written Date: -- Expiration Date: --   Ordering Date: 03/17/23     Source:  Received from: [facility]     amLODIPine (NORVASC) 2.5 MG tablet [[id number]]     Order Details  Dose: 2.5 mg Route: Oral Frequency: DAILY   Dispense Quantity: -- Refills: --          Sig: Take 1 tablet by mouth daily         Start Date: 01/13/23 End Date: --   Written Date: -- Expiration Date: --   Ordering Date: 03/17/23     Source:  Received from: [facility]

## 2023-11-06 RX ORDER — AMLODIPINE BESYLATE 2.5 MG/1
2.5 TABLET ORAL DAILY
Qty: 30 TABLET | Refills: 0 | Status: SHIPPED | OUTPATIENT
Start: 2023-11-06

## 2023-11-06 NOTE — TELEPHONE ENCOUNTER
PCP: Mary Salazar MD    Last appt: 5/31/2023    Future Appointments   Date Time Provider [facility]   3/15/2024  8:30 AM Appa Juliet Goodman MD [facility] BS AMB       Requested Prescriptions     Pending Prescriptions Disp Refills    amLODIPine (NORVASC) 2.5 MG tablet 30 tablet 0     Sig: Take 1 tablet by mouth daily

## 2023-11-06 NOTE — TELEPHONE ENCOUNTER
Pharmacy: [address] [place] [address]  Patient requesting 90 day supply.     amLODIPine (NORVASC) 2.5 mg tablet 90 Tablet 0 4/17/2023    Sig: TAKE ONE TABLET BY MOUTH DAILY

## 2023-11-30 RX ORDER — AMLODIPINE BESYLATE 2.5 MG/1
2.5 TABLET ORAL DAILY
Qty: 90 TABLET | Refills: 0 | Status: SHIPPED | OUTPATIENT
Start: 2023-11-30

## 2023-11-30 NOTE — TELEPHONE ENCOUNTER
PCP: Fei Rendon MD    Last appt: 5/31/2023    Future Appointments   Date Time Provider Department Center   3/15/2024  8:30 AM Appa Debbie Ellington MD [facility] BS AMB       Requested Prescriptions     Pending Prescriptions Disp Refills    amLODIPine (NORVASC) 2.5 MG tablet [Pharmacy Med Name: amLODIPine Besylate 2.5 MG Oral Tablet] 30 tablet 0     Sig: Take 1 tablet by mouth once daily

## 2024-03-06 RX ORDER — AMLODIPINE BESYLATE 2.5 MG/1
2.5 TABLET ORAL DAILY
Qty: 90 TABLET | Refills: 0 | Status: SHIPPED | OUTPATIENT
Start: 2024-03-06

## 2024-03-06 NOTE — TELEPHONE ENCOUNTER
Last Refill: 11-30-23  Last Visit: 5-31-23 [facility]  Next Visit: 3-15-24 Debbie Flores MD     Requested Prescriptions     Pending Prescriptions Disp Refills    amLODIPine (NORVASC) 2.5 MG tablet [Pharmacy Med Name: amLODIPine Besylate 2.5 MG Oral Tablet] 90 tablet 0     Sig: Take 1 tablet by mouth once daily

## 2024-03-12 SDOH — HEALTH STABILITY: PHYSICAL HEALTH: ON AVERAGE, HOW MANY MINUTES DO YOU ENGAGE IN EXERCISE AT THIS LEVEL?: 60 MIN

## 2024-03-12 SDOH — HEALTH STABILITY: PHYSICAL HEALTH: ON AVERAGE, HOW MANY DAYS PER WEEK DO YOU ENGAGE IN MODERATE TO STRENUOUS EXERCISE (LIKE A BRISK WALK)?: 7 DAYS

## 2024-03-15 ENCOUNTER — OFFICE VISIT (OUTPATIENT)
Age: 69
End: 2024-03-15
Payer: MEDICARE

## 2024-03-15 VITALS
DIASTOLIC BLOOD PRESSURE: 78 MMHG | HEART RATE: 72 BPM | RESPIRATION RATE: 18 BRPM | HEIGHT: 69 IN | BODY MASS INDEX: 27.02 KG/M2 | TEMPERATURE: 98.1 F | SYSTOLIC BLOOD PRESSURE: 159 MMHG | OXYGEN SATURATION: 98 % | WEIGHT: 182.4 LBS

## 2024-03-15 DIAGNOSIS — M06.9 RHEUMATOID ARTHRITIS, INVOLVING UNSPECIFIED SITE, UNSPECIFIED WHETHER RHEUMATOID FACTOR PRESENT (HCC): ICD-10-CM

## 2024-03-15 DIAGNOSIS — I10 ESSENTIAL (PRIMARY) HYPERTENSION: Primary | ICD-10-CM

## 2024-03-15 DIAGNOSIS — M20.42 HAMMER TOE OF LEFT FOOT: ICD-10-CM

## 2024-03-15 DIAGNOSIS — R63.5 WEIGHT GAIN: ICD-10-CM

## 2024-03-15 DIAGNOSIS — C43.59 MALIGNANT MELANOMA OF OTHER PART OF TRUNK (HCC): ICD-10-CM

## 2024-03-15 DIAGNOSIS — E78.00 HIGH CHOLESTEROL: ICD-10-CM

## 2024-03-15 DIAGNOSIS — E55.9 VITAMIN D DEFICIENCY: ICD-10-CM

## 2024-03-15 LAB
25(OH)D3 SERPL-MCNC: 20.3 NG/ML (ref 30–100)
ALBUMIN SERPL-MCNC: 4.2 G/DL (ref 3.5–5)
ALBUMIN/GLOB SERPL: 1.2 (ref 1.1–2.2)
ALP SERPL-CCNC: 106 U/L (ref 45–117)
ALT SERPL-CCNC: 33 U/L (ref 12–78)
ANION GAP SERPL CALC-SCNC: 3 MMOL/L (ref 5–15)
AST SERPL-CCNC: 21 U/L (ref 15–37)
BASOPHILS # BLD: 0.1 K/UL (ref 0–0.1)
BASOPHILS NFR BLD: 1 % (ref 0–1)
BILIRUB SERPL-MCNC: 0.3 MG/DL (ref 0.2–1)
BUN SERPL-MCNC: 13 MG/DL (ref 6–20)
BUN/CREAT SERPL: 23 (ref 12–20)
CALCIUM SERPL-MCNC: 10.2 MG/DL (ref 8.5–10.1)
CHLORIDE SERPL-SCNC: 102 MMOL/L (ref 97–108)
CHOLEST SERPL-MCNC: 195 MG/DL
CO2 SERPL-SCNC: 27 MMOL/L (ref 21–32)
CREAT SERPL-MCNC: 0.57 MG/DL (ref 0.55–1.02)
DIFFERENTIAL METHOD BLD: NORMAL
EOSINOPHIL # BLD: 0.2 K/UL (ref 0–0.4)
EOSINOPHIL NFR BLD: 4 % (ref 0–7)
ERYTHROCYTE [DISTWIDTH] IN BLOOD BY AUTOMATED COUNT: 12.7 % (ref 11.5–14.5)
GLOBULIN SER CALC-MCNC: 3.5 G/DL (ref 2–4)
GLUCOSE SERPL-MCNC: 99 MG/DL (ref 65–100)
HCT VFR BLD AUTO: 41.1 % (ref 35–47)
HDLC SERPL-MCNC: 71 MG/DL
HDLC SERPL: 2.7 (ref 0–5)
HGB BLD-MCNC: 13.6 G/DL (ref 11.5–16)
IMM GRANULOCYTES # BLD AUTO: 0 K/UL (ref 0–0.04)
IMM GRANULOCYTES NFR BLD AUTO: 0 % (ref 0–0.5)
LDLC SERPL CALC-MCNC: 108.2 MG/DL (ref 0–100)
LYMPHOCYTES # BLD: 1.5 K/UL (ref 0.8–3.5)
LYMPHOCYTES NFR BLD: 27 % (ref 12–49)
MCH RBC QN AUTO: 30.8 PG (ref 26–34)
MCHC RBC AUTO-ENTMCNC: 33.1 G/DL (ref 30–36.5)
MCV RBC AUTO: 93 FL (ref 80–99)
MONOCYTES # BLD: 0.6 K/UL (ref 0–1)
MONOCYTES NFR BLD: 11 % (ref 5–13)
NEUTS SEG # BLD: 3 K/UL (ref 1.8–8)
NEUTS SEG NFR BLD: 57 % (ref 32–75)
NRBC # BLD: 0 K/UL (ref 0–0.01)
NRBC BLD-RTO: 0 PER 100 WBC
PLATELET # BLD AUTO: 266 K/UL (ref 150–400)
PMV BLD AUTO: 9.9 FL (ref 8.9–12.9)
POTASSIUM SERPL-SCNC: 5 MMOL/L (ref 3.5–5.1)
PROT SERPL-MCNC: 7.7 G/DL (ref 6.4–8.2)
RBC # BLD AUTO: 4.42 M/UL (ref 3.8–5.2)
SODIUM SERPL-SCNC: 132 MMOL/L (ref 136–145)
TRIGL SERPL-MCNC: 79 MG/DL
TSH SERPL DL<=0.05 MIU/L-ACNC: 1.45 UIU/ML (ref 0.36–3.74)
VLDLC SERPL CALC-MCNC: 15.8 MG/DL
WBC # BLD AUTO: 5.3 K/UL (ref 3.6–11)

## 2024-03-15 PROCEDURE — 1090F PRES/ABSN URINE INCON ASSESS: CPT | Performed by: INTERNAL MEDICINE

## 2024-03-15 PROCEDURE — 3017F COLORECTAL CA SCREEN DOC REV: CPT | Performed by: INTERNAL MEDICINE

## 2024-03-15 PROCEDURE — 99205 OFFICE O/P NEW HI 60 MIN: CPT | Performed by: INTERNAL MEDICINE

## 2024-03-15 PROCEDURE — G8419 CALC BMI OUT NRM PARAM NOF/U: HCPCS | Performed by: INTERNAL MEDICINE

## 2024-03-15 PROCEDURE — G8427 DOCREV CUR MEDS BY ELIG CLIN: HCPCS | Performed by: INTERNAL MEDICINE

## 2024-03-15 PROCEDURE — 3078F DIAST BP <80 MM HG: CPT | Performed by: INTERNAL MEDICINE

## 2024-03-15 PROCEDURE — 3077F SYST BP >= 140 MM HG: CPT | Performed by: INTERNAL MEDICINE

## 2024-03-15 PROCEDURE — G8484 FLU IMMUNIZE NO ADMIN: HCPCS | Performed by: INTERNAL MEDICINE

## 2024-03-15 PROCEDURE — G8399 PT W/DXA RESULTS DOCUMENT: HCPCS | Performed by: INTERNAL MEDICINE

## 2024-03-15 PROCEDURE — 1036F TOBACCO NON-USER: CPT | Performed by: INTERNAL MEDICINE

## 2024-03-15 PROCEDURE — 1123F ACP DISCUSS/DSCN MKR DOCD: CPT | Performed by: INTERNAL MEDICINE

## 2024-03-15 RX ORDER — MAGNESIUM GLUCONATE 27 MG(500)
250 TABLET ORAL DAILY
COMMUNITY

## 2024-03-15 RX ORDER — AMLODIPINE BESYLATE 2.5 MG/1
2.5 TABLET ORAL DAILY
Qty: 90 TABLET | Refills: 0 | Status: CANCELLED | OUTPATIENT
Start: 2024-03-15

## 2024-03-15 RX ORDER — AMLODIPINE BESYLATE 5 MG/1
5 TABLET ORAL DAILY
Qty: 90 TABLET | Refills: 0
Start: 2024-03-15

## 2024-03-15 RX ORDER — MULTIVITAMIN WITH IRON
100 TABLET ORAL DAILY
COMMUNITY

## 2024-03-15 RX ORDER — VALSARTAN 320 MG/1
320 TABLET ORAL DAILY
Qty: 90 TABLET | Refills: 1 | Status: SHIPPED | OUTPATIENT
Start: 2024-03-15

## 2024-03-15 SDOH — ECONOMIC STABILITY: FOOD INSECURITY: WITHIN THE PAST 12 MONTHS, THE FOOD YOU BOUGHT JUST DIDN'T LAST AND YOU DIDN'T HAVE MONEY TO GET MORE.: NEVER TRUE

## 2024-03-15 SDOH — ECONOMIC STABILITY: FOOD INSECURITY: WITHIN THE PAST 12 MONTHS, YOU WORRIED THAT YOUR FOOD WOULD RUN OUT BEFORE YOU GOT MONEY TO BUY MORE.: NEVER TRUE

## 2024-03-15 SDOH — ECONOMIC STABILITY: INCOME INSECURITY: HOW HARD IS IT FOR YOU TO PAY FOR THE VERY BASICS LIKE FOOD, HOUSING, MEDICAL CARE, AND HEATING?: NOT HARD AT ALL

## 2024-03-15 SDOH — ECONOMIC STABILITY: HOUSING INSECURITY
IN THE LAST 12 MONTHS, WAS THERE A TIME WHEN YOU DID NOT HAVE A STEADY PLACE TO SLEEP OR SLEPT IN A SHELTER (INCLUDING NOW)?: NO

## 2024-03-15 ASSESSMENT — PATIENT HEALTH QUESTIONNAIRE - PHQ9
2. FEELING DOWN, DEPRESSED OR HOPELESS: 0
SUM OF ALL RESPONSES TO PHQ QUESTIONS 1-9: 0
1. LITTLE INTEREST OR PLEASURE IN DOING THINGS: 0
SUM OF ALL RESPONSES TO PHQ QUESTIONS 1-9: 0
SUM OF ALL RESPONSES TO PHQ QUESTIONS 1-9: 0
SUM OF ALL RESPONSES TO PHQ9 QUESTIONS 1 & 2: 0
SUM OF ALL RESPONSES TO PHQ QUESTIONS 1-9: 0

## 2024-03-15 NOTE — PROGRESS NOTES
Chief Complaint   Patient presents with    New Patient    Hypertension     \"Have you been to the ER, urgent care clinic since your last visit?  Hospitalized since your last visit?\"    NO    “Have you seen or consulted any other health care providers outside of [facility] since your last visit?”    NO           
Ms. Terri Boswell is a new patient who is here to establish care.     CC:  New Patient and Hypertension       HPI:      Patient of [facility] since the 80s retired then briefly saw Dr Cox  Reviewed records    Terri Boswell is a 68 y.o. with a hx of history of melanoma, hypercholesterolemia, hypertension, Long term current use of immunosuppressive drug for RA ( Dr Denzel Daniels) , Lumbar degenerative disc disease presenting to establish care.      Right knee replacement one year ago and had weight increase and \" taking longer than expected to improve\"  Doing pool exercises 4-5 times a week \" I swim a mile or more\"     HTN: currently on amlodipine 2.5mg and valsartan 320mg   Brought home monitoring 164/84  159/91  168/74  153/65  152/74  Today elevated blood pressure    No chest pain, shortness of breath, edema, or personal history of cardiovascular or cerebrovascular disease are reported.     Did not tolerate diuretics caused low sodium     RA is followed by Dr Daniels reviewed last note from  Feb 3034  \"Rheumatoid arthritis, stable on Enbrel, Sulfasalazine and Hydroxychloroquine\"     Dr Mays is eye doctor       Colonoscopy 2021     dexa 2023 mild osteopenia       She continues her regular women's health follow-up at [facility]  family history of breast cancer in her mother.   Her last colonoscopy was in October 2021, with a 5-year follow-up recommended  prior history of malignant melanoma, she continues her dermatology visits every six months for routine skin surveys.  History of melanoma removed in fall 2006, again in spring 2016, no recent recurrences   Review of systems:  Constitutional: negative for fever, chills, weight loss, night sweats   Eyes : negative for vision changes, eye pain and discharge  Nose and Throat: negative for tinnitus, sore throat   Cardiovascular: negative for chest pain, palpitations and shortness of breath  Respiratory: negative for shortness of breath, cough and wheezing 
chest wall tenderness.  Cardiovascular:  RRR, normal S1S2, no murmur.    Gastrointestinal: normal bowel sounds, soft, nontender, without masses.  No hepatosplenomegaly.  Extremities +2 pulses, no edema, normal sensation   Musculoskeletal:  Normal gait. Normal digits and nails.  Normal strength and tone, no atrophy, and no abnormal movement.  Skin:  No rash, no lesions, no ulcers.  Skin warm, normal turgor, without induration or nodules.  Neuro:  A and OX4, fluent speech, cranial nerves normal 2-12.  Sensation normal to light touch.  DTR symmetrical  Psych:  Normal affect      Lab Results   Component Value Date/Time    WBC 5.2 05/31/2023 09:26 AM    HGB 13.0 05/31/2023 09:26 AM    HCT 42.1 05/31/2023 09:26 AM     05/31/2023 09:26 AM    MCV 97.0 05/31/2023 09:26 AM     Lab Results   Component Value Date/Time     05/31/2023 09:26 AM    K 4.7 05/31/2023 09:26 AM    CL 99 05/31/2023 09:26 AM    CO2 29 05/31/2023 09:26 AM    BUN 13 05/31/2023 09:26 AM    GFRAA >60 05/27/2022 08:58 AM     Lab Results   Component Value Date/Time    CHOL 201 05/31/2023 09:26 AM    HDL 61 05/31/2023 09:26 AM    VLDL 12 08/21/2019 03:29 PM     Lab Results   Component Value Date/Time    TSH 1.51 05/14/2021 10:50 AM     No results found for: \"HBA1C\", \"TAF2OELQ\"  No results found for: \"VITD3\", \"VD3RIA\"                Assessment and Plan:     [unfilled]    No follow-up provider specified.     Debbie Ellington MD

## 2024-03-28 ENCOUNTER — TELEPHONE (OUTPATIENT)
Age: 69
End: 2024-03-28

## 2024-03-28 NOTE — TELEPHONE ENCOUNTER
F/U Regarding visit on:   3/15/2024       Caller states:  Concern:   States read note on lab results about vit D  dosage recommended  Question:  Already was taking vit D3 , 2000 units daily  Does she stop taking the D3 and start taking the D4 that Dr smalls just sent in ?  Asks for clarification    Request:   Call or [employer] message to clarify (mychart is preferred)

## 2024-03-29 NOTE — TELEPHONE ENCOUNTER
Spoke to pt and used two pt identifiers.  Patient notified of response from Debbie Flores MD    States understanding

## 2024-04-18 DIAGNOSIS — I10 ESSENTIAL (PRIMARY) HYPERTENSION: ICD-10-CM

## 2024-04-18 NOTE — TELEPHONE ENCOUNTER
PCP: Debbie Flores MD    Last appt: Visit date not found    Future Appointments   Date Time Provider Department Center   6/17/2024  9:30 AM Debbie Flores MD [facility]       Requested Prescriptions     Pending Prescriptions Disp Refills    amLODIPine (NORVASC) 2.5 MG tablet [Pharmacy Med Name: amLODIPine Besylate 2.5 MG Oral Tablet] 90 tablet 0     Sig: Take 1 tablet by mouth once daily

## 2024-04-19 RX ORDER — AMLODIPINE BESYLATE 5 MG/1
5 TABLET ORAL DAILY
Qty: 90 TABLET | Refills: 0 | Status: SHIPPED | OUTPATIENT
Start: 2024-04-19

## 2024-04-19 RX ORDER — AMLODIPINE BESYLATE 2.5 MG/1
2.5 TABLET ORAL DAILY
Qty: 90 TABLET | Refills: 0 | OUTPATIENT
Start: 2024-04-19

## 2024-06-03 ENCOUNTER — TELEPHONE (OUTPATIENT)
Age: 69
End: 2024-06-03

## 2024-06-03 NOTE — TELEPHONE ENCOUNTER
----- Message from Libra Holleyal sent at 6/3/2024  8:29 AM EDT -----  Subject: Appointment Request    Reason for Call: Established Patient Appointment needed: Routine Pre-Op    QUESTIONS    Reason for appointment request? No appointments available during search     Additional Information for Provider? pre op for carpel tunnel surgery TBD   w/Dr. JOSUE Nielsen , incl. EKG, also pt is asking if aptmt will be on same   date as 6/17 HTN date?  ---------------------------------------------------------------------------  --------------  CALL BACK INFO  [phone]; OK to leave message on voicemail  ---------------------------------------------------------------------------  --------------  SCRIPT ANSWERS

## 2024-06-17 ENCOUNTER — OFFICE VISIT (OUTPATIENT)
Age: 69
End: 2024-06-17
Payer: MEDICARE

## 2024-06-17 VITALS
DIASTOLIC BLOOD PRESSURE: 70 MMHG | HEART RATE: 64 BPM | TEMPERATURE: 97.8 F | OXYGEN SATURATION: 96 % | HEIGHT: 69 IN | SYSTOLIC BLOOD PRESSURE: 130 MMHG | RESPIRATION RATE: 18 BRPM | WEIGHT: 183 LBS | BODY MASS INDEX: 27.11 KG/M2

## 2024-06-17 DIAGNOSIS — I10 ESSENTIAL (PRIMARY) HYPERTENSION: Primary | ICD-10-CM

## 2024-06-17 DIAGNOSIS — M06.9 RHEUMATOID ARTHRITIS, INVOLVING UNSPECIFIED SITE, UNSPECIFIED WHETHER RHEUMATOID FACTOR PRESENT (HCC): ICD-10-CM

## 2024-06-17 DIAGNOSIS — E78.00 PURE HYPERCHOLESTEROLEMIA, UNSPECIFIED: ICD-10-CM

## 2024-06-17 DIAGNOSIS — I10 ESSENTIAL (PRIMARY) HYPERTENSION: ICD-10-CM

## 2024-06-17 DIAGNOSIS — E55.9 VITAMIN D DEFICIENCY: ICD-10-CM

## 2024-06-17 DIAGNOSIS — Z01.810 PRE-OPERATIVE CARDIOVASCULAR EXAMINATION: ICD-10-CM

## 2024-06-17 DIAGNOSIS — E83.52 SERUM CALCIUM ELEVATED: Primary | ICD-10-CM

## 2024-06-17 PROCEDURE — G8399 PT W/DXA RESULTS DOCUMENT: HCPCS | Performed by: INTERNAL MEDICINE

## 2024-06-17 PROCEDURE — 3075F SYST BP GE 130 - 139MM HG: CPT | Performed by: INTERNAL MEDICINE

## 2024-06-17 PROCEDURE — G8427 DOCREV CUR MEDS BY ELIG CLIN: HCPCS | Performed by: INTERNAL MEDICINE

## 2024-06-17 PROCEDURE — 1036F TOBACCO NON-USER: CPT | Performed by: INTERNAL MEDICINE

## 2024-06-17 PROCEDURE — 99214 OFFICE O/P EST MOD 30 MIN: CPT | Performed by: INTERNAL MEDICINE

## 2024-06-17 PROCEDURE — G8419 CALC BMI OUT NRM PARAM NOF/U: HCPCS | Performed by: INTERNAL MEDICINE

## 2024-06-17 PROCEDURE — 3017F COLORECTAL CA SCREEN DOC REV: CPT | Performed by: INTERNAL MEDICINE

## 2024-06-17 PROCEDURE — 93005 ELECTROCARDIOGRAM TRACING: CPT | Performed by: INTERNAL MEDICINE

## 2024-06-17 PROCEDURE — 3078F DIAST BP <80 MM HG: CPT | Performed by: INTERNAL MEDICINE

## 2024-06-17 PROCEDURE — 93010 ELECTROCARDIOGRAM REPORT: CPT | Performed by: INTERNAL MEDICINE

## 2024-06-17 PROCEDURE — 1090F PRES/ABSN URINE INCON ASSESS: CPT | Performed by: INTERNAL MEDICINE

## 2024-06-17 PROCEDURE — 1123F ACP DISCUSS/DSCN MKR DOCD: CPT | Performed by: INTERNAL MEDICINE

## 2024-06-17 NOTE — PROGRESS NOTES
for left wrist surgery with Dr. Vinh Nielsen on 06/25/2024. She reports no complications with anesthesia and is capable of performing household tasks such as vacuuming. Her physical activity includes walking between 2 and 3 miles, swimming approximately 1.5 miles, and maintaining a good exercise tolerance. Her knee condition has shown significant improvement, albeit at a slow pace. She continues to perform her prescribed exercises and is gradually losing weight, as reported by her .    HTN. She monitors her blood pressure at home, recording readings of 133/80, 131/79, and 119/74. Her amlodipine dosage is currently 5mg  No medication side effects    The patient's next dose of Enbrel is scheduled for more than a week prior to her surgery. She has been advised to skip a dose post-surgery. She typically discontinues Enbrel two weeks prior to the surgery. She is scheduled for a follow-up appointment with her rheumatologist, Dr. Daniels, in 08/2024.    Supplemental Information  She is up-to-date on her mammograms.  And colonoscopy     Colonoscopy 2021      dexa 2023 mild osteopenia         She continues her regular women's health follow-up at [facility]  family history of breast cancer in her mother.   Her last colonoscopy was in October 2021, with a 5-year follow-up recommended  prior history of malignant melanoma, she continues her dermatology visits every six months for routine skin surveys.  History of melanoma removed in fall 2006, again in spring 2016, no recent recurrences   Review of Systems       Objective   Blood pressure 130/70, pulse 64, temperature 97.8 °F (36.6 °C), resp. rate 18, height 1.753 m (5' 9\"), weight 83 kg (183 lb), SpO2 96 %.  Physical Exam  Vital Signs  The patient's blood pressure is 130/70.           The patient (or guardian, if applicable) and other individuals in attendance with the patient were advised that Artificial Intelligence will be utilized during this visit to record and

## 2024-06-18 LAB
25(OH)D3 SERPL-MCNC: 61.2 NG/ML (ref 30–100)
ALBUMIN SERPL-MCNC: 4.2 G/DL (ref 3.5–5)
ALBUMIN/GLOB SERPL: 1.2 (ref 1.1–2.2)
ALP SERPL-CCNC: 102 U/L (ref 45–117)
ALT SERPL-CCNC: 32 U/L (ref 12–78)
ANION GAP SERPL CALC-SCNC: 5 MMOL/L (ref 5–15)
AST SERPL-CCNC: 26 U/L (ref 15–37)
BASOPHILS # BLD: 0 K/UL (ref 0–0.1)
BASOPHILS NFR BLD: 0 % (ref 0–1)
BILIRUB SERPL-MCNC: 0.3 MG/DL (ref 0.2–1)
BUN SERPL-MCNC: 17 MG/DL (ref 6–20)
BUN/CREAT SERPL: 29 (ref 12–20)
CALCIUM SERPL-MCNC: 10.5 MG/DL (ref 8.5–10.1)
CHLORIDE SERPL-SCNC: 101 MMOL/L (ref 97–108)
CO2 SERPL-SCNC: 28 MMOL/L (ref 21–32)
CREAT SERPL-MCNC: 0.59 MG/DL (ref 0.55–1.02)
DIFFERENTIAL METHOD BLD: NORMAL
EOSINOPHIL # BLD: 0.2 K/UL (ref 0–0.4)
EOSINOPHIL NFR BLD: 3 % (ref 0–7)
ERYTHROCYTE [DISTWIDTH] IN BLOOD BY AUTOMATED COUNT: 12 % (ref 11.5–14.5)
GLOBULIN SER CALC-MCNC: 3.4 G/DL (ref 2–4)
GLUCOSE SERPL-MCNC: 90 MG/DL (ref 65–100)
HCT VFR BLD AUTO: 39.8 % (ref 35–47)
HGB BLD-MCNC: 12.8 G/DL (ref 11.5–16)
IMM GRANULOCYTES # BLD AUTO: 0 K/UL
IMM GRANULOCYTES NFR BLD AUTO: 0 %
LYMPHOCYTES # BLD: 2 K/UL (ref 0.8–3.5)
LYMPHOCYTES NFR BLD: 35 % (ref 12–49)
MCH RBC QN AUTO: 30.7 PG (ref 26–34)
MCHC RBC AUTO-ENTMCNC: 32.2 G/DL (ref 30–36.5)
MCV RBC AUTO: 95.4 FL (ref 80–99)
MONOCYTES # BLD: 0.7 K/UL (ref 0–1)
MONOCYTES NFR BLD: 13 % (ref 5–13)
NEUTS SEG # BLD: 2.7 K/UL (ref 1.8–8)
NEUTS SEG NFR BLD: 49 % (ref 32–75)
NRBC # BLD: 0 K/UL (ref 0–0.01)
NRBC BLD-RTO: 0 PER 100 WBC
PLATELET # BLD AUTO: 282 K/UL (ref 150–400)
PMV BLD AUTO: 9 FL (ref 8.9–12.9)
POTASSIUM SERPL-SCNC: 4.9 MMOL/L (ref 3.5–5.1)
PROT SERPL-MCNC: 7.6 G/DL (ref 6.4–8.2)
RBC # BLD AUTO: 4.17 M/UL (ref 3.8–5.2)
RBC MORPH BLD: NORMAL
RBC MORPH BLD: NORMAL
SODIUM SERPL-SCNC: 134 MMOL/L (ref 136–145)
WBC # BLD AUTO: 5.6 K/UL (ref 3.6–11)

## 2024-06-19 ENCOUNTER — TELEPHONE (OUTPATIENT)
Age: 69
End: 2024-06-19

## 2024-06-19 NOTE — TELEPHONE ENCOUNTER
Dawn with [facility] Dr. Nielsen needs pre op H&P faxed to her.   Labs & EKG as well.    Fax #[phone]

## 2024-07-08 ENCOUNTER — NURSE ONLY (OUTPATIENT)
Age: 69
End: 2024-07-08

## 2024-07-08 DIAGNOSIS — E83.52 SERUM CALCIUM ELEVATED: ICD-10-CM

## 2024-07-09 LAB
ANION GAP SERPL CALC-SCNC: 5 MMOL/L (ref 5–15)
BUN SERPL-MCNC: 19 MG/DL (ref 6–20)
BUN/CREAT SERPL: 31 (ref 12–20)
CALCIUM SERPL-MCNC: 10.2 MG/DL (ref 8.5–10.1)
CALCIUM SERPL-MCNC: 10.4 MG/DL (ref 8.5–10.1)
CHLORIDE SERPL-SCNC: 103 MMOL/L (ref 97–108)
CO2 SERPL-SCNC: 26 MMOL/L (ref 21–32)
CREAT SERPL-MCNC: 0.62 MG/DL (ref 0.55–1.02)
GLUCOSE SERPL-MCNC: 84 MG/DL (ref 65–100)
POTASSIUM SERPL-SCNC: 4.8 MMOL/L (ref 3.5–5.1)
PTH-INTACT SERPL-MCNC: 61.8 PG/ML (ref 18.4–88)
SODIUM SERPL-SCNC: 134 MMOL/L (ref 136–145)

## 2024-07-11 ENCOUNTER — TELEPHONE (OUTPATIENT)
Age: 69
End: 2024-07-11

## 2024-07-11 NOTE — TELEPHONE ENCOUNTER
Pt states she got a message through My Chart and does not understand it.  This is about a scan.   Please call pt to explain.

## 2024-07-11 NOTE — TELEPHONE ENCOUNTER
Called and spoke to patient.   Went over labs and informed patient to set up apt for scan.   Patient will call to set up apt.   No other questions at this time.

## 2024-07-12 ENCOUNTER — TRANSCRIBE ORDERS (OUTPATIENT)
Facility: HOSPITAL | Age: 69
End: 2024-07-12

## 2024-07-12 DIAGNOSIS — E83.52 SERUM CALCIUM ELEVATED: Primary | ICD-10-CM

## 2024-07-16 ENCOUNTER — HOSPITAL ENCOUNTER (OUTPATIENT)
Facility: HOSPITAL | Age: 69
Discharge: HOME | End: 2024-07-19
Attending: INTERNAL MEDICINE
Payer: MEDICARE

## 2024-07-16 DIAGNOSIS — E83.52 SERUM CALCIUM ELEVATED: ICD-10-CM

## 2024-07-16 PROCEDURE — 3430000000 HC RX DIAGNOSTIC RADIOPHARMACEUTICAL: Performed by: INTERNAL MEDICINE

## 2024-07-16 PROCEDURE — 78070 PARATHYROID PLANAR IMAGING: CPT

## 2024-07-16 PROCEDURE — A9500 TC99M SESTAMIBI: HCPCS | Performed by: INTERNAL MEDICINE

## 2024-07-16 PROCEDURE — 76536 US EXAM OF HEAD AND NECK: CPT

## 2024-07-16 RX ORDER — TETRAKIS(2-METHOXYISOBUTYLISOCYANIDE)COPPER(I) TETRAFLUOROBORATE 1 MG/ML
25.2 INJECTION, POWDER, LYOPHILIZED, FOR SOLUTION INTRAVENOUS
Status: COMPLETED | OUTPATIENT
Start: 2024-07-16 | End: 2024-07-16

## 2024-07-16 RX ADMIN — TETRAKIS(2-METHOXYISOBUTYLISOCYANIDE)COPPER(I) TETRAFLUOROBORATE 25.2 MILLICURIE: 1 INJECTION, POWDER, LYOPHILIZED, FOR SOLUTION INTRAVENOUS at 11:10

## 2024-07-18 DIAGNOSIS — I10 ESSENTIAL (PRIMARY) HYPERTENSION: ICD-10-CM

## 2024-07-18 RX ORDER — AMLODIPINE BESYLATE 5 MG/1
5 TABLET ORAL DAILY
Qty: 90 TABLET | Refills: 0 | Status: SHIPPED | OUTPATIENT
Start: 2024-07-18

## 2024-07-18 NOTE — TELEPHONE ENCOUNTER
PCP: Debbie Flores MD    Last appt: 6/17/2024  Future Appointments   Date Time Provider Department Center   9/19/2024  9:45 AM Debbie Flores MD [facility]       Requested Prescriptions     Pending Prescriptions Disp Refills    amLODIPine (NORVASC) 5 MG tablet [Pharmacy Med Name: amLODIPine Besylate 5 MG Oral Tablet] 90 tablet 0     Sig: Take 1 tablet by mouth once daily

## 2024-07-19 ENCOUNTER — PATIENT MESSAGE (OUTPATIENT)
Age: 69
End: 2024-07-19

## 2024-07-19 DIAGNOSIS — E83.52 HIGH CALCIUM LEVELS: Primary | ICD-10-CM

## 2024-07-22 ENCOUNTER — LAB (OUTPATIENT)
Age: 69
End: 2024-07-22

## 2024-07-22 DIAGNOSIS — E83.52 HIGH CALCIUM LEVELS: ICD-10-CM

## 2024-07-22 NOTE — TELEPHONE ENCOUNTER
From: Terri Boswell  To: Dr. Debbie Ellington  Sent: 7/19/2024 3:35 PM EDT  Subject: NM Parathyroid Scan    I had the scan on July 16, is there any follow-up I need to do? Thank you for your help. Saima Boswell

## 2024-07-23 LAB
1,25(OH)2D3 SERPL-MCNC: 57.7 PG/ML (ref 24.8–81.5)
TSH SERPL DL<=0.05 MIU/L-ACNC: 2.11 UIU/ML (ref 0.36–3.74)

## 2024-07-26 LAB
ALBUMIN SERPL ELPH-MCNC: 4.1 G/DL (ref 2.9–4.4)
ALBUMIN/GLOB SERPL: 1.4 (ref 0.7–1.7)
ALPHA1 GLOB SERPL ELPH-MCNC: 0.2 G/DL (ref 0–0.4)
ALPHA2 GLOB SERPL ELPH-MCNC: 0.6 G/DL (ref 0.4–1)
B-GLOBULIN SERPL ELPH-MCNC: 1.1 G/DL (ref 0.7–1.3)
COMMENT: NORMAL
GAMMA GLOB SERPL ELPH-MCNC: 1.1 G/DL (ref 0.4–1.8)
GLOBULIN SER CALC-MCNC: 3 G/DL (ref 2.2–3.9)
KAPPA LC FREE SER-MCNC: 15.1 MG/L (ref 3.3–19.4)
KAPPA LC FREE/LAMBDA FREE SER: 1.14 (ref 0.26–1.65)
LABORATORY COMMENT REPORT: NORMAL
LAMBDA LC FREE SERPL-MCNC: 13.2 MG/L (ref 5.7–26.3)
M PROTEIN SERPL ELPH-MCNC: NORMAL G/DL
PROT SERPL-MCNC: 7.1 G/DL (ref 6–8.5)
REFLEX TESTING: NORMAL

## 2024-07-28 LAB — PTH RELATED PROT SERPL-SCNC: <2 PMOL/L

## 2024-09-04 ENCOUNTER — TELEPHONE (OUTPATIENT)
Age: 69
End: 2024-09-04

## 2024-09-04 ENCOUNTER — OFFICE VISIT (OUTPATIENT)
Age: 69
End: 2024-09-04

## 2024-09-04 VITALS
TEMPERATURE: 97.7 F | OXYGEN SATURATION: 97 % | DIASTOLIC BLOOD PRESSURE: 85 MMHG | HEIGHT: 68 IN | SYSTOLIC BLOOD PRESSURE: 132 MMHG | HEART RATE: 95 BPM | BODY MASS INDEX: 26.76 KG/M2 | WEIGHT: 176.6 LBS

## 2024-09-04 DIAGNOSIS — N30.00 ACUTE CYSTITIS WITHOUT HEMATURIA: Primary | ICD-10-CM

## 2024-09-04 DIAGNOSIS — R30.0 DYSURIA: ICD-10-CM

## 2024-09-04 LAB
BILIRUBIN, URINE, POC: NEGATIVE
BLOOD URINE, POC: NEGATIVE
GLUCOSE URINE, POC: NEGATIVE
KETONES, URINE, POC: NEGATIVE
LEUKOCYTE ESTERASE, URINE, POC: ABNORMAL
NITRITE, URINE, POC: POSITIVE
PH, URINE, POC: 7 (ref 4.6–8)
PROTEIN,URINE, POC: NEGATIVE
SPECIFIC GRAVITY, URINE, POC: 1.02 (ref 1–1.03)
URINALYSIS CLARITY, POC: CLEAR
URINALYSIS COLOR, POC: YELLOW
UROBILINOGEN, POC: ABNORMAL

## 2024-09-04 RX ORDER — NITROFURANTOIN 25; 75 MG/1; MG/1
100 CAPSULE ORAL 2 TIMES DAILY
Qty: 14 CAPSULE | Refills: 0 | Status: SHIPPED | OUTPATIENT
Start: 2024-09-04 | End: 2024-09-11

## 2024-09-04 NOTE — PROGRESS NOTES
Terri Boswell (:  1955) is a 69 y.o. female,New patient, here for evaluation of the following chief complaint(s):  Urinary Tract Infection, Dysuria, and Urinary Frequency (X 3 days)      Assessment & Plan :    Below is the assessment and plan developed based on review of history, physical exam and labs.  1. Dysuria  - AMB POC URINALYSIS DIP STICK MANUAL W/O MICRO  Results for orders placed or performed in visit on 24   AMB POC URINALYSIS DIP STICK MANUAL W/O MICRO   Result Value Ref Range    Color (UA POC) Yellow     Clarity (UA POC) Clear     Glucose, Urine, POC Negative     Bilirubin, Urine, POC Negative     Ketones, Urine, POC Negative     Specific Gravity, Urine, POC 1.025 1.001 - 1.035    Blood (UA POC) Negative     pH, Urine, POC 7.0 4.6 - 8.0    Protein, Urine, POC Negative     Urobilinogen, POC 0.2 mg/dL     Nitrite, Urine, POC Positive     Leukocyte Esterase, Urine, POC Trace      2. Acute cystitis without hematuria  Patient appears well, VSS, afebrile, SPO2 97%  on room air. No distress noted. The abdomen is soft without tenderness, guarding, mass, rebound or organomegaly. No CVA tenderness or inguinal adenopathy noted. Urine dipstick shows positive for nitrates and positive for leukocytes.      The patient presents with symptoms suspicious for uncomplicated acute cystitis.  No signs or symptoms suggest an infection extending beyond the bladder or sepsis, including but not limited to, costovertebral angle tenderness or signs/symptoms of systemic illness such as fever, chills, rigors, significant fatigue, or malaise beyond baseline.  Also considered, but unlikely, pelvic inflammatory disease, due to the lack of lower abdominal/pelvic pain and fever.  No hematuria with unilateral flank pain suggestive of obstructive nephrolithiasis.  Patient is nontoxic appearing and not in need of emergent medical intervention.    Will treat empirically for acute simple cystitis based on symptoms and

## 2024-09-04 NOTE — PATIENT INSTRUCTIONS
If symptoms worsens or fail to improve follow-up with PCP or ER.    Thank you for visiting [facility] today.    -Increase fluid intake.  Some people say cranberry juice helps with discomfort.  -Don't hold your urine.  Urinate when you feel like you need to.  -Wipe from front to back after bowel movements.  -If sexually active, urinate after intercourse and use enough lubrication.   -Avoid taking bubble baths.  -Wear loose fitting clothing.  -Decrease caffeine or carbonated drinks  -Repeat urine screen in two weeks  -Take antibiotic with food and consider probiotic    Follow up with your PCP if symptoms persist or worsen.    Please go to the Emergency Department immediately for symptoms of a urinary tract infection along with any of the following:  fever with severe and sudden shaking (rigors), nausea, vomiting and the inability to keep down clear fluids or medications.

## 2024-09-04 NOTE — TELEPHONE ENCOUNTER
Pt called stating possible uti    Pt offered appts same day but they conflicted with another appt [place].    Pt states will use [facility] regen.        Please follow up as needed

## 2024-09-17 SDOH — HEALTH STABILITY: PHYSICAL HEALTH: ON AVERAGE, HOW MANY DAYS PER WEEK DO YOU ENGAGE IN MODERATE TO STRENUOUS EXERCISE (LIKE A BRISK WALK)?: 7 DAYS

## 2024-09-17 SDOH — HEALTH STABILITY: PHYSICAL HEALTH: ON AVERAGE, HOW MANY MINUTES DO YOU ENGAGE IN EXERCISE AT THIS LEVEL?: 90 MIN

## 2024-09-17 ASSESSMENT — LIFESTYLE VARIABLES
HOW MANY STANDARD DRINKS CONTAINING ALCOHOL DO YOU HAVE ON A TYPICAL DAY: PATIENT DOES NOT DRINK
HOW OFTEN DO YOU HAVE SIX OR MORE DRINKS ON ONE OCCASION: 1
HOW OFTEN DO YOU HAVE A DRINK CONTAINING ALCOHOL: NEVER
HOW MANY STANDARD DRINKS CONTAINING ALCOHOL DO YOU HAVE ON A TYPICAL DAY: 0
HOW OFTEN DO YOU HAVE A DRINK CONTAINING ALCOHOL: 1

## 2024-09-17 ASSESSMENT — PATIENT HEALTH QUESTIONNAIRE - PHQ9
2. FEELING DOWN, DEPRESSED OR HOPELESS: NOT AT ALL
SUM OF ALL RESPONSES TO PHQ QUESTIONS 1-9: 0
SUM OF ALL RESPONSES TO PHQ9 QUESTIONS 1 & 2: 0
SUM OF ALL RESPONSES TO PHQ QUESTIONS 1-9: 0
1. LITTLE INTEREST OR PLEASURE IN DOING THINGS: NOT AT ALL

## 2024-09-19 ENCOUNTER — OFFICE VISIT (OUTPATIENT)
Age: 69
End: 2024-09-19

## 2024-09-19 VITALS
WEIGHT: 175.8 LBS | DIASTOLIC BLOOD PRESSURE: 82 MMHG | BODY MASS INDEX: 26.64 KG/M2 | HEART RATE: 62 BPM | RESPIRATION RATE: 18 BRPM | TEMPERATURE: 98.1 F | OXYGEN SATURATION: 98 % | HEIGHT: 68 IN | SYSTOLIC BLOOD PRESSURE: 126 MMHG

## 2024-09-19 DIAGNOSIS — Z00.00 MEDICARE ANNUAL WELLNESS VISIT, SUBSEQUENT: Primary | ICD-10-CM

## 2024-09-19 DIAGNOSIS — E78.00 PURE HYPERCHOLESTEROLEMIA, UNSPECIFIED: ICD-10-CM

## 2024-09-19 DIAGNOSIS — I10 ESSENTIAL (PRIMARY) HYPERTENSION: ICD-10-CM

## 2024-09-19 DIAGNOSIS — E83.52 HIGH CALCIUM LEVELS: ICD-10-CM

## 2024-09-20 LAB
ANION GAP SERPL CALC-SCNC: 2 MMOL/L (ref 2–12)
BUN SERPL-MCNC: 15 MG/DL (ref 6–20)
BUN/CREAT SERPL: 28 (ref 12–20)
CALCIUM SERPL-MCNC: 10.5 MG/DL (ref 8.5–10.1)
CHLORIDE SERPL-SCNC: 102 MMOL/L (ref 97–108)
CO2 SERPL-SCNC: 28 MMOL/L (ref 21–32)
CREAT SERPL-MCNC: 0.53 MG/DL (ref 0.55–1.02)
GLUCOSE SERPL-MCNC: 90 MG/DL (ref 65–100)
POTASSIUM SERPL-SCNC: 5 MMOL/L (ref 3.5–5.1)
SODIUM SERPL-SCNC: 132 MMOL/L (ref 136–145)

## 2024-09-27 ENCOUNTER — TELEPHONE (OUTPATIENT)
Age: 69
End: 2024-09-27

## 2024-09-27 NOTE — TELEPHONE ENCOUNTER
Patient called in stating she called Endocrinology and they are booking into April 2025, patient wants to know if  thinks she can wait that long or should she be referred elsewhere?    ED MD

## 2024-10-05 NOTE — TELEPHONE ENCOUNTER
Debbie,    There is no urgency to be seen for a calcium that has remained under 11.  Given her PTH is in the higher part of the normal range, she likely has primary hyperparathyroidism and even though her parathyroid scan and ultrasound were normal, these can be negative in 10% of the patients who have this condition.  Treatment is surgery so I would recommend she make an appointment with Dr. Fei Owens at this point to discuss the risks and benefits of surgery and I would stop her vitamin D as this can make her calcium worse.  I won't be seeing her sooner as I don't have anything else to add at this point and I would let her know about my thoughts of a surgical referral now.      Thanks,  Darren

## 2024-10-07 DIAGNOSIS — I10 ESSENTIAL (PRIMARY) HYPERTENSION: ICD-10-CM

## 2024-10-07 RX ORDER — VALSARTAN 320 MG/1
320 TABLET ORAL DAILY
Qty: 90 TABLET | Refills: 0 | Status: SHIPPED | OUTPATIENT
Start: 2024-10-07

## 2024-10-08 DIAGNOSIS — I10 ESSENTIAL (PRIMARY) HYPERTENSION: ICD-10-CM

## 2024-10-08 RX ORDER — AMLODIPINE BESYLATE 5 MG/1
5 TABLET ORAL DAILY
Qty: 90 TABLET | Refills: 1 | Status: SHIPPED | OUTPATIENT
Start: 2024-10-08

## 2024-10-10 ENCOUNTER — TELEPHONE (OUTPATIENT)
Age: 69
End: 2024-10-10

## 2024-10-10 NOTE — TELEPHONE ENCOUNTER
Lm to schedule consult from wq    NP hypercalcemia, appa falcao, medicare and anthem supp, notes in cc

## 2024-10-14 ENCOUNTER — PREP FOR PROCEDURE (OUTPATIENT)
Age: 69
End: 2024-10-14

## 2024-10-14 ENCOUNTER — OFFICE VISIT (OUTPATIENT)
Age: 69
End: 2024-10-14
Payer: MEDICARE

## 2024-10-14 VITALS
TEMPERATURE: 98 F | BODY MASS INDEX: 26.7 KG/M2 | WEIGHT: 176.2 LBS | HEIGHT: 68 IN | RESPIRATION RATE: 18 BRPM | HEART RATE: 64 BPM | OXYGEN SATURATION: 98 % | DIASTOLIC BLOOD PRESSURE: 72 MMHG | SYSTOLIC BLOOD PRESSURE: 168 MMHG

## 2024-10-14 DIAGNOSIS — E21.3 HYPERPARATHYROIDISM (HCC): ICD-10-CM

## 2024-10-14 DIAGNOSIS — E21.3 HYPERPARATHYROIDISM (HCC): Primary | ICD-10-CM

## 2024-10-14 PROCEDURE — 1090F PRES/ABSN URINE INCON ASSESS: CPT | Performed by: SURGERY

## 2024-10-14 PROCEDURE — G8419 CALC BMI OUT NRM PARAM NOF/U: HCPCS | Performed by: SURGERY

## 2024-10-14 PROCEDURE — 3077F SYST BP >= 140 MM HG: CPT | Performed by: SURGERY

## 2024-10-14 PROCEDURE — 99205 OFFICE O/P NEW HI 60 MIN: CPT | Performed by: SURGERY

## 2024-10-14 PROCEDURE — G8427 DOCREV CUR MEDS BY ELIG CLIN: HCPCS | Performed by: SURGERY

## 2024-10-14 PROCEDURE — G8484 FLU IMMUNIZE NO ADMIN: HCPCS | Performed by: SURGERY

## 2024-10-14 PROCEDURE — G8399 PT W/DXA RESULTS DOCUMENT: HCPCS | Performed by: SURGERY

## 2024-10-14 PROCEDURE — 1123F ACP DISCUSS/DSCN MKR DOCD: CPT | Performed by: SURGERY

## 2024-10-14 PROCEDURE — 3017F COLORECTAL CA SCREEN DOC REV: CPT | Performed by: SURGERY

## 2024-10-14 PROCEDURE — 3078F DIAST BP <80 MM HG: CPT | Performed by: SURGERY

## 2024-10-14 PROCEDURE — 1036F TOBACCO NON-USER: CPT | Performed by: SURGERY

## 2024-10-14 ASSESSMENT — ENCOUNTER SYMPTOMS
BLOOD IN STOOL: 0
EYE PAIN: 0
SHORTNESS OF BREATH: 0

## 2024-10-14 ASSESSMENT — PATIENT HEALTH QUESTIONNAIRE - PHQ9
1. LITTLE INTEREST OR PLEASURE IN DOING THINGS: NOT AT ALL
SUM OF ALL RESPONSES TO PHQ QUESTIONS 1-9: 0
SUM OF ALL RESPONSES TO PHQ9 QUESTIONS 1 & 2: 0
SUM OF ALL RESPONSES TO PHQ QUESTIONS 1-9: 0
2. FEELING DOWN, DEPRESSED OR HOPELESS: NOT AT ALL

## 2024-10-14 NOTE — PROGRESS NOTES
Identified pt with two pt identifiers (name and ). Reviewed chart in preparation for visit and have obtained necessary documentation.    Terri Boswell is a 69 y.o. female New Patient (Seen at the request of Dr GURPREET Ellington for evaluation of hypercalcemia)  .    Vitals:    10/14/24 1431 10/14/24 1450   BP: (!) 148/75 (!) 168/72   Site: Left Upper Arm Right Upper Arm   Position: Sitting Sitting   Pulse: 64    Resp: 18    Temp: 98 °F (36.7 °C)    TempSrc: Oral    SpO2: 98%    Weight: 79.9 kg (176 lb 3.2 oz)    Height: 1.727 m (5' 8\")           1. Have you been to the ER, urgent care clinic since your last visit?  Hospitalized since your last visit?  no     2. Have you seen or consulted any other health care providers outside of the [facility] since your last visit?  Include any pap smears or colon screening.  No    Patient and provider made aware of elevated BP x2. Patient asymptomatic. Patient reminded to monitor BP, continue to take BP medications if prescribed, and follow up with PCP/Cardiologist.  Patient expressed understanding and agreement.      
about the disease and its treatment.     I did my best to respond to each question, to her apparent satisfaction.  Terri Boswell and her  voice understanding of what we discussed and wishes to proceed with the operation as recommended       Thank you for this consult.        Subjective   HPI:  HPI    Elevated Ca for 1 year  Nonlocalizing on ultrasound and sestamibi    Stays active.  Swimming and exercise  Good activity level  Start developing some heartburn and reflux symptoms    RA well-controlled with a biologic      Review of Systems   Constitutional:  Negative for chills, fever and unexpected weight change.   HENT:  Negative for ear pain.    Eyes:  Negative for pain.   Respiratory:  Negative for shortness of breath.    Cardiovascular:  Negative for chest pain.   Gastrointestinal:  Negative for blood in stool.   Genitourinary:  Negative for hematuria.   Musculoskeletal:  Negative for arthralgias.   Skin:  Negative for rash.   Neurological:  Negative for dizziness, seizures, weakness and headaches.   Hematological:  Does not bruise/bleed easily.   Psychiatric/Behavioral:  Negative for confusion and sleep disturbance.             Objective   Physical Exam  Constitutional:       General: She is not in acute distress.     Appearance: She is well-developed. She is not diaphoretic.   HENT:      Head: Normocephalic and atraumatic.      Mouth/Throat:      Pharynx: No oropharyngeal exudate.   Eyes:      General: No scleral icterus.     Pupils: Pupils are equal, round, and reactive to light.   Neck:      Trachea: No tracheal deviation.   Cardiovascular:      Rate and Rhythm: Normal rate and regular rhythm.      Heart sounds: Normal heart sounds. No murmur heard.  Pulmonary:      Effort: Pulmonary effort is normal. No respiratory distress.      Breath sounds: No wheezing.   Abdominal:      General: Bowel sounds are normal. There is no distension.      Palpations: Abdomen is soft. There is no mass.      Tenderness:

## 2024-10-14 NOTE — H&P (VIEW-ONLY)
patient understands that most people have 4 parathyroid glands. Typically only one or two glands are diseased.      In 2012 the [facility] group published an article on 16,500 patients in the Journal of the American College of Surgeons showing that the only way to make certain a patient with primary hyperparathyroidism cured is if the surgeon examines all four parathyroid glands.  The authors found that taking out just one tumor shown on a scan can result in 25-30% of people needing a second operation.     I underscored the fact that most patients with hyperparathyroidism have ONE parathyroid tumor and three normal parathyroid glands.     PLAN   Thyroid parathyroid US and parathyroid Tc-99m Sestamibi scans completed  Biochemical work-up completed  Endocrinology consultation - patient's chart reviewed by Dr David Grigsby Vitamin D is protective.  Continue to hold replacement  Radioguided Mini Parathyroidectomy.    Indications of operation, nature, risks, benefits, alternatives and expected outcomes described in detail for the patient.     Specifically, the risks of parathyroid surgery were discussed including:   Anesthesia complications  Wound healing problems (cellulitis, abscess, hypertrophic scarring < 5%; increased in this patient with established keloid in a surgical wound);  Operative site bleeding necessitating re-exploration and evacuation of cervical hematoma and temporary drain placement (2-5%);  Post-operative voice change (permanent nerve injury is rare; < 2%)  Hypoparathyroidism (transient up to 30% and permanent ~2% after TOTAL thyroidectomy; very common after parathyroidectomy - requiring calcium supplements.  Failure to cure primary hyperparathyroidism with first operation - 2-5%    I explained to the patient that:   Operation is ~1 hour in duration  Hospital stay is typically 1 night  Recovery - most patients return to work within the same week    The patient asked relevant questions

## 2024-10-15 DIAGNOSIS — E21.3 HYPERPARATHYROIDISM (HCC): Primary | ICD-10-CM

## 2024-10-18 ENCOUNTER — TELEPHONE (OUTPATIENT)
Age: 69
End: 2024-10-18

## 2024-10-18 ENCOUNTER — HOSPITAL ENCOUNTER (OUTPATIENT)
Facility: HOSPITAL | Age: 69
Discharge: HOME | End: 2024-10-21
Payer: MEDICARE

## 2024-10-18 VITALS
SYSTOLIC BLOOD PRESSURE: 147 MMHG | TEMPERATURE: 97.8 F | HEART RATE: 60 BPM | OXYGEN SATURATION: 100 % | HEIGHT: 68 IN | WEIGHT: 176.59 LBS | DIASTOLIC BLOOD PRESSURE: 62 MMHG | BODY MASS INDEX: 26.76 KG/M2 | RESPIRATION RATE: 18 BRPM

## 2024-10-18 LAB
ANION GAP SERPL CALC-SCNC: 3 MMOL/L (ref 2–12)
BUN SERPL-MCNC: 15 MG/DL (ref 6–20)
BUN/CREAT SERPL: 27 (ref 12–20)
CALCIUM SERPL-MCNC: 10 MG/DL (ref 8.5–10.1)
CALCIUM SERPL-MCNC: 10.1 MG/DL (ref 8.5–10.1)
CHLORIDE SERPL-SCNC: 101 MMOL/L (ref 97–108)
CO2 SERPL-SCNC: 28 MMOL/L (ref 21–32)
CREAT SERPL-MCNC: 0.56 MG/DL (ref 0.55–1.02)
ERYTHROCYTE [DISTWIDTH] IN BLOOD BY AUTOMATED COUNT: 12 % (ref 11.5–14.5)
GLUCOSE SERPL-MCNC: 81 MG/DL (ref 65–100)
HCT VFR BLD AUTO: 40.3 % (ref 35–47)
HGB BLD-MCNC: 13.2 G/DL (ref 11.5–16)
MCH RBC QN AUTO: 30.9 PG (ref 26–34)
MCHC RBC AUTO-ENTMCNC: 32.8 G/DL (ref 30–36.5)
MCV RBC AUTO: 94.4 FL (ref 80–99)
NRBC # BLD: 0 K/UL (ref 0–0.01)
NRBC BLD-RTO: 0 PER 100 WBC
PLATELET # BLD AUTO: 283 K/UL (ref 150–400)
PMV BLD AUTO: 8 FL (ref 8.9–12.9)
POTASSIUM SERPL-SCNC: 4.7 MMOL/L (ref 3.5–5.1)
PTH-INTACT SERPL-MCNC: 55.3 PG/ML (ref 18.4–88)
RBC # BLD AUTO: 4.27 M/UL (ref 3.8–5.2)
SODIUM SERPL-SCNC: 132 MMOL/L (ref 136–145)
WBC # BLD AUTO: 3.9 K/UL (ref 3.6–11)

## 2024-10-18 PROCEDURE — 83970 ASSAY OF PARATHORMONE: CPT

## 2024-10-18 PROCEDURE — 80048 BASIC METABOLIC PNL TOTAL CA: CPT

## 2024-10-18 PROCEDURE — 85027 COMPLETE CBC AUTOMATED: CPT

## 2024-10-18 PROCEDURE — 36415 COLL VENOUS BLD VENIPUNCTURE: CPT

## 2024-10-18 RX ORDER — SODIUM CHLORIDE, SODIUM LACTATE, POTASSIUM CHLORIDE, CALCIUM CHLORIDE 600; 310; 30; 20 MG/100ML; MG/100ML; MG/100ML; MG/100ML
INJECTION, SOLUTION INTRAVENOUS CONTINUOUS
OUTPATIENT
Start: 2024-10-29

## 2024-10-18 ASSESSMENT — PAIN DESCRIPTION - LOCATION: LOCATION: HIP;KNEE

## 2024-10-18 ASSESSMENT — PAIN DESCRIPTION - ORIENTATION: ORIENTATION: RIGHT

## 2024-10-18 ASSESSMENT — PAIN SCALES - GENERAL: PAINLEVEL_OUTOF10: 5

## 2024-10-18 ASSESSMENT — PAIN DESCRIPTION - DESCRIPTORS: DESCRIPTORS: ACHING

## 2024-10-18 NOTE — PROGRESS NOTES
[facility]  Preoperative Instructions        Surgery Date 10/29/2024          Time of Arrival to be called @ [phone]     On the day of your surgery, please report to Surgical Services Registration Desk and sign in at your designated time.  The Surgery Center is located to the right of the Emergency Room.     2. You must have someone with you to drive you home. You should not drive a car for 24 hours following surgery. Please make arrangements for a friend or family member to stay with you for the first 24 hours after your surgery.    3. Do not have anything to eat or drink (including water, gum, mints, coffee, juice) after midnight ??      .?This may not apply to medications prescribed by your physician. ?(Please note below the special instructions with medications to take the morning of your procedure.)    4. We recommend you do not drink any alcoholic beverages for 24 hours before and after your surgery.    5. Contact your surgeon's office for instructions on the following medications: non-steroidal anti-inflammatory drugs (i.e. Advil, Aleve), vitamins, and supplements. (Some surgeon's will want you to stop these medications prior to surgery and others may allow you to take them)  **If you are currently taking Plavix, Coumadin, Aspirin and/or other blood-thinning agents, contact your surgeon for instructions.** Your surgeon will partner with the physician prescribing these medications to determine if it is safe to stop or if you need to continue taking.  Please do not stop taking these medications without instructions from your surgeon    6. Wear comfortable clothes.  Wear glasses instead of contacts.  Do not bring any money or jewelry. Please bring picture ID, insurance card, and any prearranged co-payment or hospital payment.  Do not wear make-up, particularly mascara the morning of your surgery.  Do not wear nail polish, particularly if you are having foot /hand surgery.  Wear your

## 2024-10-18 NOTE — PROGRESS NOTES
Faxed pre-op labs to Dr Owens for review and asked if patient needs pre-op cardiac clearance and pulmonary clearance as patient states she is not aware of this. Fax confirmed.     10/18/2024 1549 pm Spoke to Mirella at Dr Owens's office and Dr Owens has reviewed her labs and okay for surgery. Per Mirella, Dr Owens states that patient does not need cardiac or pulmonary clearance prior to her surgery. Called patient and informed her that she did not need these clearance per Dr Owens and she verbalizes understanding.

## 2024-10-18 NOTE — TELEPHONE ENCOUNTER
Naty from PAT called requesting to speak to nurse about preop pulmonary and cardiac clearance note from 10.14.24 in office patient states she was not aware, has surgery on 10.29.24 please advise asap     Naty

## 2024-10-18 NOTE — PROGRESS NOTES

## 2024-10-18 NOTE — TELEPHONE ENCOUNTER
Spoke with Dr Owens and he said patient does not need cardiac or pulmonary clearance and he has reviewed her labs and he is ok to proceed with surgery. Naty in PAT has been notified and she will call the patient

## 2024-10-29 ENCOUNTER — APPOINTMENT (OUTPATIENT)
Facility: HOSPITAL | Age: 69
End: 2024-10-29
Attending: SURGERY
Payer: MEDICARE

## 2024-10-29 ENCOUNTER — HOSPITAL ENCOUNTER (OUTPATIENT)
Facility: HOSPITAL | Age: 69
Setting detail: OBSERVATION
Discharge: HOME | End: 2024-10-30
Attending: SURGERY | Admitting: SURGERY
Payer: MEDICARE

## 2024-10-29 ENCOUNTER — ANESTHESIA (OUTPATIENT)
Facility: HOSPITAL | Age: 69
End: 2024-10-29
Payer: MEDICARE

## 2024-10-29 ENCOUNTER — ANESTHESIA EVENT (OUTPATIENT)
Facility: HOSPITAL | Age: 69
End: 2024-10-29
Payer: MEDICARE

## 2024-10-29 DIAGNOSIS — E21.3 HYPERPARATHYROIDISM (HCC): ICD-10-CM

## 2024-10-29 LAB — CALCIUM SERPL-MCNC: 9.4 MG/DL (ref 8.5–10.1)

## 2024-10-29 PROCEDURE — G0378 HOSPITAL OBSERVATION PER HR: HCPCS

## 2024-10-29 PROCEDURE — 6360000002 HC RX W HCPCS: Performed by: NURSE ANESTHETIST, CERTIFIED REGISTERED

## 2024-10-29 PROCEDURE — A9500 TC99M SESTAMIBI: HCPCS | Performed by: SURGERY

## 2024-10-29 PROCEDURE — 2580000003 HC RX 258: Performed by: SURGERY

## 2024-10-29 PROCEDURE — 6370000000 HC RX 637 (ALT 250 FOR IP): Performed by: NURSE PRACTITIONER

## 2024-10-29 PROCEDURE — 60500 EXPLORE PARATHYROID GLANDS: CPT | Performed by: SURGERY

## 2024-10-29 PROCEDURE — 3700000000 HC ANESTHESIA ATTENDED CARE: Performed by: SURGERY

## 2024-10-29 PROCEDURE — 7100000001 HC PACU RECOVERY - ADDTL 15 MIN: Performed by: SURGERY

## 2024-10-29 PROCEDURE — 3430000000 HC RX DIAGNOSTIC RADIOPHARMACEUTICAL: Performed by: SURGERY

## 2024-10-29 PROCEDURE — 2709999900 HC NON-CHARGEABLE SUPPLY: Performed by: SURGERY

## 2024-10-29 PROCEDURE — 2500000003 HC RX 250 WO HCPCS: Performed by: NURSE ANESTHETIST, CERTIFIED REGISTERED

## 2024-10-29 PROCEDURE — 6360000002 HC RX W HCPCS: Performed by: STUDENT IN AN ORGANIZED HEALTH CARE EDUCATION/TRAINING PROGRAM

## 2024-10-29 PROCEDURE — 2720000010 HC SURG SUPPLY STERILE: Performed by: SURGERY

## 2024-10-29 PROCEDURE — 78070 PARATHYROID PLANAR IMAGING: CPT

## 2024-10-29 PROCEDURE — 3600000004 HC SURGERY LEVEL 4 BASE: Performed by: SURGERY

## 2024-10-29 PROCEDURE — 88305 TISSUE EXAM BY PATHOLOGIST: CPT

## 2024-10-29 PROCEDURE — 7100000000 HC PACU RECOVERY - FIRST 15 MIN: Performed by: SURGERY

## 2024-10-29 PROCEDURE — 3600000014 HC SURGERY LEVEL 4 ADDTL 15MIN: Performed by: SURGERY

## 2024-10-29 PROCEDURE — 88331 PATH CONSLTJ SURG 1 BLK 1SPC: CPT

## 2024-10-29 PROCEDURE — 2580000003 HC RX 258: Performed by: ANESTHESIOLOGY

## 2024-10-29 PROCEDURE — 6360000002 HC RX W HCPCS: Performed by: SURGERY

## 2024-10-29 PROCEDURE — 82310 ASSAY OF CALCIUM: CPT

## 2024-10-29 PROCEDURE — 36415 COLL VENOUS BLD VENIPUNCTURE: CPT

## 2024-10-29 PROCEDURE — 2580000003 HC RX 258: Performed by: NURSE ANESTHETIST, CERTIFIED REGISTERED

## 2024-10-29 PROCEDURE — 3700000001 HC ADD 15 MINUTES (ANESTHESIA): Performed by: SURGERY

## 2024-10-29 RX ORDER — FENTANYL CITRATE 50 UG/ML
INJECTION, SOLUTION INTRAMUSCULAR; INTRAVENOUS
Status: DISCONTINUED | OUTPATIENT
Start: 2024-10-29 | End: 2024-10-29 | Stop reason: SDUPTHER

## 2024-10-29 RX ORDER — TETRAKIS(2-METHOXYISOBUTYLISOCYANIDE)COPPER(I) TETRAFLUOROBORATE 1 MG/ML
25.2 INJECTION, POWDER, LYOPHILIZED, FOR SOLUTION INTRAVENOUS
Status: COMPLETED | OUTPATIENT
Start: 2024-10-29 | End: 2024-10-29

## 2024-10-29 RX ORDER — OXYCODONE HYDROCHLORIDE 5 MG/1
5 TABLET ORAL EVERY 4 HOURS PRN
Status: DISCONTINUED | OUTPATIENT
Start: 2024-10-29 | End: 2024-10-30 | Stop reason: HOSPADM

## 2024-10-29 RX ORDER — FENTANYL CITRATE 50 UG/ML
INJECTION, SOLUTION INTRAMUSCULAR; INTRAVENOUS
Status: DISPENSED
Start: 2024-10-29 | End: 2024-10-30

## 2024-10-29 RX ORDER — ROCURONIUM BROMIDE 10 MG/ML
INJECTION, SOLUTION INTRAVENOUS
Status: DISCONTINUED | OUTPATIENT
Start: 2024-10-29 | End: 2024-10-29 | Stop reason: SDUPTHER

## 2024-10-29 RX ORDER — HYDROMORPHONE HYDROCHLORIDE 1 MG/ML
0.5 INJECTION, SOLUTION INTRAMUSCULAR; INTRAVENOUS; SUBCUTANEOUS
Status: DISCONTINUED | OUTPATIENT
Start: 2024-10-29 | End: 2024-10-30 | Stop reason: HOSPADM

## 2024-10-29 RX ORDER — SUCCINYLCHOLINE CHLORIDE 20 MG/ML
INJECTION INTRAMUSCULAR; INTRAVENOUS
Status: DISCONTINUED | OUTPATIENT
Start: 2024-10-29 | End: 2024-10-29 | Stop reason: SDUPTHER

## 2024-10-29 RX ORDER — GLYCOPYRROLATE 0.2 MG/ML
INJECTION INTRAMUSCULAR; INTRAVENOUS
Status: DISCONTINUED | OUTPATIENT
Start: 2024-10-29 | End: 2024-10-29 | Stop reason: SDUPTHER

## 2024-10-29 RX ORDER — PROCHLORPERAZINE EDISYLATE 5 MG/ML
5 INJECTION INTRAMUSCULAR; INTRAVENOUS
Status: COMPLETED | OUTPATIENT
Start: 2024-10-29 | End: 2024-10-29

## 2024-10-29 RX ORDER — MIDAZOLAM HYDROCHLORIDE 1 MG/ML
INJECTION, SOLUTION INTRAMUSCULAR; INTRAVENOUS
Status: DISCONTINUED | OUTPATIENT
Start: 2024-10-29 | End: 2024-10-29 | Stop reason: SDUPTHER

## 2024-10-29 RX ORDER — HYDRALAZINE HYDROCHLORIDE 20 MG/ML
10 INJECTION INTRAMUSCULAR; INTRAVENOUS EVERY 6 HOURS PRN
Status: DISCONTINUED | OUTPATIENT
Start: 2024-10-29 | End: 2024-10-30 | Stop reason: HOSPADM

## 2024-10-29 RX ORDER — SODIUM CHLORIDE 0.9 % (FLUSH) 0.9 %
5-40 SYRINGE (ML) INJECTION EVERY 12 HOURS SCHEDULED
Status: DISCONTINUED | OUTPATIENT
Start: 2024-10-29 | End: 2024-10-30 | Stop reason: HOSPADM

## 2024-10-29 RX ORDER — NALOXONE HYDROCHLORIDE 0.4 MG/ML
INJECTION, SOLUTION INTRAMUSCULAR; INTRAVENOUS; SUBCUTANEOUS PRN
Status: DISCONTINUED | OUTPATIENT
Start: 2024-10-29 | End: 2024-10-29 | Stop reason: HOSPADM

## 2024-10-29 RX ORDER — SODIUM CHLORIDE 0.9 % (FLUSH) 0.9 %
5-40 SYRINGE (ML) INJECTION PRN
Status: DISCONTINUED | OUTPATIENT
Start: 2024-10-29 | End: 2024-10-30 | Stop reason: HOSPADM

## 2024-10-29 RX ORDER — SODIUM CHLORIDE 9 MG/ML
INJECTION, SOLUTION INTRAVENOUS PRN
Status: DISCONTINUED | OUTPATIENT
Start: 2024-10-29 | End: 2024-10-29 | Stop reason: HOSPADM

## 2024-10-29 RX ORDER — SODIUM CHLORIDE 0.9 % (FLUSH) 0.9 %
5-40 SYRINGE (ML) INJECTION EVERY 12 HOURS SCHEDULED
Status: DISCONTINUED | OUTPATIENT
Start: 2024-10-29 | End: 2024-10-29 | Stop reason: HOSPADM

## 2024-10-29 RX ORDER — DEXTROSE MONOHYDRATE 100 MG/ML
INJECTION, SOLUTION INTRAVENOUS CONTINUOUS PRN
Status: DISCONTINUED | OUTPATIENT
Start: 2024-10-29 | End: 2024-10-29 | Stop reason: HOSPADM

## 2024-10-29 RX ORDER — FENTANYL CITRATE 50 UG/ML
25 INJECTION, SOLUTION INTRAMUSCULAR; INTRAVENOUS EVERY 5 MIN PRN
Status: DISCONTINUED | OUTPATIENT
Start: 2024-10-29 | End: 2024-10-29 | Stop reason: HOSPADM

## 2024-10-29 RX ORDER — EPHEDRINE SULFATE/0.9% NACL/PF 25 MG/5 ML
SYRINGE (ML) INTRAVENOUS
Status: DISCONTINUED | OUTPATIENT
Start: 2024-10-29 | End: 2024-10-29 | Stop reason: SDUPTHER

## 2024-10-29 RX ORDER — VALSARTAN 160 MG/1
320 TABLET ORAL DAILY
Status: DISCONTINUED | OUTPATIENT
Start: 2024-10-30 | End: 2024-10-30 | Stop reason: HOSPADM

## 2024-10-29 RX ORDER — IPRATROPIUM BROMIDE AND ALBUTEROL SULFATE 2.5; .5 MG/3ML; MG/3ML
1 SOLUTION RESPIRATORY (INHALATION)
Status: DISCONTINUED | OUTPATIENT
Start: 2024-10-29 | End: 2024-10-29 | Stop reason: HOSPADM

## 2024-10-29 RX ORDER — ONDANSETRON 2 MG/ML
INJECTION INTRAMUSCULAR; INTRAVENOUS
Status: DISCONTINUED | OUTPATIENT
Start: 2024-10-29 | End: 2024-10-29 | Stop reason: SDUPTHER

## 2024-10-29 RX ORDER — ONDANSETRON 2 MG/ML
4 INJECTION INTRAMUSCULAR; INTRAVENOUS EVERY 6 HOURS PRN
Status: DISCONTINUED | OUTPATIENT
Start: 2024-10-29 | End: 2024-10-30 | Stop reason: HOSPADM

## 2024-10-29 RX ORDER — SODIUM CHLORIDE 0.9 % (FLUSH) 0.9 %
5-40 SYRINGE (ML) INJECTION PRN
Status: DISCONTINUED | OUTPATIENT
Start: 2024-10-29 | End: 2024-10-29 | Stop reason: HOSPADM

## 2024-10-29 RX ORDER — SODIUM CHLORIDE 9 MG/ML
INJECTION, SOLUTION INTRAVENOUS PRN
Status: DISCONTINUED | OUTPATIENT
Start: 2024-10-29 | End: 2024-10-30 | Stop reason: HOSPADM

## 2024-10-29 RX ORDER — ACETAMINOPHEN 325 MG/1
650 TABLET ORAL EVERY 6 HOURS SCHEDULED
Status: DISCONTINUED | OUTPATIENT
Start: 2024-10-29 | End: 2024-10-30 | Stop reason: HOSPADM

## 2024-10-29 RX ORDER — DEXAMETHASONE SODIUM PHOSPHATE 4 MG/ML
INJECTION, SOLUTION INTRA-ARTICULAR; INTRALESIONAL; INTRAMUSCULAR; INTRAVENOUS; SOFT TISSUE
Status: DISCONTINUED | OUTPATIENT
Start: 2024-10-29 | End: 2024-10-29 | Stop reason: SDUPTHER

## 2024-10-29 RX ORDER — HYDROMORPHONE HYDROCHLORIDE 1 MG/ML
0.5 INJECTION, SOLUTION INTRAMUSCULAR; INTRAVENOUS; SUBCUTANEOUS EVERY 5 MIN PRN
Status: DISCONTINUED | OUTPATIENT
Start: 2024-10-29 | End: 2024-10-29 | Stop reason: HOSPADM

## 2024-10-29 RX ORDER — SULFASALAZINE 500 MG/1
1000 TABLET ORAL 2 TIMES DAILY
Status: DISCONTINUED | OUTPATIENT
Start: 2024-10-30 | End: 2024-10-30 | Stop reason: HOSPADM

## 2024-10-29 RX ORDER — CALCIUM CITRATE/VITAMIN D3 200MG-6.25
1 TABLET ORAL
Status: DISCONTINUED | OUTPATIENT
Start: 2024-10-29 | End: 2024-10-30 | Stop reason: HOSPADM

## 2024-10-29 RX ORDER — AMLODIPINE BESYLATE 5 MG/1
5 TABLET ORAL DAILY
Status: DISCONTINUED | OUTPATIENT
Start: 2024-10-30 | End: 2024-10-30 | Stop reason: HOSPADM

## 2024-10-29 RX ORDER — GLUCAGON 1 MG/ML
1 KIT INJECTION PRN
Status: DISCONTINUED | OUTPATIENT
Start: 2024-10-29 | End: 2024-10-29 | Stop reason: HOSPADM

## 2024-10-29 RX ORDER — ONDANSETRON 2 MG/ML
4 INJECTION INTRAMUSCULAR; INTRAVENOUS
Status: DISCONTINUED | OUTPATIENT
Start: 2024-10-29 | End: 2024-10-29 | Stop reason: HOSPADM

## 2024-10-29 RX ORDER — PHENYLEPHRINE HCL IN 0.9% NACL 0.4MG/10ML
SYRINGE (ML) INTRAVENOUS
Status: DISCONTINUED | OUTPATIENT
Start: 2024-10-29 | End: 2024-10-29 | Stop reason: SDUPTHER

## 2024-10-29 RX ORDER — SODIUM CHLORIDE, SODIUM LACTATE, POTASSIUM CHLORIDE, CALCIUM CHLORIDE 600; 310; 30; 20 MG/100ML; MG/100ML; MG/100ML; MG/100ML
INJECTION, SOLUTION INTRAVENOUS CONTINUOUS
Status: DISCONTINUED | OUTPATIENT
Start: 2024-10-29 | End: 2024-10-29 | Stop reason: HOSPADM

## 2024-10-29 RX ORDER — ONDANSETRON 4 MG/1
4 TABLET, ORALLY DISINTEGRATING ORAL EVERY 8 HOURS PRN
Status: DISCONTINUED | OUTPATIENT
Start: 2024-10-29 | End: 2024-10-30 | Stop reason: HOSPADM

## 2024-10-29 RX ADMIN — EPHEDRINE SULFATE 10 MG: 5 INJECTION INTRAVENOUS at 10:57

## 2024-10-29 RX ADMIN — ACETAMINOPHEN 650 MG: 325 TABLET ORAL at 18:22

## 2024-10-29 RX ADMIN — MIDAZOLAM HYDROCHLORIDE 2 MG: 1 INJECTION, SOLUTION INTRAMUSCULAR; INTRAVENOUS at 10:36

## 2024-10-29 RX ADMIN — PROCHLORPERAZINE EDISYLATE 5 MG: 5 INJECTION INTRAMUSCULAR; INTRAVENOUS at 13:01

## 2024-10-29 RX ADMIN — GLYCOPYRROLATE 0.2 MG: 0.2 INJECTION, SOLUTION INTRAMUSCULAR; INTRAVENOUS at 10:50

## 2024-10-29 RX ADMIN — Medication 80 MCG: at 10:52

## 2024-10-29 RX ADMIN — TETRAKIS(2-METHOXYISOBUTYLISOCYANIDE)COPPER(I) TETRAFLUOROBORATE 25.2 MILLICURIE: 1 INJECTION, POWDER, LYOPHILIZED, FOR SOLUTION INTRAVENOUS at 09:00

## 2024-10-29 RX ADMIN — PHENYLEPHRINE HYDROCHLORIDE 40 MCG/MIN: 10 INJECTION INTRAVENOUS at 10:50

## 2024-10-29 RX ADMIN — EPHEDRINE SULFATE 10 MG: 5 INJECTION INTRAVENOUS at 10:54

## 2024-10-29 RX ADMIN — HYDROMORPHONE HYDROCHLORIDE 0.5 MG: 1 INJECTION, SOLUTION INTRAMUSCULAR; INTRAVENOUS; SUBCUTANEOUS at 10:44

## 2024-10-29 RX ADMIN — LIDOCAINE HYDROCHLORIDE 60 MG: 20 INJECTION, SOLUTION EPIDURAL; INFILTRATION; INTRACAUDAL at 10:42

## 2024-10-29 RX ADMIN — SODIUM CHLORIDE, POTASSIUM CHLORIDE, SODIUM LACTATE AND CALCIUM CHLORIDE: 600; 310; 30; 20 INJECTION, SOLUTION INTRAVENOUS at 09:43

## 2024-10-29 RX ADMIN — HYDROMORPHONE HYDROCHLORIDE 0.5 MG: 1 INJECTION, SOLUTION INTRAMUSCULAR; INTRAVENOUS; SUBCUTANEOUS at 10:59

## 2024-10-29 RX ADMIN — WATER 2000 MG: 1 INJECTION INTRAMUSCULAR; INTRAVENOUS; SUBCUTANEOUS at 10:51

## 2024-10-29 RX ADMIN — SUCCINYLCHOLINE CHLORIDE 140 MG: 20 INJECTION, SOLUTION INTRAMUSCULAR; INTRAVENOUS at 10:42

## 2024-10-29 RX ADMIN — PROPOFOL 150 MG: 10 INJECTION, EMULSION INTRAVENOUS at 10:42

## 2024-10-29 RX ADMIN — FENTANYL CITRATE 50 MCG: 50 INJECTION, SOLUTION INTRAMUSCULAR; INTRAVENOUS at 12:13

## 2024-10-29 RX ADMIN — ONDANSETRON HYDROCHLORIDE 4 MG: 2 INJECTION, SOLUTION INTRAMUSCULAR; INTRAVENOUS at 11:05

## 2024-10-29 RX ADMIN — SODIUM CHLORIDE, PRESERVATIVE FREE 5 ML: 5 INJECTION INTRAVENOUS at 20:57

## 2024-10-29 RX ADMIN — FENTANYL CITRATE 50 MCG: 50 INJECTION, SOLUTION INTRAMUSCULAR; INTRAVENOUS at 12:10

## 2024-10-29 RX ADMIN — ROCURONIUM BROMIDE 5 MG: 10 INJECTION INTRAVENOUS at 10:42

## 2024-10-29 RX ADMIN — DEXAMETHASONE SODIUM PHOSPHATE 8 MG: 4 INJECTION, SOLUTION INTRAMUSCULAR; INTRAVENOUS at 11:10

## 2024-10-29 RX ADMIN — PROPOFOL 50 MG: 10 INJECTION, EMULSION INTRAVENOUS at 10:59

## 2024-10-29 RX ADMIN — FENTANYL CITRATE 100 MCG: 50 INJECTION, SOLUTION INTRAMUSCULAR; INTRAVENOUS at 10:42

## 2024-10-29 ASSESSMENT — PAIN SCALES - GENERAL
PAINLEVEL_OUTOF10: 3
PAINLEVEL_OUTOF10: 7
PAINLEVEL_OUTOF10: 3

## 2024-10-29 ASSESSMENT — PAIN SCALES - WONG BAKER
WONGBAKER_NUMERICALRESPONSE: HURTS A LITTLE BIT

## 2024-10-29 ASSESSMENT — PAIN DESCRIPTION - PAIN TYPE: TYPE: ACUTE PAIN

## 2024-10-29 ASSESSMENT — PAIN DESCRIPTION - ORIENTATION
ORIENTATION: MID
ORIENTATION: MID

## 2024-10-29 ASSESSMENT — PAIN DESCRIPTION - FREQUENCY: FREQUENCY: INTERMITTENT

## 2024-10-29 ASSESSMENT — PAIN DESCRIPTION - LOCATION
LOCATION: HEAD
LOCATION: HEAD

## 2024-10-29 ASSESSMENT — PAIN - FUNCTIONAL ASSESSMENT: PAIN_FUNCTIONAL_ASSESSMENT: ACTIVITIES ARE NOT PREVENTED

## 2024-10-29 ASSESSMENT — PAIN DESCRIPTION - DESCRIPTORS
DESCRIPTORS: ACHING
DESCRIPTORS: ACHING

## 2024-10-29 NOTE — CARE COORDINATION
Transition of Care Plan:    RUR: OBS  Prior Level of Functioning:   Disposition: Home w/family  GITA:   If SNF or IPR: Date FOC offered:   Date FOC received:   Accepting facility:   Date authorization started with reference number:   Date authorization received and expires:   Follow up appointments:   DME needed: n/a  Transportation at discharge:   IM/IMM Medicare/ letter given: n/a  Is patient a [facility] and [facility] with [facility]?    If yes, was [facility] transfer form completed and [facility] notified?   Caregiver Contact:   Discharge Caregiver contacted prior to discharge?   Care Conference needed?   Barriers to discharge:     OBS letter signed, placed on chart & copy given to pt.   is at bedside.    Haydee Paulino  Ext [phone]

## 2024-10-29 NOTE — OP NOTE
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  The left lower and right upper parathyroid glands were enlarged. Technetium 99 count is as noted below.  The thyroid was unremarkable.  There was no lymphadenopathy. Remaining neck structures were normal. Superior and recurrent laryngeal nerves were kept intact.    Anesthesia: General          Drains: none           Specimens:   ID Type Source Tests Collected by Time Destination   1 : left lobe parathyroid candidate  44  Normocellular on frozen section Tissue Thyroid SURGICAL PATHOLOGY Fei Owens MD 10/29/2024 1113    2 : Right upper parathyroid  89  Hypercellular on frozen section Tissue Thyroid SURGICAL PATHOLOGY Fei Owens MD 10/29/2024 1126    3 : Right lower parathyroid biopsy  21 Tissue Thyroid SURGICAL PATHOLOGY Fei Owens MD 10/29/2024 1142    4 : left upper parathyroid biopsy  44 Tissue Thyroid SURGICAL PATHOLOGY Fei Owens MD 10/29/2024 1142                Implants: * No implants in log *    Complications:  None; patient tolerated the procedure well.           Condition: Stable    Procedure in Detail:    The patient was seen in the Holding Room. The risks, benefits, complications, treatment options, and expected outcomes were discussed with the patient. The patient concurred with the proposed plan, giving informed consent.  The patient was taken to Operating Room # 5, identified as Terri Boswell and the procedure verified as radio guided mini parathyroidectomy. A Time Out was held and the

## 2024-10-29 NOTE — INTERVAL H&P NOTE
Update History & Physical    The Patient's History and Physical attached was reviewed with the patient and I examined the patient.  There is no change.  The surgical site was confirmed by the patient and me.    Plan:  The risk, benefits, expected outcome, and alternative to the recommended procedure have been discussed with Terri Boswell.  She understands and wants to proceed with the procedure.

## 2024-10-29 NOTE — PERIOP NOTE
0814:  Patient ambulatory to OR Holding area.  She is alert, oriented & very pleasant.  Consets reviewed with her.  She verbalizes understanding of all info provided.  Signature obtained for anesthesia.      0820:  Patient up to BR to empty bladder & change into surgical gown.    0849:  Patient off unit to Nuclear Med via wheelchair & PCT.    0927:  Patient back to OR Holding area.    0942:  Dr. Laird in to see the patient & discuss anesthesia plan of care.    0949:  Dr. Owens in to see the patient & discuss surgical plan of care.

## 2024-10-29 NOTE — PROGRESS NOTES
End of Shift Note    Bedside shift change report given to REBEKAH Duncan (oncoming nurse) by Rosa Maria Pillai LPN (offgoing nurse).  Report included the following information SBAR    Shift worked:  7a-7p     Shift summary and any significant changes:    Pt arrived on unit, made comfortable. VSS. Pt ambulating in halls and up adlib to the restroom, voiding well. Pt tolerating fluids and foods. Calcium lab drawn, Tylenol ordered for pain. Uneventful evening.      Concerns for physician to address:  None.       Zone phone for oncoming shift:   [phone]         Rosa Maria Pillai LPN

## 2024-10-30 VITALS
SYSTOLIC BLOOD PRESSURE: 157 MMHG | HEIGHT: 68 IN | BODY MASS INDEX: 26.73 KG/M2 | WEIGHT: 176.37 LBS | OXYGEN SATURATION: 98 % | HEART RATE: 65 BPM | RESPIRATION RATE: 17 BRPM | TEMPERATURE: 97.7 F | DIASTOLIC BLOOD PRESSURE: 66 MMHG

## 2024-10-30 LAB
ANION GAP SERPL CALC-SCNC: 5 MMOL/L (ref 2–12)
BUN SERPL-MCNC: 16 MG/DL (ref 6–20)
BUN/CREAT SERPL: 29 (ref 12–20)
CALCIUM SERPL-MCNC: 8.5 MG/DL (ref 8.5–10.1)
CALCIUM SERPL-MCNC: 8.9 MG/DL (ref 8.5–10.1)
CALCIUM SERPL-MCNC: 9.1 MG/DL (ref 8.5–10.1)
CHLORIDE SERPL-SCNC: 97 MMOL/L (ref 97–108)
CO2 SERPL-SCNC: 26 MMOL/L (ref 21–32)
CREAT SERPL-MCNC: 0.55 MG/DL (ref 0.55–1.02)
GLUCOSE SERPL-MCNC: 96 MG/DL (ref 65–100)
POTASSIUM SERPL-SCNC: 4 MMOL/L (ref 3.5–5.1)
SODIUM SERPL-SCNC: 128 MMOL/L (ref 136–145)

## 2024-10-30 PROCEDURE — 80048 BASIC METABOLIC PNL TOTAL CA: CPT

## 2024-10-30 PROCEDURE — 6370000000 HC RX 637 (ALT 250 FOR IP): Performed by: SURGERY

## 2024-10-30 PROCEDURE — 6370000000 HC RX 637 (ALT 250 FOR IP): Performed by: NURSE PRACTITIONER

## 2024-10-30 PROCEDURE — 36415 COLL VENOUS BLD VENIPUNCTURE: CPT

## 2024-10-30 PROCEDURE — 2580000003 HC RX 258: Performed by: SURGERY

## 2024-10-30 PROCEDURE — 82310 ASSAY OF CALCIUM: CPT

## 2024-10-30 PROCEDURE — G0378 HOSPITAL OBSERVATION PER HR: HCPCS

## 2024-10-30 RX ORDER — OXYCODONE HYDROCHLORIDE 5 MG/1
5 TABLET ORAL EVERY 6 HOURS PRN
Qty: 10 TABLET | Refills: 0 | Status: SHIPPED | OUTPATIENT
Start: 2024-10-30 | End: 2024-11-02

## 2024-10-30 RX ORDER — CALCIUM CITRATE/VITAMIN D3 200MG-6.25
2 TABLET ORAL 2 TIMES DAILY
Qty: 120 TABLET | Refills: 0 | Status: SHIPPED | OUTPATIENT
Start: 2024-10-30 | End: 2024-11-29

## 2024-10-30 RX ADMIN — AMLODIPINE BESYLATE 5 MG: 5 TABLET ORAL at 09:48

## 2024-10-30 RX ADMIN — VALSARTAN 320 MG: 160 TABLET, FILM COATED ORAL at 09:48

## 2024-10-30 RX ADMIN — ACETAMINOPHEN 650 MG: 325 TABLET ORAL at 00:20

## 2024-10-30 RX ADMIN — SULFASALAZINE 1000 MG: 500 TABLET ORAL at 09:48

## 2024-10-30 RX ADMIN — SODIUM CHLORIDE, PRESERVATIVE FREE 10 ML: 5 INJECTION INTRAVENOUS at 09:50

## 2024-10-30 ASSESSMENT — PAIN SCALES - GENERAL
PAINLEVEL_OUTOF10: 0
PAINLEVEL_OUTOF10: 4

## 2024-10-30 ASSESSMENT — PAIN DESCRIPTION - ORIENTATION: ORIENTATION: ANTERIOR

## 2024-10-30 ASSESSMENT — PAIN DESCRIPTION - LOCATION: LOCATION: HEAD;NECK

## 2024-10-30 ASSESSMENT — PAIN DESCRIPTION - DESCRIPTORS: DESCRIPTORS: ACHING;SORE

## 2024-10-30 NOTE — ANESTHESIA POSTPROCEDURE EVALUATION
Department of Anesthesiology  Postprocedure Note    Patient: Terri Boswell  MRN: [id number]  YOB: 1955  Date of evaluation: 10/30/2024    Procedure Summary       Date: 10/29/24 Room / Location: [facility] MAIN OR  / [facility] MAIN OR    Anesthesia Start: 1036 Anesthesia Stop: 1213    Procedure: RADIO GUIDED PARATHYROIDECTOMY (Neck) Diagnosis:       Hyperparathyroidism (HCC)      (Hyperparathyroidism (HCC) [E21.3])    Providers: Fei Owens MD Responsible Provider: Frank Laird MD    Anesthesia Type: general ASA Status: 2            Anesthesia Type: No value filed.    Noni Phase I: Noni Score: 9    Noni Phase II:      Anesthesia Post Evaluation    Patient location during evaluation: PACU  Patient participation: complete - patient participated  Level of consciousness: awake and alert  Airway patency: patent  Nausea & Vomiting: no nausea  Cardiovascular status: hemodynamically stable  Respiratory status: acceptable  Hydration status: euvolemic  Multimodal analgesia pain management approach  Pain management: adequate        No notable events documented.

## 2024-10-30 NOTE — PROGRESS NOTES
End of Shift Note    Bedside shift change report given to Rosa Maria GERARDO (oncoming nurse) by JAQUELINE Borrego RN (offgoing nurse).  Report included the following information SBAR and MAR    Shift worked:  7pm-7am     Shift summary and any significant changes:     Pt a/o x4,taking her pills whole.calcium check Q6hr am 8.5 sodium 128. Dressing clean and intact, no redness or swelling able to swallow. Ambulating in the room independently.     Concerns for physician to address:  none     Zone phone for oncoming shift:                 JAQUELINE Borrego, RN

## 2024-10-30 NOTE — DISCHARGE INSTRUCTIONS
Dr. Owens's Discharge Instructions for:  Terri Boswell    MRN: [id number] : 1955    Admitted: 10/29/2024  Discharged: 10/30/2024     Take Home Medications   Resume you home medications as instructed.  Start taking Citracal (calcium + vit D) using the dose schedule below.  This is very important.  If you do not take the Citracal, you blood calcium will drop.  Low blood calcium is dangerous.  Citracal dosing schedule (Citracal or Citracal Gummies):       4 calcium pills per day for one week (5 if your calcium is over 12; 6 pills if your calcium is over 13)    After one week start taking 3 calcium pills per day until you office appointment. (4 if your calcium was over 12)     The Symptoms of LOW calcium are:   Tingling around your mouth, lips or finger tips.   Cramps in your hands or feet.   Feeling poor, confused, and \"in a fog\".    If you have symptoms of low calcium:     - Take 2 extra calcium pills right away   - After two hours, if you still have symptoms, take 2 more   - If you still have symptoms, take two pills every two hour until you have taken 10 extra pills.   - If you are still having symptoms, please call or go to to emergency room and have them check your calcium.  You may need IV calcium.              What to do at Home    Recommended diet: Resume your normal diet, try adding foods rich in Calcium    Recommended activity: activity as tolerated    Follow-up with Dr. Owens in 2-3 weeks.  Call [phone] for an appointment.      Parathyroidectomy: What to Expect at Home  Your Recovery  Parathyroidectomy is the removal of one or more of the four parathyroid glands in the neck. These small glands, located on the thyroid gland, help control the amount of calcium in the body. When they are too active, these glands cause high levels of calcium. This is called hyperparathyroidism (say \"hy-per-pair-fg-TNM-omgy-iz-um\"). The glands also are removed if they contain cancer.  You may leave the hospital

## 2024-10-30 NOTE — PROGRESS NOTES
Surgery NP Progress Note    Terri Boswell  [id number]  female  69 y.o.  1955    s/p RADIO GUIDED PARATHYROIDECTOMY on 10/29/2024    Pt seen with Dr. Owens    Assessment:   Principal Problem:    Hyperparathyroidism (HCC)  Resolved Problems:    * No resolved hospital problems. *    Expected post-op progress.     Plan/Recommendations/Medical Decision Making:     - Mobilize with nursing and OOB to chair for meals  - Continue diet  - Pain management- Continue current pain control methods.   - D/C home on Citracal.     Subjective:     Patient reports some soreness with swallowing but nothing getting caught. No numbness or tingling.     Objective:     Blood pressure (!) 157/66, pulse 65, temperature 97.7 °F (36.5 °C), temperature source Oral, resp. rate 17, height 1.727 m (5' 8\"), weight 80 kg (176 lb 5.9 oz), SpO2 98%.    Temp (24hrs), Av.6 °F (36.4 °C), Min:97.3 °F (36.3 °C), Max:97.9 °F (36.6 °C)      Pt resting in bed NAD   Incisions CDI.    SCDs for mechanical DVT proph while in bed     Body mass index is 26.82 kg/m².     Reference: BMI greater than 30 is classified as obesity and greater than 40 is classified as morbid obesity.       YESSI Christopher - NP   MSN, APRN, FNP-C, CWOCN-AP, RNAS-C    10/30/24

## 2024-10-31 ENCOUNTER — TELEPHONE (OUTPATIENT)
Age: 69
End: 2024-10-31

## 2024-10-31 NOTE — TELEPHONE ENCOUNTER
Patient states pharmacy has filled prescription for too high a strength was supposed to be 200 mg and 6.25 vitamin D and was filled for 630 mg 12.4 vitamin D requesting to speak to nurse please c/b requesting clarification    [phone]

## 2024-11-04 LAB — CALCIUM SERPL-MCNC: 8.3 MG/DL (ref 8.5–10.1)

## 2024-11-13 ENCOUNTER — OFFICE VISIT (OUTPATIENT)
Age: 69
End: 2024-11-13

## 2024-11-13 VITALS
BODY MASS INDEX: 27.16 KG/M2 | DIASTOLIC BLOOD PRESSURE: 68 MMHG | RESPIRATION RATE: 16 BRPM | TEMPERATURE: 97.8 F | OXYGEN SATURATION: 96 % | SYSTOLIC BLOOD PRESSURE: 154 MMHG | HEIGHT: 68 IN | WEIGHT: 179.2 LBS | HEART RATE: 63 BPM

## 2024-11-13 DIAGNOSIS — E21.3 HYPERPARATHYROIDISM (HCC): Primary | ICD-10-CM

## 2024-11-13 PROCEDURE — 99024 POSTOP FOLLOW-UP VISIT: CPT | Performed by: SURGERY

## 2024-11-13 ASSESSMENT — PATIENT HEALTH QUESTIONNAIRE - PHQ9
SUM OF ALL RESPONSES TO PHQ QUESTIONS 1-9: 0
SUM OF ALL RESPONSES TO PHQ9 QUESTIONS 1 & 2: 0
SUM OF ALL RESPONSES TO PHQ QUESTIONS 1-9: 0
1. LITTLE INTEREST OR PLEASURE IN DOING THINGS: NOT AT ALL
SUM OF ALL RESPONSES TO PHQ QUESTIONS 1-9: 0
2. FEELING DOWN, DEPRESSED OR HOPELESS: NOT AT ALL
SUM OF ALL RESPONSES TO PHQ QUESTIONS 1-9: 0

## 2024-11-13 NOTE — PROGRESS NOTES
Identified pt with two pt identifiers (name and ). Reviewed chart in preparation for visit and have obtained necessary documentation.    Terri Boswell is a 69 y.o. female Follow-up (S/P Radio-guided Mini-Parathyroidectomy on 10/29/24.)  .    Vitals:    24 0856   BP: (!) 154/68   Site: Left Upper Arm   Position: Sitting   Pulse: 63   Resp: 16   Temp: 97.8 °F (36.6 °C)   TempSrc: Oral   SpO2: 96%   Weight: 81.3 kg (179 lb 3.2 oz)   Height: 1.727 m (5' 8\")          1. Have you been to the ER, urgent care clinic since your last visit?  Hospitalized since your last visit?  no     2. Have you seen or consulted any other health care providers outside of the [facility] since your last visit?  Include any pap smears or colon screening.  no

## 2024-11-13 NOTE — PROGRESS NOTES
Chief Complaint   Patient presents with    Follow-up     S/P Radio-guided Mini-Parathyroidectomy on 10/29/24.     Reviewed pathology and provided a copy: Enlarged parathyroid glands removed: Right upper (hypercellular) and left lower (normocellular). Remaining 2 parathyroids biopsied and noted to be normal    Tolerating PO  Voice normal    Pain controlled without pain meds      Physical Exam:     Wound: clean, dry, no drainage    Doing well    Continue unrestricted activity.  Try stopping the Calcium supplements, resume and call the office if any symptoms develop    Confirm cure with repeat Ca and PTH in 2 weeks    Orders Placed This Encounter    PTH, Intact     Standing Status:   Future     Standing Expiration Date:   11/13/2025    Calcium     Standing Status:   Future     Standing Expiration Date:   11/13/2025     Will f/u on the labs    Office follow-up: radha Owens MD FACS

## 2024-11-25 ENCOUNTER — TELEPHONE (OUTPATIENT)
Age: 69
End: 2024-11-25

## 2024-11-26 DIAGNOSIS — E21.3 HYPERPARATHYROIDISM (HCC): ICD-10-CM

## 2024-11-26 LAB
CALCIUM SERPL-MCNC: 9.5 MG/DL (ref 8.5–10.1)
CALCIUM SERPL-MCNC: 9.6 MG/DL (ref 8.5–10.1)
PTH-INTACT SERPL-MCNC: 50.6 PG/ML (ref 18.4–88)

## 2024-12-02 ENCOUNTER — TELEPHONE (OUTPATIENT)
Age: 69
End: 2024-12-02

## 2024-12-02 NOTE — TELEPHONE ENCOUNTER
Spoke with patient and let her know per Dr Owens her labs are normal and she does not have to take her Citracal. She said she has not taken it since her appt with him.

## 2025-01-03 DIAGNOSIS — I10 ESSENTIAL (PRIMARY) HYPERTENSION: ICD-10-CM

## 2025-01-03 RX ORDER — VALSARTAN 320 MG/1
320 TABLET ORAL DAILY
Qty: 90 TABLET | Refills: 0 | Status: SHIPPED | OUTPATIENT
Start: 2025-01-03

## 2025-03-29 SDOH — ECONOMIC STABILITY: FOOD INSECURITY: WITHIN THE PAST 12 MONTHS, YOU WORRIED THAT YOUR FOOD WOULD RUN OUT BEFORE YOU GOT MONEY TO BUY MORE.: NEVER TRUE

## 2025-03-29 SDOH — ECONOMIC STABILITY: INCOME INSECURITY: IN THE LAST 12 MONTHS, WAS THERE A TIME WHEN YOU WERE NOT ABLE TO PAY THE MORTGAGE OR RENT ON TIME?: NO

## 2025-03-29 SDOH — ECONOMIC STABILITY: TRANSPORTATION INSECURITY
IN THE PAST 12 MONTHS, HAS LACK OF TRANSPORTATION KEPT YOU FROM MEETINGS, WORK, OR FROM GETTING THINGS NEEDED FOR DAILY LIVING?: NO

## 2025-03-29 SDOH — ECONOMIC STABILITY: FOOD INSECURITY: WITHIN THE PAST 12 MONTHS, THE FOOD YOU BOUGHT JUST DIDN'T LAST AND YOU DIDN'T HAVE MONEY TO GET MORE.: NEVER TRUE

## 2025-03-29 SDOH — ECONOMIC STABILITY: TRANSPORTATION INSECURITY
IN THE PAST 12 MONTHS, HAS THE LACK OF TRANSPORTATION KEPT YOU FROM MEDICAL APPOINTMENTS OR FROM GETTING MEDICATIONS?: NO

## 2025-04-01 ENCOUNTER — OFFICE VISIT (OUTPATIENT)
Age: 70
End: 2025-04-01
Payer: MEDICARE

## 2025-04-01 VITALS
SYSTOLIC BLOOD PRESSURE: 136 MMHG | DIASTOLIC BLOOD PRESSURE: 80 MMHG | HEART RATE: 62 BPM | TEMPERATURE: 97.8 F | BODY MASS INDEX: 26.1 KG/M2 | WEIGHT: 172.2 LBS | HEIGHT: 68 IN | OXYGEN SATURATION: 100 % | RESPIRATION RATE: 20 BRPM

## 2025-04-01 DIAGNOSIS — M06.9 RHEUMATOID ARTHRITIS, INVOLVING UNSPECIFIED SITE, UNSPECIFIED WHETHER RHEUMATOID FACTOR PRESENT (HCC): ICD-10-CM

## 2025-04-01 DIAGNOSIS — E21.3 HYPERPARATHYROIDISM: Primary | ICD-10-CM

## 2025-04-01 DIAGNOSIS — I10 ESSENTIAL (PRIMARY) HYPERTENSION: ICD-10-CM

## 2025-04-01 DIAGNOSIS — Z86.39 HISTORY OF HIGH CHOLESTEROL: ICD-10-CM

## 2025-04-01 DIAGNOSIS — E78.00 HIGH CHOLESTEROL: ICD-10-CM

## 2025-04-01 PROBLEM — C43.59 MELANOMA OF THORACIC REGION (HCC): Status: RESOLVED | Noted: 2022-05-25 | Resolved: 2025-04-01

## 2025-04-01 PROCEDURE — G8427 DOCREV CUR MEDS BY ELIG CLIN: HCPCS | Performed by: INTERNAL MEDICINE

## 2025-04-01 PROCEDURE — 1036F TOBACCO NON-USER: CPT | Performed by: INTERNAL MEDICINE

## 2025-04-01 PROCEDURE — 3075F SYST BP GE 130 - 139MM HG: CPT | Performed by: INTERNAL MEDICINE

## 2025-04-01 PROCEDURE — 99214 OFFICE O/P EST MOD 30 MIN: CPT | Performed by: INTERNAL MEDICINE

## 2025-04-01 PROCEDURE — 1159F MED LIST DOCD IN RCRD: CPT | Performed by: INTERNAL MEDICINE

## 2025-04-01 PROCEDURE — 1090F PRES/ABSN URINE INCON ASSESS: CPT | Performed by: INTERNAL MEDICINE

## 2025-04-01 PROCEDURE — 3078F DIAST BP <80 MM HG: CPT | Performed by: INTERNAL MEDICINE

## 2025-04-01 PROCEDURE — G8419 CALC BMI OUT NRM PARAM NOF/U: HCPCS | Performed by: INTERNAL MEDICINE

## 2025-04-01 PROCEDURE — 3017F COLORECTAL CA SCREEN DOC REV: CPT | Performed by: INTERNAL MEDICINE

## 2025-04-01 PROCEDURE — G8399 PT W/DXA RESULTS DOCUMENT: HCPCS | Performed by: INTERNAL MEDICINE

## 2025-04-01 PROCEDURE — 1123F ACP DISCUSS/DSCN MKR DOCD: CPT | Performed by: INTERNAL MEDICINE

## 2025-04-01 ASSESSMENT — PATIENT HEALTH QUESTIONNAIRE - PHQ9
1. LITTLE INTEREST OR PLEASURE IN DOING THINGS: NOT AT ALL
SUM OF ALL RESPONSES TO PHQ QUESTIONS 1-9: 0
2. FEELING DOWN, DEPRESSED OR HOPELESS: NOT AT ALL
SUM OF ALL RESPONSES TO PHQ QUESTIONS 1-9: 0

## 2025-04-01 NOTE — PROGRESS NOTES
Terri Boswell (:  1955) is a 69 y.o. female, Established patient, here for evaluation of the following chief complaint(s):  Hypertension         Assessment & Plan  1. Hyperparathyroidism.  She had two parathyroid glands removed in 2024 due to hypercalcemia. Her calcium levels have since normalized.  Blood work will be conducted today to check calcium and ionized calcium levels.  If the results are normal, the scheduled appointment with Dr. Hernandez will be canceled.    2. Hypertension.  Her blood pressure readings are 136/75, 135/73, and 129/68, indicating white coat hypertension.  She is currently taking amlodipine 2.5 mg and valsartan 320 mg.  She is advised to continue her current medication regimen and maintain her exercise routine.    3. Hypercholesterolemia.  Her cholesterol levels have shown improvement, with a decrease from 118 in  to 108 in .  A heart scan was discussed as a potential option if her LDL levels show an upward trend.  She is advised to continue monitoring her diet and exercise.    4. Allergies.  She reports crackling in her ears and is currently taking Claritin without a decongestant.  She is advised to use Flonase nasal spray, administering 2 sprays in each nostril daily for a duration of 30 days.    5. Melanoma hx.  She has a history of melanoma and undergoes full body scans twice a year.  No new melanomas have been detected since 2016.    Results  Labs   - BUN creatinine ratio: High   - Sodium: Low   - Cholesterol levels: Decreased  1. Hyperparathyroidism  -     Comprehensive Metabolic Panel; Future  2. Rheumatoid arthritis, involving unspecified site, unspecified whether rheumatoid factor present (HCC)  -     Comprehensive Metabolic Panel; Future  3. History of high cholesterol  -     Lipid Panel; Future  4. Essential (primary) hypertension  -     CBC with Auto Differential; Future  -     Comprehensive Metabolic Panel; Future  5. High cholesterol  -     Lipid

## 2025-04-02 LAB
ALBUMIN SERPL-MCNC: 4.3 G/DL (ref 3.5–5)
ALBUMIN/GLOB SERPL: 1.2 (ref 1.1–2.2)
ALP SERPL-CCNC: 94 U/L (ref 45–117)
ALT SERPL-CCNC: 27 U/L (ref 12–78)
ANION GAP SERPL CALC-SCNC: 3 MMOL/L (ref 2–12)
AST SERPL-CCNC: 21 U/L (ref 15–37)
BASOPHILS # BLD: 0.04 K/UL (ref 0–0.1)
BASOPHILS NFR BLD: 0.8 % (ref 0–1)
BILIRUB SERPL-MCNC: 0.4 MG/DL (ref 0.2–1)
BUN SERPL-MCNC: 12 MG/DL (ref 6–20)
BUN/CREAT SERPL: 23 (ref 12–20)
CALCIUM SERPL-MCNC: 9.3 MG/DL (ref 8.5–10.1)
CHLORIDE SERPL-SCNC: 101 MMOL/L (ref 97–108)
CHOLEST SERPL-MCNC: 201 MG/DL
CO2 SERPL-SCNC: 30 MMOL/L (ref 21–32)
CREAT SERPL-MCNC: 0.53 MG/DL (ref 0.55–1.02)
DIFFERENTIAL METHOD BLD: ABNORMAL
EOSINOPHIL # BLD: 0.07 K/UL (ref 0–0.4)
EOSINOPHIL NFR BLD: 1.4 % (ref 0–7)
ERYTHROCYTE [DISTWIDTH] IN BLOOD BY AUTOMATED COUNT: 12.5 % (ref 11.5–14.5)
GLOBULIN SER CALC-MCNC: 3.7 G/DL (ref 2–4)
GLUCOSE SERPL-MCNC: 84 MG/DL (ref 65–100)
HCT VFR BLD AUTO: 42.3 % (ref 35–47)
HDLC SERPL-MCNC: 61 MG/DL
HDLC SERPL: 3.3 (ref 0–5)
HGB BLD-MCNC: 13.5 G/DL (ref 11.5–16)
IMM GRANULOCYTES # BLD AUTO: 0.01 K/UL (ref 0–0.04)
IMM GRANULOCYTES NFR BLD AUTO: 0.2 % (ref 0–0.5)
LDLC SERPL CALC-MCNC: 114.4 MG/DL (ref 0–100)
LYMPHOCYTES # BLD: 1.73 K/UL (ref 0.8–3.5)
LYMPHOCYTES NFR BLD: 34.3 % (ref 12–49)
MCH RBC QN AUTO: 29.9 PG (ref 26–34)
MCHC RBC AUTO-ENTMCNC: 31.9 G/DL (ref 30–36.5)
MCV RBC AUTO: 93.6 FL (ref 80–99)
MONOCYTES # BLD: 0.66 K/UL (ref 0–1)
MONOCYTES NFR BLD: 13.1 % (ref 5–13)
NEUTS SEG # BLD: 2.53 K/UL (ref 1.8–8)
NEUTS SEG NFR BLD: 50.2 % (ref 32–75)
NRBC # BLD: 0 K/UL (ref 0–0.01)
NRBC BLD-RTO: 0 PER 100 WBC
PLATELET # BLD AUTO: 199 K/UL (ref 150–400)
PMV BLD AUTO: 9.8 FL (ref 8.9–12.9)
POTASSIUM SERPL-SCNC: 4.9 MMOL/L (ref 3.5–5.1)
PROT SERPL-MCNC: 8 G/DL (ref 6.4–8.2)
RBC # BLD AUTO: 4.52 M/UL (ref 3.8–5.2)
SODIUM SERPL-SCNC: 134 MMOL/L (ref 136–145)
TRIGL SERPL-MCNC: 128 MG/DL
VLDLC SERPL CALC-MCNC: 25.6 MG/DL
WBC # BLD AUTO: 5 K/UL (ref 3.6–11)

## 2025-04-03 DIAGNOSIS — I10 ESSENTIAL (PRIMARY) HYPERTENSION: ICD-10-CM

## 2025-04-03 RX ORDER — AMLODIPINE BESYLATE 5 MG/1
5 TABLET ORAL DAILY
Qty: 90 TABLET | Refills: 0 | Status: SHIPPED | OUTPATIENT
Start: 2025-04-03

## 2025-04-03 RX ORDER — VALSARTAN 320 MG/1
320 TABLET ORAL DAILY
Qty: 90 TABLET | Refills: 0 | Status: SHIPPED | OUTPATIENT
Start: 2025-04-03

## 2025-04-04 ENCOUNTER — RESULTS FOLLOW-UP (OUTPATIENT)
Age: 70
End: 2025-04-04

## 2025-04-05 ENCOUNTER — TELEPHONE (OUTPATIENT)
Age: 70
End: 2025-04-05

## 2025-05-27 ENCOUNTER — OFFICE VISIT (OUTPATIENT)
Age: 70
End: 2025-05-27
Payer: MEDICARE

## 2025-05-27 ENCOUNTER — HOSPITAL ENCOUNTER (OUTPATIENT)
Facility: HOSPITAL | Age: 70
Discharge: HOME | End: 2025-05-30
Payer: MEDICARE

## 2025-05-27 ENCOUNTER — RESULTS FOLLOW-UP (OUTPATIENT)
Age: 70
End: 2025-05-27

## 2025-05-27 VITALS
SYSTOLIC BLOOD PRESSURE: 176 MMHG | HEIGHT: 68 IN | WEIGHT: 176.25 LBS | OXYGEN SATURATION: 100 % | DIASTOLIC BLOOD PRESSURE: 75 MMHG | TEMPERATURE: 97 F | HEART RATE: 68 BPM | BODY MASS INDEX: 26.71 KG/M2

## 2025-05-27 DIAGNOSIS — J06.9 UPPER RESPIRATORY TRACT INFECTION, UNSPECIFIED TYPE: ICD-10-CM

## 2025-05-27 DIAGNOSIS — J06.9 UPPER RESPIRATORY TRACT INFECTION, UNSPECIFIED TYPE: Primary | ICD-10-CM

## 2025-05-27 DIAGNOSIS — N30.00 ACUTE CYSTITIS WITHOUT HEMATURIA: ICD-10-CM

## 2025-05-27 PROCEDURE — 99213 OFFICE O/P EST LOW 20 MIN: CPT | Performed by: INTERNAL MEDICINE

## 2025-05-27 PROCEDURE — G8427 DOCREV CUR MEDS BY ELIG CLIN: HCPCS | Performed by: INTERNAL MEDICINE

## 2025-05-27 PROCEDURE — 1123F ACP DISCUSS/DSCN MKR DOCD: CPT | Performed by: INTERNAL MEDICINE

## 2025-05-27 PROCEDURE — 3017F COLORECTAL CA SCREEN DOC REV: CPT | Performed by: INTERNAL MEDICINE

## 2025-05-27 PROCEDURE — 3077F SYST BP >= 140 MM HG: CPT | Performed by: INTERNAL MEDICINE

## 2025-05-27 PROCEDURE — G8419 CALC BMI OUT NRM PARAM NOF/U: HCPCS | Performed by: INTERNAL MEDICINE

## 2025-05-27 PROCEDURE — G8399 PT W/DXA RESULTS DOCUMENT: HCPCS | Performed by: INTERNAL MEDICINE

## 2025-05-27 PROCEDURE — 71046 X-RAY EXAM CHEST 2 VIEWS: CPT

## 2025-05-27 PROCEDURE — 1090F PRES/ABSN URINE INCON ASSESS: CPT | Performed by: INTERNAL MEDICINE

## 2025-05-27 PROCEDURE — 1159F MED LIST DOCD IN RCRD: CPT | Performed by: INTERNAL MEDICINE

## 2025-05-27 PROCEDURE — 1036F TOBACCO NON-USER: CPT | Performed by: INTERNAL MEDICINE

## 2025-05-27 PROCEDURE — 3078F DIAST BP <80 MM HG: CPT | Performed by: INTERNAL MEDICINE

## 2025-05-27 PROCEDURE — 1160F RVW MEDS BY RX/DR IN RCRD: CPT | Performed by: INTERNAL MEDICINE

## 2025-05-27 RX ORDER — PREDNISONE 20 MG/1
TABLET ORAL
Qty: 12 TABLET | Refills: 0 | Status: SHIPPED | OUTPATIENT
Start: 2025-05-27

## 2025-05-27 RX ORDER — ALBUTEROL SULFATE 90 UG/1
2 INHALANT RESPIRATORY (INHALATION) 4 TIMES DAILY PRN
Qty: 54 G | Refills: 1 | Status: SHIPPED | OUTPATIENT
Start: 2025-05-27

## 2025-05-27 RX ORDER — AZITHROMYCIN 250 MG/1
TABLET, FILM COATED ORAL
Qty: 6 TABLET | Refills: 0 | Status: SHIPPED | OUTPATIENT
Start: 2025-05-27 | End: 2025-06-06

## 2025-05-27 ASSESSMENT — PATIENT HEALTH QUESTIONNAIRE - PHQ9
SUM OF ALL RESPONSES TO PHQ QUESTIONS 1-9: 0
1. LITTLE INTEREST OR PLEASURE IN DOING THINGS: NOT AT ALL
SUM OF ALL RESPONSES TO PHQ QUESTIONS 1-9: 0
2. FEELING DOWN, DEPRESSED OR HOPELESS: NOT AT ALL
SUM OF ALL RESPONSES TO PHQ QUESTIONS 1-9: 0
SUM OF ALL RESPONSES TO PHQ QUESTIONS 1-9: 0

## 2025-05-27 ASSESSMENT — ENCOUNTER SYMPTOMS
SHORTNESS OF BREATH: 0
WHEEZING: 1

## 2025-05-27 NOTE — PROGRESS NOTES
HISTORY OF PRESENT ILLNESS  Terri Boswell is a 69 y.o. female.  HPI    Pt reports hurting back last week on vacation,   since she reports her chest has been tight with coughing. Admits to cough symptoms in the morning.  Sinus congestion cough resolves during the day, but chest tightness is constant.  With wheezing Covid test negative. Denies fever and chills. Will order CXR today. Will rx zpac antibiotic. Prednisone directed to be taken for 6 days, and albuterol prn.    UTI has since improved. Will continue taking antibiotics    /76    Patient Active Problem List   Diagnosis    Abdominal pain    Rheumatoid arthritis (HCC)    History of melanoma    HTN (hypertension)    Elevated liver function tests    Adverse drug reaction    Long term current use of immunosuppressive drug    Transaminitis    Hypertension    History of abdominal pain    Former smoker, stopped smoking in distant past    Vitamin D deficiency    Infectious colitis    Gastritis    Acute lower GI bleeding    Hyperparathyroidism     Current Outpatient Medications   Medication Sig Dispense Refill    azithromycin (ZITHROMAX) 250 MG tablet 500mg on day 1 followed by 250mg on days 2 - 5 6 tablet 0    predniSONE (DELTASONE) 20 MG tablet 60mg po daily for 2days, 40mg po daily for 2 days, 20mg po daily for 2days then stop 12 tablet 0    albuterol sulfate HFA (VENTOLIN HFA) 108 (90 Base) MCG/ACT inhaler Inhale 2 puffs into the lungs 4 times daily as needed for Wheezing 54 g 1    valsartan (DIOVAN) 320 MG tablet Take 1 tablet by mouth once daily 90 tablet 0    amLODIPine (NORVASC) 5 MG tablet Take 1 tablet by mouth once daily 90 tablet 0    Etanercept 50 MG/ML SOAJ Inject 50 mg into the skin every 7 days      hydroxychloroquine (PLAQUENIL) 200 MG tablet Take 1 tablet by mouth 2 times daily      sulfaSALAzine (AZULFIDINE) 500 MG tablet Take 2 tablets by mouth 2 times daily      citrus calcium/vitamin D (CITRACAL+D) 200-6.25 MG-MCG TABS per tablet Take 2

## 2025-05-27 NOTE — PROGRESS NOTES
Have you been to the ER, urgent care clinic since your last visit?  Hospitalized since your last visit?   Urgent care    Have you seen or consulted any other health care providers outside our system since your last visit?   NO

## 2025-06-20 LAB — SARS-COV-2: NEGATIVE

## 2025-07-01 DIAGNOSIS — I10 ESSENTIAL (PRIMARY) HYPERTENSION: ICD-10-CM

## 2025-07-01 RX ORDER — VALSARTAN 320 MG/1
320 TABLET ORAL DAILY
Qty: 90 TABLET | Refills: 0 | Status: SHIPPED | OUTPATIENT
Start: 2025-07-01

## 2025-07-01 RX ORDER — AMLODIPINE BESYLATE 5 MG/1
5 TABLET ORAL DAILY
Qty: 90 TABLET | Refills: 0 | Status: SHIPPED | OUTPATIENT
Start: 2025-07-01

## (undated) DEVICE — SYR 10ML LUER LOK 1/5ML GRAD --

## (undated) DEVICE — CATH IV AUTOGRD BC PNK 20GA 25 -- INSYTE

## (undated) DEVICE — NEEDLE HYPO 18GA L1.5IN PNK S STL HUB POLYPR SHLD REG BVL

## (undated) DEVICE — SUTURE PERMAHAND SZ 3-0 L30IN NONABSORBABLE BLK SILK BRAID A304H

## (undated) DEVICE — AGENT HEMOSTATIC SURG ORIGINAL ABS 4X8IN LOOSE KNIT 12/CA

## (undated) DEVICE — KIT,1200CC CANISTER,3/16"X6' TUBING: Brand: MEDLINE INDUSTRIES, INC.

## (undated) DEVICE — Device

## (undated) DEVICE — BLADE ES L4IN INSUL EDGE

## (undated) DEVICE — SPONGE GZ W4XL4IN COT RADPQ HIGHLY ABSRB STERILE

## (undated) DEVICE — SHEET,T,THYROID,STERILE: Brand: MEDLINE

## (undated) DEVICE — APPLICATOR MEDICATED 10.5 CC SOLUTION HI LT ORNG CHLORAPREP

## (undated) DEVICE — NEONATAL-ADULT SPO2 SENSOR: Brand: NELLCOR

## (undated) DEVICE — SET ADMIN 16ML TBNG L100IN 2 Y INJ SITE IV PIGGY BK DISP

## (undated) DEVICE — SUTURE PERMA-HAND SZ 2-0 L30IN NONABSORBABLE BLK L26MM SH K833H

## (undated) DEVICE — SYRINGE IRRIG 60ML SFT PLIABLE BLB EZ TO GRP 1 HND USE W/

## (undated) DEVICE — PAD,NON-ADHERENT,2X3,STERILE,LF,1/PK: Brand: MEDLINE

## (undated) DEVICE — SUTURE ABSORBABLE BRAIDED 3-0 SHB 18 IN UD VICRYL + VCPB864D

## (undated) DEVICE — 1200 GUARD II KIT W/5MM TUBE W/O VAC TUBE: Brand: GUARDIAN

## (undated) DEVICE — 4-PORT MANIFOLD: Brand: NEPTUNE 2

## (undated) DEVICE — MAJOR LAPAROTOMY-MRMC: Brand: MEDLINE INDUSTRIES, INC.

## (undated) DEVICE — ELECTRODE,RADIOTRANSLUCENT,FOAM,5PK: Brand: MEDLINE

## (undated) DEVICE — 3M™ IOBAN™ 2 ANTIMICROBIAL INCISE DRAPE 6650EZ: Brand: IOBAN™ 2

## (undated) DEVICE — Device: Brand: FABCO

## (undated) DEVICE — CONTAINER,SPECIMEN,OR STERILE,4OZ: Brand: MEDLINE

## (undated) DEVICE — SYR 3ML LL TIP 1/10ML GRAD --

## (undated) DEVICE — STRIP,CLOSURE,WOUND,MEDI-STRIP,1/2X4: Brand: MEDLINE

## (undated) DEVICE — Z DISCONTINUED PER MEDLINE LINE GAS SAMPLING O2/CO2 LNG AD 13 FT NSL W/ TBNG FILTERLINE

## (undated) DEVICE — SOLIDIFIER FLD 2OZ 1500CC N DISINF IN BTL DISP SAFESORB

## (undated) DEVICE — MAGNETIC INSTR DRAPE 20X16: Brand: MEDLINE INDUSTRIES, INC.

## (undated) DEVICE — BASIN EMSIS 16OZ GRAPHITE PLAS KID SHP MOLD GRAD FOR ORAL

## (undated) DEVICE — BLADE ES ELASTOMERIC COAT INSUL DURABLE BEND UPTO 90DEG

## (undated) DEVICE — ENDOTRACH TUBE 8229508 CONT EMG 8MM ROHS: Brand: NIM CONTACT®

## (undated) DEVICE — ELECTRODE 8227410 PAIRED 2 CH SET ROHS

## (undated) DEVICE — GLOVE BIOGEL PI ULTRA TOUCH M SZ 7.5

## (undated) DEVICE — GOWN,SIRUS,NONRNF,SETINSLV,XL,20/CS: Brand: MEDLINE

## (undated) DEVICE — TOWEL 4 PLY TISS 19X30 SUE WHT

## (undated) DEVICE — SUTURE MONOCRYL STRATAFIX SPRL + SZ 4-0 L12IN ABSRB UD PS-2 SXMP1B117

## (undated) DEVICE — SEALER LAP SM L18.8CM OPN JAW HAND/FOOT SWCH FORCETRIAD

## (undated) DEVICE — SPONGE,PEANUT,XRAY,ST,SM,3/8",5/CARD: Brand: MEDLINE INDUSTRIES, INC.